# Patient Record
Sex: MALE | Race: WHITE | NOT HISPANIC OR LATINO | Employment: OTHER | URBAN - METROPOLITAN AREA
[De-identification: names, ages, dates, MRNs, and addresses within clinical notes are randomized per-mention and may not be internally consistent; named-entity substitution may affect disease eponyms.]

---

## 2017-11-29 ENCOUNTER — OFFICE VISIT (OUTPATIENT)
Dept: FAMILY MEDICINE CLINIC | Facility: CLINIC | Age: 72
End: 2017-11-29

## 2017-11-29 ENCOUNTER — HOSPITAL ENCOUNTER (OUTPATIENT)
Dept: GENERAL RADIOLOGY | Facility: HOSPITAL | Age: 72
Discharge: HOME | End: 2017-11-29
Attending: FAMILY MEDICINE | Admitting: FAMILY MEDICINE

## 2017-11-29 VITALS
OXYGEN SATURATION: 99 % | BODY MASS INDEX: 26.99 KG/M2 | WEIGHT: 178.1 LBS | SYSTOLIC BLOOD PRESSURE: 144 MMHG | DIASTOLIC BLOOD PRESSURE: 90 MMHG | HEIGHT: 68 IN | TEMPERATURE: 97.6 F | HEART RATE: 57 BPM

## 2017-11-29 DIAGNOSIS — M54.31 SCIATICA OF RIGHT SIDE: Primary | ICD-10-CM

## 2017-11-29 DIAGNOSIS — R09.89 PHLEGM IN THROAT: ICD-10-CM

## 2017-11-29 DIAGNOSIS — R12 HEARTBURN: ICD-10-CM

## 2017-11-29 DIAGNOSIS — M54.31 SCIATICA OF RIGHT SIDE: ICD-10-CM

## 2017-11-29 PROCEDURE — 99203 OFFICE O/P NEW LOW 30 MIN: CPT | Performed by: FAMILY MEDICINE

## 2017-11-29 PROCEDURE — 72110 X-RAY EXAM L-2 SPINE 4/>VWS: CPT

## 2017-11-29 RX ORDER — LANSOPRAZOLE 15 MG/1
15 CAPSULE, DELAYED RELEASE ORAL DAILY
COMMUNITY

## 2017-11-29 NOTE — PROGRESS NOTES
"  Subjective   Jake Kan is a 72 y.o. male who is here for   Chief Complaint   Patient presents with   • New Patient   • Leg Pain     Right leg \"burns\" x 4-5 months    .     History of Present Illness   New patient here.  Lives in [facility].  \" I don't trust those [place] doctors.\"  A   Has a new girlfriend Kary, who is an old patient of mine, although 3 yrs ago, (lost to follow up).    Right posterior leg pain off and on for months  No injury  No LBP  No hip pain  Does not sound like claudication.    Takes prn prevacid for GERD  Has a raspy voice  Suggested take prevacid every night for 2-3 months to see if he has GERD laryngitis  And add on nighty Claritin for phlegm in throat.        The following portions of the patient's history were reviewed and updated as appropriate: allergies, current medications, past family history, past medical history, past social history, past surgical history and problem list.    Review of Systems    Objective   Physical Exam   Constitutional: He appears well-developed and well-nourished. No distress.   HENT:   Mouth/Throat: Oropharynx is clear and moist.   Cardiovascular: Normal rate.    Pulmonary/Chest: Effort normal.   Musculoskeletal:        Right hip: He exhibits normal range of motion, normal strength, no tenderness, no bony tenderness and no swelling.        Right knee: Normal.        Thoracic back: Normal.        Lumbar back: He exhibits normal range of motion, no tenderness, no bony tenderness, no swelling, no edema, no spasm and normal pulse.        Legs:  Skin: He is not diaphoretic.   Nursing note and vitals reviewed.      Assessment/Plan   Jake was seen today for new patient and leg pain.    Diagnoses and all orders for this visit:    Sciatica of right side  -     XR Spine Lumbar 4+ View; Future    Phlegm in throat    Heartburn      There are no Patient Instructions on file for this visit.    There are no discontinued medications.     Return if " symptoms worsen or fail to improve.    Dr. Sree Thomas  [place], [place]

## 2019-08-07 ENCOUNTER — OFFICE VISIT (OUTPATIENT)
Dept: FAMILY MEDICINE CLINIC | Facility: CLINIC | Age: 74
End: 2019-08-07

## 2019-08-07 VITALS
OXYGEN SATURATION: 98 % | HEART RATE: 59 BPM | DIASTOLIC BLOOD PRESSURE: 88 MMHG | BODY MASS INDEX: 26.43 KG/M2 | WEIGHT: 174.4 LBS | HEIGHT: 68 IN | SYSTOLIC BLOOD PRESSURE: 156 MMHG

## 2019-08-07 DIAGNOSIS — Z12.5 ENCOUNTER FOR SCREENING FOR MALIGNANT NEOPLASM OF PROSTATE: ICD-10-CM

## 2019-08-07 DIAGNOSIS — K42.9 UMBILICAL HERNIA WITHOUT OBSTRUCTION AND WITHOUT GANGRENE: ICD-10-CM

## 2019-08-07 DIAGNOSIS — R53.82 CHRONIC FATIGUE: Primary | ICD-10-CM

## 2019-08-07 PROCEDURE — 99214 OFFICE O/P EST MOD 30 MIN: CPT | Performed by: FAMILY MEDICINE

## 2019-08-07 NOTE — PROGRESS NOTES
Subjective   Jake Kan is a 74 y.o. male who is here for   Chief Complaint   Patient presents with   • Fatigue     2 months    .     History of Present Illness   Fatigue: Patient complains of fatigue. Symptoms began several months ago. Sentinal symptom the patient feels fatigue began with: exercise intolerance. Symptoms of his fatigue have been fatigue with paradoxical insomnia, general malaise and poor athletic performance. Patient describes the following psychologic symptoms: none.  Patient denies fever, significant change in weight, symptoms of arthritis, unusual rashes, GI blood loss and witnessed or suspected sleep apnea. Symptoms have gradually worsened. Severity has been symptoms bothersome, but easily able to carry out all usual work/school/family activities. Previous visits for this problem: none.       The following portions of the patient's history were reviewed and updated as appropriate: allergies, current medications, past family history, past medical history, past social history, past surgical history and problem list.    Review of Systems   Constitutional: Positive for activity change and fatigue. Negative for appetite change, chills, diaphoresis, fever and unexpected weight change.   HENT: Negative for ear pain, facial swelling, nosebleeds, sinus pressure and tinnitus.    Eyes: Negative for visual disturbance.   Respiratory: Negative for cough, chest tightness, shortness of breath and wheezing.    Cardiovascular: Negative for chest pain, palpitations and leg swelling.   Gastrointestinal: Positive for abdominal pain. Negative for blood in stool, constipation, diarrhea, nausea, rectal pain and vomiting.   Endocrine: Negative for polydipsia, polyphagia and polyuria.   Genitourinary: Negative for dysuria, flank pain and hematuria.   Musculoskeletal: Negative for neck stiffness.   Skin: Negative for rash.   Neurological: Negative for dizziness, facial asymmetry, light-headedness and headaches.        Objective   Physical Exam   Constitutional: He appears well-developed and well-nourished. No distress.   HENT:   Mouth/Throat: Oropharynx is clear and moist.   Eyes: Conjunctivae are normal.   Neck: Neck supple.   Cardiovascular: Normal rate and regular rhythm.   Pulmonary/Chest: Effort normal and breath sounds normal.   Abdominal: Soft. Bowel sounds are normal.       Musculoskeletal: Normal range of motion.   Neurological: He is alert.   Skin: No rash noted.   Psychiatric: He has a normal mood and affect.   Nursing note and vitals reviewed.      Assessment/Plan   Jake was seen today for fatigue.    Diagnoses and all orders for this visit:    Chronic fatigue  -     CBC & Differential  -     Comprehensive Metabolic Panel  -     TSH  -     PSA Screen    Encounter for screening for malignant neoplasm of prostate   -     PSA Screen    Umbilical hernia without obstruction and without gangrene  -     CT Abdomen Pelvis With Contrast; Future      There are no Patient Instructions on file for this visit.    There are no discontinued medications.     Return if symptoms worsen or fail to improve.    Dr. Sree Thomas  [place], [place]

## 2019-08-08 LAB
ALBUMIN SERPL-MCNC: 4.1 G/DL (ref 3.5–5.2)
ALBUMIN/GLOB SERPL: 1.4 G/DL
ALP SERPL-CCNC: 76 U/L (ref 39–117)
ALT SERPL-CCNC: 16 U/L (ref 1–41)
AST SERPL-CCNC: 15 U/L (ref 1–40)
BASOPHILS # BLD AUTO: 0.02 10*3/MM3 (ref 0–0.2)
BASOPHILS NFR BLD AUTO: 0.4 % (ref 0–1.5)
BILIRUB SERPL-MCNC: 0.5 MG/DL (ref 0.2–1.2)
BUN SERPL-MCNC: 18 MG/DL (ref 8–23)
BUN/CREAT SERPL: 14.9 (ref 7–25)
CALCIUM SERPL-MCNC: 9.1 MG/DL (ref 8.6–10.5)
CHLORIDE SERPL-SCNC: 105 MMOL/L (ref 98–107)
CO2 SERPL-SCNC: 27.1 MMOL/L (ref 22–29)
CREAT SERPL-MCNC: 1.21 MG/DL (ref 0.76–1.27)
EOSINOPHIL # BLD AUTO: 0.16 10*3/MM3 (ref 0–0.4)
EOSINOPHIL NFR BLD AUTO: 3.1 % (ref 0.3–6.2)
ERYTHROCYTE [DISTWIDTH] IN BLOOD BY AUTOMATED COUNT: 12.6 % (ref 12.3–15.4)
GLOBULIN SER CALC-MCNC: 2.9 GM/DL
GLUCOSE SERPL-MCNC: 82 MG/DL (ref 65–99)
HCT VFR BLD AUTO: 42.6 % (ref 37.5–51)
HGB BLD-MCNC: 13.8 G/DL (ref 13–17.7)
IMM GRANULOCYTES # BLD AUTO: 0.01 10*3/MM3 (ref 0–0.05)
IMM GRANULOCYTES NFR BLD AUTO: 0.2 % (ref 0–0.5)
LYMPHOCYTES # BLD AUTO: 1.49 10*3/MM3 (ref 0.7–3.1)
LYMPHOCYTES NFR BLD AUTO: 28.6 % (ref 19.6–45.3)
MCH RBC QN AUTO: 31.5 PG (ref 26.6–33)
MCHC RBC AUTO-ENTMCNC: 32.4 G/DL (ref 31.5–35.7)
MCV RBC AUTO: 97.3 FL (ref 79–97)
MONOCYTES # BLD AUTO: 0.45 10*3/MM3 (ref 0.1–0.9)
MONOCYTES NFR BLD AUTO: 8.6 % (ref 5–12)
NEUTROPHILS # BLD AUTO: 3.08 10*3/MM3 (ref 1.7–7)
NEUTROPHILS NFR BLD AUTO: 59.1 % (ref 42.7–76)
NRBC BLD AUTO-RTO: 0 /100 WBC (ref 0–0.2)
PLATELET # BLD AUTO: 225 10*3/MM3 (ref 140–450)
POTASSIUM SERPL-SCNC: 3.9 MMOL/L (ref 3.5–5.2)
PROT SERPL-MCNC: 7 G/DL (ref 6–8.5)
PSA SERPL-MCNC: 1.02 NG/ML (ref 0–4)
RBC # BLD AUTO: 4.38 10*6/MM3 (ref 4.14–5.8)
SODIUM SERPL-SCNC: 144 MMOL/L (ref 136–145)
TSH SERPL DL<=0.005 MIU/L-ACNC: 2.07 MIU/ML (ref 0.27–4.2)
WBC # BLD AUTO: 5.21 10*3/MM3 (ref 3.4–10.8)

## 2019-09-03 ENCOUNTER — APPOINTMENT (OUTPATIENT)
Dept: CT IMAGING | Facility: HOSPITAL | Age: 74
End: 2019-09-03

## 2019-09-11 ENCOUNTER — HOSPITAL ENCOUNTER (OUTPATIENT)
Dept: CT IMAGING | Facility: HOSPITAL | Age: 74
Discharge: HOME | End: 2019-09-11
Admitting: FAMILY MEDICINE

## 2019-09-11 DIAGNOSIS — K42.9 UMBILICAL HERNIA WITHOUT OBSTRUCTION AND WITHOUT GANGRENE: ICD-10-CM

## 2019-09-11 PROCEDURE — 74177 CT ABD & PELVIS W/CONTRAST: CPT

## 2019-09-11 PROCEDURE — 0 IOPAMIDOL PER 1 ML: Performed by: FAMILY MEDICINE

## 2019-09-11 RX ADMIN — IOPAMIDOL 100 ML: 755 INJECTION, SOLUTION INTRAVENOUS at 11:37

## 2019-09-12 DIAGNOSIS — L02.216 ABSCESS, UMBILICAL: Primary | ICD-10-CM

## 2019-09-19 ENCOUNTER — APPOINTMENT (OUTPATIENT)
Dept: PREADMISSION TESTING | Facility: HOSPITAL | Age: 74
End: 2019-09-19

## 2019-09-19 ENCOUNTER — OFFICE VISIT (OUTPATIENT)
Dept: SURGERY | Facility: CLINIC | Age: 74
End: 2019-09-19

## 2019-09-19 VITALS
WEIGHT: 172.6 LBS | HEART RATE: 61 BPM | SYSTOLIC BLOOD PRESSURE: 132 MMHG | BODY MASS INDEX: 27.09 KG/M2 | OXYGEN SATURATION: 98 % | DIASTOLIC BLOOD PRESSURE: 75 MMHG | HEIGHT: 67 IN

## 2019-09-19 VITALS
BODY MASS INDEX: 27.06 KG/M2 | SYSTOLIC BLOOD PRESSURE: 129 MMHG | HEIGHT: 67 IN | WEIGHT: 172.4 LBS | TEMPERATURE: 97.2 F | OXYGEN SATURATION: 98 % | HEART RATE: 56 BPM | DIASTOLIC BLOOD PRESSURE: 73 MMHG

## 2019-09-19 DIAGNOSIS — L02.216 ABSCESS, UMBILICAL: Primary | ICD-10-CM

## 2019-09-19 LAB
ANION GAP SERPL CALCULATED.3IONS-SCNC: 11.2 MMOL/L (ref 5–15)
BUN BLD-MCNC: 17 MG/DL (ref 8–20)
BUN/CREAT SERPL: 14.2 (ref 6.2–20.3)
CALCIUM SPEC-SCNC: 8.5 MG/DL (ref 8.9–10.3)
CHLORIDE SERPL-SCNC: 104 MMOL/L (ref 101–111)
CO2 SERPL-SCNC: 27 MMOL/L (ref 22–32)
CREAT BLD-MCNC: 1.2 MG/DL (ref 0.7–1.2)
DEPRECATED RDW RBC AUTO: 43.3 FL (ref 37–54)
ERYTHROCYTE [DISTWIDTH] IN BLOOD BY AUTOMATED COUNT: 13.1 % (ref 12.3–15.4)
GFR SERPL CREATININE-BSD FRML MDRD: 59 ML/MIN/1.73
GLUCOSE BLD-MCNC: 84 MG/DL (ref 65–99)
HCT VFR BLD AUTO: 40.1 % (ref 37.5–51)
HGB BLD-MCNC: 13.7 G/DL (ref 13–17.7)
MCH RBC QN AUTO: 32.4 PG (ref 26.6–33)
MCHC RBC AUTO-ENTMCNC: 34.1 G/DL (ref 31.5–35.7)
MCV RBC AUTO: 95 FL (ref 79–97)
PLATELET # BLD AUTO: 242 10*3/MM3 (ref 140–450)
PMV BLD AUTO: 8.1 FL (ref 6–12)
POTASSIUM BLD-SCNC: 4.2 MMOL/L (ref 3.6–5.1)
RBC # BLD AUTO: 4.22 10*6/MM3 (ref 4.14–5.8)
SODIUM BLD-SCNC: 138 MMOL/L (ref 136–144)
WBC NRBC COR # BLD: 6 10*3/MM3 (ref 3.4–10.8)

## 2019-09-19 PROCEDURE — 80048 BASIC METABOLIC PNL TOTAL CA: CPT | Performed by: SURGERY

## 2019-09-19 PROCEDURE — 85027 COMPLETE CBC AUTOMATED: CPT | Performed by: SURGERY

## 2019-09-19 PROCEDURE — 99203 OFFICE O/P NEW LOW 30 MIN: CPT | Performed by: SURGERY

## 2019-09-19 PROCEDURE — 93005 ELECTROCARDIOGRAM TRACING: CPT

## 2019-09-19 PROCEDURE — 93010 ELECTROCARDIOGRAM REPORT: CPT | Performed by: INTERNAL MEDICINE

## 2019-09-19 PROCEDURE — 36415 COLL VENOUS BLD VENIPUNCTURE: CPT

## 2019-09-19 RX ORDER — SODIUM CHLORIDE 9 MG/ML
100 INJECTION, SOLUTION INTRAVENOUS CONTINUOUS
Status: CANCELLED | OUTPATIENT
Start: 2019-09-19

## 2019-09-19 NOTE — H&P (VIEW-ONLY)
"Subjective   Jake Kan is a 74 y.o. male.   Chief Complaint   Patient presents with   • Abscess     umbilical     /73   Pulse 56   Temp 97.2 °F (36.2 °C) (Oral)   Ht 170.2 cm (67\")   Wt 78.2 kg (172 lb 6.4 oz)   SpO2 98%   BMI 27.00 kg/m²     HISTORY OF PRESENT ILLNESS:  74-year-old man has been referred to me from evaluation of a chronic draining umbilicus.  He says that over the last 2 months he has had near daily bloody drainage from his bellybutton.  He denies any significant pain or swelling but he does say that there has been some surrounding skin sensitivity.  As part of his work-up he had a CT scan which showed a 2 x 1.5 x 1.5 cm extraperitoneal fluid collection deep to the umbilicus.  This was read as an umbilical abscess.  He denies any fevers or chills or ever having had surgery in this area before.      Outpatient Encounter Medications as of 9/19/2019   Medication Sig Dispense Refill   • lansoprazole (PREVACID) 15 MG capsule Take 15 mg by mouth Daily.       No facility-administered encounter medications on file as of 9/19/2019.          The following portions of the patient's history were reviewed and updated as appropriate: allergies, current medications, past family history, past medical history, past social history, past surgical history and problem list.    Review of Systems  Complete review of systems is been obtained is negative except as stated in HPI  Objective   Physical Exam   Constitutional: He appears well-developed and well-nourished.   HENT:   Head: Atraumatic.   Poor dentition   Eyes: Conjunctivae are normal. No scleral icterus.   Cardiovascular: Normal rate.   Pulmonary/Chest: Effort normal.   Abdominal: Soft. He exhibits no distension. There is no tenderness.   In the umbilicus there is clearly some foreign material could be hair and dead skin cells that despite aggressive pulling I was not able to free.  He is mildly tender in this location and there is some minor " bleeding during the process of trying to evacuate this foreign material   Musculoskeletal: He exhibits no edema or deformity.   Neurological: He is alert. No cranial nerve deficit.   Skin: Skin is warm and dry.   Psychiatric: He has a normal mood and affect. His behavior is normal.         Assessment/Plan   Jake was seen today for abscess.    Diagnoses and all orders for this visit:    Abscess, umbilical  -     Case Request; Standing  -     sodium chloride 0.9 % infusion  -     ceFAZolin (ANCEF) 2 g in sodium chloride 0.9 % 100 mL IVPB  -     Case Request    Other orders  -     Follow Anesthesia Guidelines / Standing Orders; Future  -     Provide NPO Instructions to Patient; Future  -     Follow Anesthesia Guidelines / Standing Orders; Standing  -     Verify NPO Status; Standing  -     Obtain Informed Consent; Standing  -     SCD (Sequential Compression Device) - Place on Patient in Pre-Op; Standing  -     Clip Operative Site; Standing  -     Have Patient Void Prior to Procedure; Standing  -     Verify/Perform Chlorhexidine Skin Prep; Standing  -     Instructions on Coughing, Deep Breathing & Incentive Spirometry; Standing  -     Oxygen Therapy- Nasal Cannula; Titrate for SPO2: 90% - 95%; Standing  -     Notify Physician - Standard; Standing    Since he does have some impacted foreign material in the base of his umbilicus with some minor bleeding associated with an approximately 2 cm fluid collection just deep to the umbilicus I have recommended umbilical exploration drainage of the abscess and removal of the foreign body.  I have counseled him about the risks of this operation including developing a hernia if I have to incised the fascia skin infection recurrence and most importantly that is part of this operation I may have to excise the umbilical stalk completely.  After this discussion he did not seem to be too concerned about that is reluctant to have surgery but understands that living with a bleeding  colton is quite a nuisance.  We will go ahead and schedule him for his excisional debridement and drainage of the underlying abscess if that is with this turns out to be.    Luan Dowell MD  9/19/2019  3:30 PM

## 2019-09-20 ENCOUNTER — ANESTHESIA EVENT (OUTPATIENT)
Dept: PERIOP | Facility: HOSPITAL | Age: 74
End: 2019-09-20

## 2019-09-20 ENCOUNTER — APPOINTMENT (OUTPATIENT)
Dept: PREADMISSION TESTING | Facility: HOSPITAL | Age: 74
End: 2019-09-20

## 2019-09-23 ENCOUNTER — ANESTHESIA (OUTPATIENT)
Dept: PERIOP | Facility: HOSPITAL | Age: 74
End: 2019-09-23

## 2019-09-23 ENCOUNTER — HOSPITAL ENCOUNTER (OUTPATIENT)
Facility: HOSPITAL | Age: 74
Setting detail: HOSPITAL OUTPATIENT SURGERY
Discharge: HOME | End: 2019-09-23
Attending: SURGERY | Admitting: SURGERY

## 2019-09-23 VITALS
HEART RATE: 55 BPM | RESPIRATION RATE: 14 BRPM | SYSTOLIC BLOOD PRESSURE: 122 MMHG | DIASTOLIC BLOOD PRESSURE: 51 MMHG | OXYGEN SATURATION: 97 % | TEMPERATURE: 97.1 F

## 2019-09-23 DIAGNOSIS — L02.216 ABSCESS, UMBILICAL: ICD-10-CM

## 2019-09-23 PROCEDURE — 49585 PR REPAIR UMBILICAL HERN,5+Y/O,REDUC: CPT | Performed by: NURSE PRACTITIONER

## 2019-09-23 PROCEDURE — 25010000002 HYDRALAZINE PER 20 MG: Performed by: ANESTHESIOLOGIST ASSISTANT

## 2019-09-23 PROCEDURE — 25010000002 HYDROMORPHONE PER 4 MG: Performed by: ANESTHESIOLOGIST ASSISTANT

## 2019-09-23 PROCEDURE — 25010000002 ONDANSETRON PER 1 MG: Performed by: ANESTHESIOLOGIST ASSISTANT

## 2019-09-23 PROCEDURE — 88304 TISSUE EXAM BY PATHOLOGIST: CPT | Performed by: SURGERY

## 2019-09-23 PROCEDURE — 25010000002 DEXAMETHASONE PER 1 MG: Performed by: ANESTHESIOLOGIST ASSISTANT

## 2019-09-23 PROCEDURE — 49585 PR REPAIR UMBILICAL HERN,5+Y/O,REDUC: CPT | Performed by: SURGERY

## 2019-09-23 PROCEDURE — 25010000002 FENTANYL CITRATE (PF) 100 MCG/2ML SOLUTION: Performed by: ANESTHESIOLOGIST ASSISTANT

## 2019-09-23 PROCEDURE — 25010000002 PROPOFOL 10 MG/ML EMULSION: Performed by: ANESTHESIOLOGIST ASSISTANT

## 2019-09-23 RX ORDER — SODIUM CHLORIDE 9 MG/ML
9 INJECTION, SOLUTION INTRAVENOUS CONTINUOUS PRN
Status: DISCONTINUED | OUTPATIENT
Start: 2019-09-23 | End: 2019-09-23 | Stop reason: HOSPADM

## 2019-09-23 RX ORDER — LIDOCAINE HYDROCHLORIDE 20 MG/ML
INJECTION, SOLUTION EPIDURAL; INFILTRATION; INTRACAUDAL; PERINEURAL AS NEEDED
Status: DISCONTINUED | OUTPATIENT
Start: 2019-09-23 | End: 2019-09-23 | Stop reason: SURG

## 2019-09-23 RX ORDER — MEPERIDINE HYDROCHLORIDE 25 MG/ML
12.5 INJECTION INTRAMUSCULAR; INTRAVENOUS; SUBCUTANEOUS
Status: DISCONTINUED | OUTPATIENT
Start: 2019-09-23 | End: 2019-09-23 | Stop reason: HOSPADM

## 2019-09-23 RX ORDER — SODIUM CHLORIDE 0.9 % (FLUSH) 0.9 %
3 SYRINGE (ML) INJECTION EVERY 12 HOURS SCHEDULED
Status: DISCONTINUED | OUTPATIENT
Start: 2019-09-23 | End: 2019-09-23 | Stop reason: HOSPADM

## 2019-09-23 RX ORDER — NEOSTIGMINE METHYLSULFATE 5 MG/5 ML
SYRINGE (ML) INTRAVENOUS AS NEEDED
Status: DISCONTINUED | OUTPATIENT
Start: 2019-09-23 | End: 2019-09-23 | Stop reason: SURG

## 2019-09-23 RX ORDER — SODIUM CHLORIDE 9 MG/ML
100 INJECTION, SOLUTION INTRAVENOUS CONTINUOUS
Status: DISCONTINUED | OUTPATIENT
Start: 2019-09-23 | End: 2019-09-23 | Stop reason: HOSPADM

## 2019-09-23 RX ORDER — IPRATROPIUM BROMIDE AND ALBUTEROL SULFATE 2.5; .5 MG/3ML; MG/3ML
3 SOLUTION RESPIRATORY (INHALATION) ONCE AS NEEDED
Status: DISCONTINUED | OUTPATIENT
Start: 2019-09-23 | End: 2019-09-23 | Stop reason: HOSPADM

## 2019-09-23 RX ORDER — GLYCOPYRROLATE 1 MG/5 ML
SYRINGE (ML) INTRAVENOUS AS NEEDED
Status: DISCONTINUED | OUTPATIENT
Start: 2019-09-23 | End: 2019-09-23 | Stop reason: SURG

## 2019-09-23 RX ORDER — DIPHENHYDRAMINE HYDROCHLORIDE 50 MG/ML
12.5 INJECTION INTRAMUSCULAR; INTRAVENOUS
Status: DISCONTINUED | OUTPATIENT
Start: 2019-09-23 | End: 2019-09-23 | Stop reason: HOSPADM

## 2019-09-23 RX ORDER — ROCURONIUM BROMIDE 10 MG/ML
INJECTION, SOLUTION INTRAVENOUS AS NEEDED
Status: DISCONTINUED | OUTPATIENT
Start: 2019-09-23 | End: 2019-09-23 | Stop reason: SURG

## 2019-09-23 RX ORDER — EPHEDRINE SULFATE 50 MG/ML
5 INJECTION, SOLUTION INTRAVENOUS ONCE AS NEEDED
Status: DISCONTINUED | OUTPATIENT
Start: 2019-09-23 | End: 2019-09-23 | Stop reason: HOSPADM

## 2019-09-23 RX ORDER — PROMETHAZINE HYDROCHLORIDE 25 MG/1
25 TABLET ORAL ONCE AS NEEDED
Status: DISCONTINUED | OUTPATIENT
Start: 2019-09-23 | End: 2019-09-23 | Stop reason: HOSPADM

## 2019-09-23 RX ORDER — PROMETHAZINE HYDROCHLORIDE 25 MG/ML
6.25 INJECTION, SOLUTION INTRAMUSCULAR; INTRAVENOUS ONCE AS NEEDED
Status: DISCONTINUED | OUTPATIENT
Start: 2019-09-23 | End: 2019-09-23 | Stop reason: HOSPADM

## 2019-09-23 RX ORDER — HYDROCODONE BITARTRATE AND ACETAMINOPHEN 5; 325 MG/1; MG/1
1 TABLET ORAL EVERY 4 HOURS PRN
Qty: 15 TABLET | Refills: 0 | Status: SHIPPED | OUTPATIENT
Start: 2019-09-23 | End: 2019-10-08

## 2019-09-23 RX ORDER — FENTANYL CITRATE 50 UG/ML
INJECTION, SOLUTION INTRAMUSCULAR; INTRAVENOUS AS NEEDED
Status: DISCONTINUED | OUTPATIENT
Start: 2019-09-23 | End: 2019-09-23 | Stop reason: SURG

## 2019-09-23 RX ORDER — LABETALOL HYDROCHLORIDE 5 MG/ML
5 INJECTION, SOLUTION INTRAVENOUS
Status: DISCONTINUED | OUTPATIENT
Start: 2019-09-23 | End: 2019-09-23 | Stop reason: HOSPADM

## 2019-09-23 RX ORDER — HYDRALAZINE HYDROCHLORIDE 20 MG/ML
5 INJECTION INTRAMUSCULAR; INTRAVENOUS
Status: DISCONTINUED | OUTPATIENT
Start: 2019-09-23 | End: 2019-09-23 | Stop reason: HOSPADM

## 2019-09-23 RX ORDER — SODIUM CHLORIDE 0.9 % (FLUSH) 0.9 %
3-10 SYRINGE (ML) INJECTION AS NEEDED
Status: DISCONTINUED | OUTPATIENT
Start: 2019-09-23 | End: 2019-09-23 | Stop reason: HOSPADM

## 2019-09-23 RX ORDER — DEXAMETHASONE SODIUM PHOSPHATE 4 MG/ML
INJECTION, SOLUTION INTRA-ARTICULAR; INTRALESIONAL; INTRAMUSCULAR; INTRAVENOUS; SOFT TISSUE AS NEEDED
Status: DISCONTINUED | OUTPATIENT
Start: 2019-09-23 | End: 2019-09-23 | Stop reason: SURG

## 2019-09-23 RX ORDER — HYDROMORPHONE HCL 110MG/55ML
0.5 PATIENT CONTROLLED ANALGESIA SYRINGE INTRAVENOUS
Status: DISCONTINUED | OUTPATIENT
Start: 2019-09-23 | End: 2019-09-23 | Stop reason: HOSPADM

## 2019-09-23 RX ORDER — PROMETHAZINE HYDROCHLORIDE 25 MG/1
25 SUPPOSITORY RECTAL ONCE AS NEEDED
Status: DISCONTINUED | OUTPATIENT
Start: 2019-09-23 | End: 2019-09-23 | Stop reason: HOSPADM

## 2019-09-23 RX ORDER — ONDANSETRON 2 MG/ML
INJECTION INTRAMUSCULAR; INTRAVENOUS AS NEEDED
Status: DISCONTINUED | OUTPATIENT
Start: 2019-09-23 | End: 2019-09-23 | Stop reason: SURG

## 2019-09-23 RX ORDER — SODIUM CHLORIDE, SODIUM LACTATE, POTASSIUM CHLORIDE, CALCIUM CHLORIDE 600; 310; 30; 20 MG/100ML; MG/100ML; MG/100ML; MG/100ML
9 INJECTION, SOLUTION INTRAVENOUS CONTINUOUS PRN
Status: DISCONTINUED | OUTPATIENT
Start: 2019-09-23 | End: 2019-09-23 | Stop reason: HOSPADM

## 2019-09-23 RX ORDER — ONDANSETRON 2 MG/ML
4 INJECTION INTRAMUSCULAR; INTRAVENOUS ONCE AS NEEDED
Status: DISCONTINUED | OUTPATIENT
Start: 2019-09-23 | End: 2019-09-23 | Stop reason: HOSPADM

## 2019-09-23 RX ORDER — PROPOFOL 10 MG/ML
VIAL (ML) INTRAVENOUS AS NEEDED
Status: DISCONTINUED | OUTPATIENT
Start: 2019-09-23 | End: 2019-09-23 | Stop reason: SURG

## 2019-09-23 RX ORDER — EPHEDRINE SULFATE/0.9% NACL/PF 25 MG/5 ML
SYRINGE (ML) INTRAVENOUS AS NEEDED
Status: DISCONTINUED | OUTPATIENT
Start: 2019-09-23 | End: 2019-09-23 | Stop reason: SURG

## 2019-09-23 RX ADMIN — SODIUM CHLORIDE, SODIUM LACTATE, POTASSIUM CHLORIDE, AND CALCIUM CHLORIDE 9 ML/HR: 600; 310; 30; 20 INJECTION, SOLUTION INTRAVENOUS at 11:38

## 2019-09-23 RX ADMIN — HYDROMORPHONE HYDROCHLORIDE 0.5 MG: 2 INJECTION, SOLUTION INTRAMUSCULAR; INTRAVENOUS; SUBCUTANEOUS at 13:02

## 2019-09-23 RX ADMIN — PROPOFOL 200 MG: 10 INJECTION, EMULSION INTRAVENOUS at 11:55

## 2019-09-23 RX ADMIN — ROCURONIUM BROMIDE 40 MG: 10 INJECTION, SOLUTION INTRAVENOUS at 11:57

## 2019-09-23 RX ADMIN — Medication 1 MG: at 12:33

## 2019-09-23 RX ADMIN — Medication 5 MG: at 12:33

## 2019-09-23 RX ADMIN — CEFAZOLIN SODIUM 2 G: 1 INJECTION, POWDER, FOR SOLUTION INTRAMUSCULAR; INTRAVENOUS at 12:03

## 2019-09-23 RX ADMIN — Medication 10 MG: at 12:05

## 2019-09-23 RX ADMIN — MEPERIDINE HYDROCHLORIDE 12.5 MG: 25 INJECTION INTRAMUSCULAR; INTRAVENOUS; SUBCUTANEOUS at 13:52

## 2019-09-23 RX ADMIN — ONDANSETRON 4 MG: 2 INJECTION INTRAMUSCULAR; INTRAVENOUS at 12:02

## 2019-09-23 RX ADMIN — FENTANYL CITRATE 50 MCG: 50 INJECTION, SOLUTION INTRAMUSCULAR; INTRAVENOUS at 11:55

## 2019-09-23 RX ADMIN — FENTANYL CITRATE 50 MCG: 50 INJECTION, SOLUTION INTRAMUSCULAR; INTRAVENOUS at 12:16

## 2019-09-23 RX ADMIN — HYDROMORPHONE HYDROCHLORIDE 0.5 MG: 2 INJECTION, SOLUTION INTRAMUSCULAR; INTRAVENOUS; SUBCUTANEOUS at 13:28

## 2019-09-23 RX ADMIN — DEXAMETHASONE SODIUM PHOSPHATE 4 MG: 4 INJECTION, SOLUTION INTRAMUSCULAR; INTRAVENOUS at 12:02

## 2019-09-23 RX ADMIN — HYDRALAZINE HYDROCHLORIDE 5 MG: 20 INJECTION INTRAMUSCULAR; INTRAVENOUS at 13:17

## 2019-09-23 RX ADMIN — LIDOCAINE HYDROCHLORIDE 50 MG: 20 INJECTION, SOLUTION EPIDURAL; INFILTRATION; INTRACAUDAL; PERINEURAL at 11:55

## 2019-09-23 NOTE — ANESTHESIA PROCEDURE NOTES
Airway  Urgency: elective    Date/Time: 9/23/2019 11:58 AM  Difficult airway    General Information and Staff    Patient location during procedure: OR    Indications and Patient Condition  Indications for airway management: airway protection    Preoxygenated: yes  MILS maintained throughout  Mask difficulty assessment: 1 - vent by mask    Final Airway Details  Final airway type: endotracheal airway      Successful airway: ETT  Cuffed: yes   Successful intubation technique: direct laryngoscopy  Facilitating devices/methods: Bougie  Endotracheal tube insertion site: oral  Blade: Sil  Blade size: 4  ETT size (mm): 7.5  Cormack-Lehane Classification: grade III - view of epiglottis only  Placement verified by: chest auscultation and capnometry   Cuff volume (mL): 8  Measured from: lips  ETT/EBT  to lips (cm): 20  Number of attempts at approach: 2  Assessment: lips, teeth, and gum same as pre-op and atraumatic intubation

## 2019-09-23 NOTE — ANESTHESIA PREPROCEDURE EVALUATION
Anesthesia Evaluation     Patient summary reviewed   NPO Solid Status: > 8 hours  NPO Liquid Status: > 8 hours           Airway   Mallampati: II  TM distance: >3 FB  Neck ROM: full  No difficulty expected  Dental - normal exam     Pulmonary - normal exam   Cardiovascular - normal exam  Exercise tolerance: good (4-7 METS)    ECG reviewed    (+) hyperlipidemia,       Neuro/Psych  GI/Hepatic/Renal/Endo      Musculoskeletal     Abdominal  - normal exam    Bowel sounds: normal.   Substance History      OB/GYN          Other                        Anesthesia Plan    ASA 2     general     intravenous induction   Anesthetic plan, all risks, benefits, and alternatives have been provided, discussed and informed consent has been obtained with: patient.

## 2019-09-23 NOTE — ANESTHESIA POSTPROCEDURE EVALUATION
Patient: Jake Kan    Procedure Summary     Date:  09/23/19 Room / Location:  [facility] OR  / [facility] MAIN OR    Anesthesia Start:  1150 Anesthesia Stop:  1253    Procedure:  excision of umbilical hernia mesh and umbilical hernia repair (N/A Abdomen) Diagnosis:       Abscess, umbilical      (Abscess, umbilical [L02.216])    Surgeon:  Luan Dowell MD Provider:  Anjel Tesfaye MD    Anesthesia Type:  general ASA Status:  2          Anesthesia Type: general  Last vitals  BP   162/83 (09/23/19 1410)   Temp   97.3 °F (36.3 °C) (09/23/19 1410)   Pulse   56 (09/23/19 1410)   Resp   15 (09/23/19 1410)     SpO2   96 % (09/23/19 1410)     Post Anesthesia Care and Evaluation    Patient location during evaluation: PACU  Patient participation: complete - patient participated  Level of consciousness: awake  Pain scale: See nurse's notes for pain score.  Pain management: adequate  Airway patency: patent  Anesthetic complications: No anesthetic complications  PONV Status: none  Cardiovascular status: acceptable  Respiratory status: acceptable  Hydration status: acceptable    Comments: Patient seen and examined postoperatively; vital signs stable; SpO2 greater than or equal to 90%; cardiopulmonary status stable; nausea/vomiting adequately controlled; pain adequately controlled; no apparent anesthesia complications; patient discharged from anesthesia care when discharge criteria were met

## 2019-09-23 NOTE — OP NOTE
Operative Report:    Patient Name:  Jake Kan  YOB: 1945    Date of Surgery:  9/23/2019     Indications: 74-year-old man with a history of a bleeding draining sinus tract in his umbilicus.  CT scan demonstrated an approximately 2 cm fluid collection deep to the umbilical stalk concerning for an umbilical abscess.  On my exam in the office he had bleeding from the base of the umbilical stalk and dense fibrous material suggesting previously implanted umbilical hernia mesh.  I counseled about the risks and benefits of surgical intervention, he understood and was willing to proceed.    Pre-op Diagnosis:   Abscess, umbilical [L02.216]       Post-Op Diagnosis Codes:  Infected hernia mesh    Procedure/CPT® Codes:      Procedure(s):  Excision of infected hernia mesh  Primary umbilical hernia repair    Staff:  Surgeon(s):  Luan Dowell MD    Assistant: Damaris Desir APRN    Anesthesia: General    Estimated Blood Loss: minimal    Implants:    Nothing was implanted during the procedure    Specimen:          Specimens     ID Source Type Tests Collected By Collected At Frozen?      A Umbilicus Tissue · TISSUE PATHOLOGY EXAM   Luan Dowell MD 9/23/19 1227 No     Description: excision of umbilical mass              Findings: Chronically infected hernia mesh with erosion into umbilical stalk causing bleeding    Complications: None    Description of Procedure: Patient was taken to the operating room placed on the operating table in the supine position under general anesthesia.  His abdomen was prepped and draped normal sterile fashion.  Timeout was performed identifying the correct patient procedure site of operation.    I began the operation by making an elliptical incision in the transverse orientation around the umbilical stalk where the chronic bleeding and infection was present.  Dissection through subtenons tissues was performed with the Bovie down to the fascia.  This was performed circumferentially  and the fascia was then incised.  A dense fibrous two centimeter wide of material was well encapsulated I removed.  This was sent to pathology for view.  I then inspected the underlying contents of the abdomen which were the omentum and a loop of small bowel which were not injured during the removal process.  The underlying abdominal wall fascia were palpated without any evidence of mass or retained material.  I then closed the hernia defect using interrupted 0 Ethibond sutures in a transverse orientation.  The wound was then thoroughly irrigated with saline.  The deep subtenons tissues were closed with 3-0 Vicryl suture the dermis with 3-0 Vicryl suture and the skin with 4-0 Vicryl suture.  Mastisol and Steri-Strips were placed on the wound.      Luan Dowell MD     Date: 9/23/2019  Time: 12:44 PM

## 2019-09-25 LAB
LAB AP CASE REPORT: NORMAL
PATH REPORT.FINAL DX SPEC: NORMAL
PATH REPORT.GROSS SPEC: NORMAL

## 2019-10-08 ENCOUNTER — OFFICE VISIT (OUTPATIENT)
Dept: SURGERY | Facility: CLINIC | Age: 74
End: 2019-10-08

## 2019-10-08 VITALS
HEART RATE: 60 BPM | SYSTOLIC BLOOD PRESSURE: 131 MMHG | OXYGEN SATURATION: 100 % | HEIGHT: 67 IN | TEMPERATURE: 96.8 F | WEIGHT: 169.2 LBS | BODY MASS INDEX: 26.56 KG/M2 | DIASTOLIC BLOOD PRESSURE: 75 MMHG

## 2019-10-08 DIAGNOSIS — L02.216 ABSCESS, UMBILICAL: Primary | ICD-10-CM

## 2019-10-08 PROCEDURE — 99024 POSTOP FOLLOW-UP VISIT: CPT | Performed by: SURGERY

## 2019-10-08 NOTE — PROGRESS NOTES
"Subjective   Jake Kan is a 74 y.o. male.   74-year-old man who is 2 weeks out from a excision of a infected hernia mesh at the umbilicus.  He says he has had minimal pain since his operation been tolerating a diet having bowel function and is getting back to his activities of daily living.    Objective   /75   Pulse 60   Temp 96.8 °F (36 °C) (Oral)   Ht 170.2 cm (67.01\")   Wt 76.7 kg (169 lb 3.2 oz)   SpO2 100%   BMI 26.49 kg/m²   Physical Exam   Constitutional: He appears well-developed and well-nourished.   HENT:   Head: Normocephalic and atraumatic.   Pulmonary/Chest: Effort normal. No respiratory distress.   Abdominal: Soft.   His umbilical incision has healed well with some induration but no evidence of drainage or infection.  There is no evidence of recurrent hernia.   Neurological: He is alert. No cranial nerve deficit.   Skin: Skin is warm and dry.   Psychiatric: He has a normal mood and affect. His behavior is normal.       Assessment/Plan   Jake was seen today for post-op follow-up.    Diagnoses and all orders for this visit:    Abscess, umbilical    2 weeks out from explant of infected umbilical hernia mesh with primary repair.  He has a lifting restriction of no more than 15 pounds for an additional 2 weeks.  At that time he can go back to work full duty.  In the meantime continue with light duty work.  Can follow-up as needed    Luan Dowell MD  10/8/2019  9:45 AM    "

## 2021-08-21 ENCOUNTER — APPOINTMENT (OUTPATIENT)
Dept: GENERAL RADIOLOGY | Facility: HOSPITAL | Age: 76
End: 2021-08-21

## 2021-08-21 PROCEDURE — 71046 X-RAY EXAM CHEST 2 VIEWS: CPT | Performed by: FAMILY MEDICINE

## 2021-09-02 ENCOUNTER — HOSPITAL ENCOUNTER (INPATIENT)
Facility: HOSPITAL | Age: 76
LOS: 13 days | Discharge: NURSING, REHAB OR LONG-TERM CARE | End: 2021-09-15
Attending: EMERGENCY MEDICINE | Admitting: INTERNAL MEDICINE

## 2021-09-02 ENCOUNTER — APPOINTMENT (OUTPATIENT)
Dept: GENERAL RADIOLOGY | Facility: HOSPITAL | Age: 76
End: 2021-09-02

## 2021-09-02 DIAGNOSIS — N39.0 ACUTE UTI: ICD-10-CM

## 2021-09-02 DIAGNOSIS — R41.82 ALTERED MENTAL STATUS, UNSPECIFIED ALTERED MENTAL STATUS TYPE: ICD-10-CM

## 2021-09-02 DIAGNOSIS — J12.82 PNEUMONIA DUE TO COVID-19 VIRUS: Primary | ICD-10-CM

## 2021-09-02 DIAGNOSIS — N17.0 ACUTE KIDNEY INJURY (AKI) WITH ACUTE TUBULAR NECROSIS (ATN) (HCC): ICD-10-CM

## 2021-09-02 DIAGNOSIS — U07.1 PNEUMONIA DUE TO COVID-19 VIRUS: Primary | ICD-10-CM

## 2021-09-02 LAB
ALBUMIN SERPL-MCNC: 3.8 G/DL (ref 3.5–5.2)
ALBUMIN/GLOB SERPL: 1 G/DL
ALP SERPL-CCNC: 69 U/L (ref 39–117)
ALT SERPL W P-5'-P-CCNC: 21 U/L (ref 1–41)
AMORPH URATE CRY URNS QL MICRO: ABNORMAL /HPF
ANION GAP SERPL CALCULATED.3IONS-SCNC: 12.8 MMOL/L (ref 5–15)
ANISOCYTOSIS BLD QL: ABNORMAL
AST SERPL-CCNC: 22 U/L (ref 1–40)
BACTERIA UR QL AUTO: ABNORMAL /HPF
BILIRUB SERPL-MCNC: 2.3 MG/DL (ref 0–1.2)
BILIRUB UR QL STRIP: ABNORMAL
BUN SERPL-MCNC: 62 MG/DL (ref 8–23)
BUN/CREAT SERPL: 35 (ref 7–25)
BURR CELLS BLD QL SMEAR: ABNORMAL
CALCIUM SPEC-SCNC: 9.7 MG/DL (ref 8.6–10.5)
CHLORIDE SERPL-SCNC: 108 MMOL/L (ref 98–107)
CLARITY UR: ABNORMAL
CO2 SERPL-SCNC: 26.2 MMOL/L (ref 22–29)
COLOR UR: ABNORMAL
CREAT SERPL-MCNC: 1.77 MG/DL (ref 0.76–1.27)
D-LACTATE SERPL-SCNC: 2.2 MMOL/L (ref 0.5–2)
DEPRECATED RDW RBC AUTO: 45.8 FL (ref 37–54)
ERYTHROCYTE [DISTWIDTH] IN BLOOD BY AUTOMATED COUNT: 13.4 % (ref 12.3–15.4)
GFR SERPL CREATININE-BSD FRML MDRD: 37 ML/MIN/1.73
GLOBULIN UR ELPH-MCNC: 4 GM/DL
GLUCOSE SERPL-MCNC: 116 MG/DL (ref 65–99)
GLUCOSE UR STRIP-MCNC: NEGATIVE MG/DL
GRAN CASTS URNS QL MICRO: ABNORMAL /LPF
HCT VFR BLD AUTO: 40.6 % (ref 37.5–51)
HGB BLD-MCNC: 14 G/DL (ref 13–17.7)
HGB UR QL STRIP.AUTO: ABNORMAL
HYALINE CASTS UR QL AUTO: ABNORMAL /LPF
INR PPP: 1.32 (ref 0.9–1.1)
KETONES UR QL STRIP: ABNORMAL
LEUKOCYTE ESTERASE UR QL STRIP.AUTO: ABNORMAL
LIPASE SERPL-CCNC: 21 U/L (ref 13–60)
LYMPHOCYTES # BLD MANUAL: 0.22 10*3/MM3 (ref 0.7–3.1)
LYMPHOCYTES NFR BLD MANUAL: 3 % (ref 19.6–45.3)
LYMPHOCYTES NFR BLD MANUAL: 5.1 % (ref 5–12)
MCH RBC QN AUTO: 31.9 PG (ref 26.6–33)
MCHC RBC AUTO-ENTMCNC: 34.5 G/DL (ref 31.5–35.7)
MCV RBC AUTO: 92.5 FL (ref 79–97)
MONOCYTES # BLD AUTO: 0.37 10*3/MM3 (ref 0.1–0.9)
NEUTROPHILS # BLD AUTO: 6.73 10*3/MM3 (ref 1.7–7)
NEUTROPHILS NFR BLD MANUAL: 91.9 % (ref 42.7–76)
NITRITE UR QL STRIP: POSITIVE
PH UR STRIP.AUTO: <=5 [PH] (ref 5–8)
PLAT MORPH BLD: NORMAL
PLATELET # BLD AUTO: 343 10*3/MM3 (ref 140–450)
PMV BLD AUTO: 10.4 FL (ref 6–12)
POIKILOCYTOSIS BLD QL SMEAR: ABNORMAL
POTASSIUM SERPL-SCNC: 3.8 MMOL/L (ref 3.5–5.2)
PROCALCITONIN SERPL-MCNC: 0.22 NG/ML (ref 0–0.25)
PROT SERPL-MCNC: 7.8 G/DL (ref 6–8.5)
PROT UR QL STRIP: ABNORMAL
PROTHROMBIN TIME: 16.2 SECONDS (ref 11.7–14.2)
RBC # BLD AUTO: 4.39 10*6/MM3 (ref 4.14–5.8)
RBC # UR: ABNORMAL /HPF
REF LAB TEST METHOD: ABNORMAL
SODIUM SERPL-SCNC: 147 MMOL/L (ref 136–145)
SP GR UR STRIP: 1.02 (ref 1–1.03)
SQUAMOUS #/AREA URNS HPF: ABNORMAL /HPF
TRANS CELLS #/AREA URNS HPF: ABNORMAL /HPF
UROBILINOGEN UR QL STRIP: ABNORMAL
WBC # BLD AUTO: 7.32 10*3/MM3 (ref 3.4–10.8)
WBC MORPH BLD: NORMAL
WBC UR QL AUTO: ABNORMAL /HPF

## 2021-09-02 PROCEDURE — 86140 C-REACTIVE PROTEIN: CPT | Performed by: INTERNAL MEDICINE

## 2021-09-02 PROCEDURE — 81001 URINALYSIS AUTO W/SCOPE: CPT | Performed by: NURSE PRACTITIONER

## 2021-09-02 PROCEDURE — 85007 BL SMEAR W/DIFF WBC COUNT: CPT | Performed by: NURSE PRACTITIONER

## 2021-09-02 PROCEDURE — P9612 CATHETERIZE FOR URINE SPEC: HCPCS

## 2021-09-02 PROCEDURE — 87040 BLOOD CULTURE FOR BACTERIA: CPT | Performed by: NURSE PRACTITIONER

## 2021-09-02 PROCEDURE — 25010000002 DEXAMETHASONE PER 1 MG: Performed by: NURSE PRACTITIONER

## 2021-09-02 PROCEDURE — 84145 PROCALCITONIN (PCT): CPT | Performed by: NURSE PRACTITIONER

## 2021-09-02 PROCEDURE — G0378 HOSPITAL OBSERVATION PER HR: HCPCS

## 2021-09-02 PROCEDURE — 3E0333Z INTRODUCTION OF ANTI-INFLAMMATORY INTO PERIPHERAL VEIN, PERCUTANEOUS APPROACH: ICD-10-PCS | Performed by: HOSPITALIST

## 2021-09-02 PROCEDURE — 80053 COMPREHEN METABOLIC PANEL: CPT | Performed by: NURSE PRACTITIONER

## 2021-09-02 PROCEDURE — 71045 X-RAY EXAM CHEST 1 VIEW: CPT

## 2021-09-02 PROCEDURE — 85610 PROTHROMBIN TIME: CPT | Performed by: NURSE PRACTITIONER

## 2021-09-02 PROCEDURE — 83690 ASSAY OF LIPASE: CPT | Performed by: NURSE PRACTITIONER

## 2021-09-02 PROCEDURE — 83605 ASSAY OF LACTIC ACID: CPT | Performed by: EMERGENCY MEDICINE

## 2021-09-02 PROCEDURE — 85025 COMPLETE CBC W/AUTO DIFF WBC: CPT | Performed by: NURSE PRACTITIONER

## 2021-09-02 PROCEDURE — 25010000002 CEFTRIAXONE PER 250 MG: Performed by: NURSE PRACTITIONER

## 2021-09-02 PROCEDURE — 99285 EMERGENCY DEPT VISIT HI MDM: CPT

## 2021-09-02 RX ORDER — DEXAMETHASONE SODIUM PHOSPHATE 10 MG/ML
6 INJECTION INTRAMUSCULAR; INTRAVENOUS ONCE
Status: COMPLETED | OUTPATIENT
Start: 2021-09-02 | End: 2021-09-02

## 2021-09-02 RX ORDER — SODIUM CHLORIDE 0.9 % (FLUSH) 0.9 %
10 SYRINGE (ML) INJECTION AS NEEDED
Status: DISCONTINUED | OUTPATIENT
Start: 2021-09-02 | End: 2021-09-15 | Stop reason: HOSPADM

## 2021-09-02 RX ADMIN — SODIUM CHLORIDE, POTASSIUM CHLORIDE, SODIUM LACTATE AND CALCIUM CHLORIDE 500 ML: 600; 310; 30; 20 INJECTION, SOLUTION INTRAVENOUS at 21:04

## 2021-09-02 RX ADMIN — CEFTRIAXONE 1 G: 1 INJECTION, POWDER, FOR SOLUTION INTRAMUSCULAR; INTRAVENOUS at 22:04

## 2021-09-02 RX ADMIN — DEXAMETHASONE SODIUM PHOSPHATE 6 MG: 10 INJECTION INTRAMUSCULAR; INTRAVENOUS at 22:36

## 2021-09-02 NOTE — ED TRIAGE NOTES
Pt was brought in by ems from home. Pt police was called to house by neighbors because dog was running around. Pt and spouse were found at home and were confused. Pt was laying naked in bed. Ems states that food was laying everywhere but was moldy and dry.    Pt wearing mask on arrival. Staff wearing mask and goggles at time of triage.

## 2021-09-03 ENCOUNTER — APPOINTMENT (OUTPATIENT)
Dept: CARDIOLOGY | Facility: HOSPITAL | Age: 76
End: 2021-09-03

## 2021-09-03 PROBLEM — N17.9 AKI (ACUTE KIDNEY INJURY) (HCC): Status: ACTIVE | Noted: 2021-09-03

## 2021-09-03 PROBLEM — N18.31 STAGE 3A CHRONIC KIDNEY DISEASE: Status: ACTIVE | Noted: 2021-09-03

## 2021-09-03 PROBLEM — E86.0 DEHYDRATION: Status: ACTIVE | Noted: 2021-09-03

## 2021-09-03 PROBLEM — N30.00 ACUTE CYSTITIS: Status: ACTIVE | Noted: 2021-09-03

## 2021-09-03 PROBLEM — R79.89 POSITIVE D-DIMER: Status: ACTIVE | Noted: 2021-09-03

## 2021-09-03 PROBLEM — R09.02 HYPOXIA: Status: ACTIVE | Noted: 2021-09-03

## 2021-09-03 PROBLEM — E87.0 HYPERNATREMIA: Status: ACTIVE | Noted: 2021-09-03

## 2021-09-03 PROBLEM — G93.41 METABOLIC ENCEPHALOPATHY: Status: ACTIVE | Noted: 2021-09-03

## 2021-09-03 LAB
ALBUMIN SERPL-MCNC: 3.4 G/DL (ref 3.5–5.2)
ALBUMIN/GLOB SERPL: 0.9 G/DL
ALP SERPL-CCNC: 63 U/L (ref 39–117)
ALT SERPL W P-5'-P-CCNC: 21 U/L (ref 1–41)
ANION GAP SERPL CALCULATED.3IONS-SCNC: 10.5 MMOL/L (ref 5–15)
AST SERPL-CCNC: 22 U/L (ref 1–40)
BH CV LOWER VASCULAR LEFT COMMON FEMORAL AUGMENT: NORMAL
BH CV LOWER VASCULAR LEFT COMMON FEMORAL COMPETENT: NORMAL
BH CV LOWER VASCULAR LEFT COMMON FEMORAL COMPRESS: NORMAL
BH CV LOWER VASCULAR LEFT COMMON FEMORAL PHASIC: NORMAL
BH CV LOWER VASCULAR LEFT COMMON FEMORAL SPONT: NORMAL
BH CV LOWER VASCULAR LEFT DISTAL FEMORAL COMPRESS: NORMAL
BH CV LOWER VASCULAR LEFT GASTRONEMIUS COMPRESS: NORMAL
BH CV LOWER VASCULAR LEFT GREATER SAPH AK COMPRESS: NORMAL
BH CV LOWER VASCULAR LEFT GREATER SAPH BK COMPRESS: NORMAL
BH CV LOWER VASCULAR LEFT LESSER SAPH COMPRESS: NORMAL
BH CV LOWER VASCULAR LEFT MID FEMORAL AUGMENT: NORMAL
BH CV LOWER VASCULAR LEFT MID FEMORAL COMPETENT: NORMAL
BH CV LOWER VASCULAR LEFT MID FEMORAL COMPRESS: NORMAL
BH CV LOWER VASCULAR LEFT MID FEMORAL PHASIC: NORMAL
BH CV LOWER VASCULAR LEFT MID FEMORAL SPONT: NORMAL
BH CV LOWER VASCULAR LEFT PERONEAL COMPRESS: NORMAL
BH CV LOWER VASCULAR LEFT POPLITEAL AUGMENT: NORMAL
BH CV LOWER VASCULAR LEFT POPLITEAL COMPETENT: NORMAL
BH CV LOWER VASCULAR LEFT POPLITEAL COMPRESS: NORMAL
BH CV LOWER VASCULAR LEFT POPLITEAL PHASIC: NORMAL
BH CV LOWER VASCULAR LEFT POPLITEAL SPONT: NORMAL
BH CV LOWER VASCULAR LEFT POSTERIOR TIBIAL COMPRESS: NORMAL
BH CV LOWER VASCULAR LEFT PROFUNDA FEMORAL COMPRESS: NORMAL
BH CV LOWER VASCULAR LEFT PROXIMAL FEMORAL COMPRESS: NORMAL
BH CV LOWER VASCULAR LEFT SAPHENOFEMORAL JUNCTION COMPRESS: NORMAL
BH CV LOWER VASCULAR RIGHT COMMON FEMORAL AUGMENT: NORMAL
BH CV LOWER VASCULAR RIGHT COMMON FEMORAL COMPETENT: NORMAL
BH CV LOWER VASCULAR RIGHT COMMON FEMORAL COMPRESS: NORMAL
BH CV LOWER VASCULAR RIGHT COMMON FEMORAL PHASIC: NORMAL
BH CV LOWER VASCULAR RIGHT COMMON FEMORAL SPONT: NORMAL
BH CV LOWER VASCULAR RIGHT DISTAL FEMORAL COMPRESS: NORMAL
BH CV LOWER VASCULAR RIGHT GASTRONEMIUS COMPRESS: NORMAL
BH CV LOWER VASCULAR RIGHT GREATER SAPH AK COMPRESS: NORMAL
BH CV LOWER VASCULAR RIGHT GREATER SAPH BK COMPRESS: NORMAL
BH CV LOWER VASCULAR RIGHT LESSER SAPH COMPRESS: NORMAL
BH CV LOWER VASCULAR RIGHT MID FEMORAL AUGMENT: NORMAL
BH CV LOWER VASCULAR RIGHT MID FEMORAL COMPETENT: NORMAL
BH CV LOWER VASCULAR RIGHT MID FEMORAL COMPRESS: NORMAL
BH CV LOWER VASCULAR RIGHT MID FEMORAL PHASIC: NORMAL
BH CV LOWER VASCULAR RIGHT MID FEMORAL SPONT: NORMAL
BH CV LOWER VASCULAR RIGHT PERONEAL COMPRESS: NORMAL
BH CV LOWER VASCULAR RIGHT POPLITEAL AUGMENT: NORMAL
BH CV LOWER VASCULAR RIGHT POPLITEAL COMPETENT: NORMAL
BH CV LOWER VASCULAR RIGHT POPLITEAL COMPRESS: NORMAL
BH CV LOWER VASCULAR RIGHT POPLITEAL PHASIC: NORMAL
BH CV LOWER VASCULAR RIGHT POPLITEAL SPONT: NORMAL
BH CV LOWER VASCULAR RIGHT POSTERIOR TIBIAL COMPRESS: NORMAL
BH CV LOWER VASCULAR RIGHT PROFUNDA FEMORAL COMPRESS: NORMAL
BH CV LOWER VASCULAR RIGHT PROXIMAL FEMORAL COMPRESS: NORMAL
BH CV LOWER VASCULAR RIGHT SAPHENOFEMORAL JUNCTION COMPRESS: NORMAL
BILIRUB CONJ SERPL-MCNC: 0.9 MG/DL (ref 0–0.3)
BILIRUB CONJ SERPL-MCNC: 1 MG/DL (ref 0–0.3)
BILIRUB SERPL-MCNC: 1.8 MG/DL (ref 0–1.2)
BUN SERPL-MCNC: 60 MG/DL (ref 8–23)
BUN/CREAT SERPL: 36.4 (ref 7–25)
CALCIUM SPEC-SCNC: 9.3 MG/DL (ref 8.6–10.5)
CHLORIDE SERPL-SCNC: 111 MMOL/L (ref 98–107)
CO2 SERPL-SCNC: 26.5 MMOL/L (ref 22–29)
CREAT SERPL-MCNC: 1.65 MG/DL (ref 0.76–1.27)
CRP SERPL-MCNC: 21.36 MG/DL (ref 0–0.5)
CRP SERPL-MCNC: 25.31 MG/DL (ref 0–0.5)
D DIMER PPP FEU-MCNC: 2.11 MCGFEU/ML (ref 0–0.49)
D-LACTATE SERPL-SCNC: 1.5 MMOL/L (ref 0.5–2)
DEPRECATED RDW RBC AUTO: 43.7 FL (ref 37–54)
ERYTHROCYTE [DISTWIDTH] IN BLOOD BY AUTOMATED COUNT: 12.9 % (ref 12.3–15.4)
FERRITIN SERPL-MCNC: 2712 NG/ML (ref 30–400)
GFR SERPL CREATININE-BSD FRML MDRD: 41 ML/MIN/1.73
GLOBULIN UR ELPH-MCNC: 3.7 GM/DL
GLUCOSE SERPL-MCNC: 119 MG/DL (ref 65–99)
HCT VFR BLD AUTO: 38.5 % (ref 37.5–51)
HGB BLD-MCNC: 12.9 G/DL (ref 13–17.7)
LYMPHOCYTES # BLD MANUAL: 0.05 10*3/MM3 (ref 0.7–3.1)
LYMPHOCYTES NFR BLD MANUAL: 1 % (ref 19.6–45.3)
LYMPHOCYTES NFR BLD MANUAL: 3 % (ref 5–12)
MAGNESIUM SERPL-MCNC: 3 MG/DL (ref 1.6–2.4)
MAXIMAL PREDICTED HEART RATE: 142 BPM
MCH RBC QN AUTO: 31.5 PG (ref 26.6–33)
MCHC RBC AUTO-ENTMCNC: 33.5 G/DL (ref 31.5–35.7)
MCV RBC AUTO: 94.1 FL (ref 79–97)
MONOCYTES # BLD AUTO: 0.16 10*3/MM3 (ref 0.1–0.9)
NEUTROPHILS # BLD AUTO: 4.96 10*3/MM3 (ref 1.7–7)
NEUTROPHILS NFR BLD MANUAL: 96 % (ref 42.7–76)
PLAT MORPH BLD: NORMAL
PLATELET # BLD AUTO: 310 10*3/MM3 (ref 140–450)
PMV BLD AUTO: 10.5 FL (ref 6–12)
POTASSIUM SERPL-SCNC: 4.2 MMOL/L (ref 3.5–5.2)
PROT SERPL-MCNC: 7.1 G/DL (ref 6–8.5)
RBC # BLD AUTO: 4.09 10*6/MM3 (ref 4.14–5.8)
RBC MORPH BLD: NORMAL
SODIUM SERPL-SCNC: 148 MMOL/L (ref 136–145)
STRESS TARGET HR: 121 BPM
WBC # BLD AUTO: 5.17 10*3/MM3 (ref 3.4–10.8)
WBC MORPH BLD: NORMAL

## 2021-09-03 PROCEDURE — 83735 ASSAY OF MAGNESIUM: CPT | Performed by: INTERNAL MEDICINE

## 2021-09-03 PROCEDURE — 85025 COMPLETE CBC W/AUTO DIFF WBC: CPT | Performed by: INTERNAL MEDICINE

## 2021-09-03 PROCEDURE — 97110 THERAPEUTIC EXERCISES: CPT

## 2021-09-03 PROCEDURE — 25010000002 AMPICILLIN PER 500 MG: Performed by: INTERNAL MEDICINE

## 2021-09-03 PROCEDURE — 63710000001 DEXAMETHASONE PER 0.25 MG: Performed by: INTERNAL MEDICINE

## 2021-09-03 PROCEDURE — 36415 COLL VENOUS BLD VENIPUNCTURE: CPT | Performed by: INTERNAL MEDICINE

## 2021-09-03 PROCEDURE — 82728 ASSAY OF FERRITIN: CPT | Performed by: INTERNAL MEDICINE

## 2021-09-03 PROCEDURE — 82248 BILIRUBIN DIRECT: CPT | Performed by: INTERNAL MEDICINE

## 2021-09-03 PROCEDURE — 83605 ASSAY OF LACTIC ACID: CPT | Performed by: EMERGENCY MEDICINE

## 2021-09-03 PROCEDURE — 25010000002 ENOXAPARIN PER 10 MG: Performed by: INTERNAL MEDICINE

## 2021-09-03 PROCEDURE — 85007 BL SMEAR W/DIFF WBC COUNT: CPT | Performed by: INTERNAL MEDICINE

## 2021-09-03 PROCEDURE — 97166 OT EVAL MOD COMPLEX 45 MIN: CPT

## 2021-09-03 PROCEDURE — 97162 PT EVAL MOD COMPLEX 30 MIN: CPT

## 2021-09-03 PROCEDURE — 85379 FIBRIN DEGRADATION QUANT: CPT | Performed by: INTERNAL MEDICINE

## 2021-09-03 PROCEDURE — 86140 C-REACTIVE PROTEIN: CPT | Performed by: INTERNAL MEDICINE

## 2021-09-03 PROCEDURE — 93970 EXTREMITY STUDY: CPT

## 2021-09-03 PROCEDURE — 80053 COMPREHEN METABOLIC PANEL: CPT | Performed by: INTERNAL MEDICINE

## 2021-09-03 PROCEDURE — 97535 SELF CARE MNGMENT TRAINING: CPT

## 2021-09-03 RX ORDER — SODIUM CHLORIDE 450 MG/100ML
100 INJECTION, SOLUTION INTRAVENOUS CONTINUOUS
Status: DISCONTINUED | OUTPATIENT
Start: 2021-09-03 | End: 2021-09-03

## 2021-09-03 RX ORDER — ACETAMINOPHEN 650 MG/1
650 SUPPOSITORY RECTAL EVERY 4 HOURS PRN
Status: DISCONTINUED | OUTPATIENT
Start: 2021-09-03 | End: 2021-09-15 | Stop reason: HOSPADM

## 2021-09-03 RX ORDER — FAMOTIDINE 20 MG/1
20 TABLET, FILM COATED ORAL 2 TIMES DAILY PRN
Status: DISCONTINUED | OUTPATIENT
Start: 2021-09-03 | End: 2021-09-15 | Stop reason: HOSPADM

## 2021-09-03 RX ORDER — CHOLECALCIFEROL (VITAMIN D3) 125 MCG
5 CAPSULE ORAL NIGHTLY PRN
Status: DISCONTINUED | OUTPATIENT
Start: 2021-09-03 | End: 2021-09-15 | Stop reason: HOSPADM

## 2021-09-03 RX ORDER — PANTOPRAZOLE SODIUM 40 MG/1
40 TABLET, DELAYED RELEASE ORAL EVERY MORNING
Status: DISCONTINUED | OUTPATIENT
Start: 2021-09-03 | End: 2021-09-15 | Stop reason: HOSPADM

## 2021-09-03 RX ORDER — ACETAMINOPHEN 325 MG/1
650 TABLET ORAL EVERY 4 HOURS PRN
Status: DISCONTINUED | OUTPATIENT
Start: 2021-09-03 | End: 2021-09-15 | Stop reason: HOSPADM

## 2021-09-03 RX ORDER — ACETAMINOPHEN 160 MG/5ML
650 SOLUTION ORAL EVERY 4 HOURS PRN
Status: DISCONTINUED | OUTPATIENT
Start: 2021-09-03 | End: 2021-09-15 | Stop reason: HOSPADM

## 2021-09-03 RX ORDER — ONDANSETRON 2 MG/ML
4 INJECTION INTRAMUSCULAR; INTRAVENOUS EVERY 6 HOURS PRN
Status: DISCONTINUED | OUTPATIENT
Start: 2021-09-03 | End: 2021-09-15 | Stop reason: HOSPADM

## 2021-09-03 RX ORDER — ONDANSETRON 4 MG/1
4 TABLET, FILM COATED ORAL EVERY 6 HOURS PRN
Status: DISCONTINUED | OUTPATIENT
Start: 2021-09-03 | End: 2021-09-15 | Stop reason: HOSPADM

## 2021-09-03 RX ORDER — SODIUM CHLORIDE 9 MG/ML
100 INJECTION, SOLUTION INTRAVENOUS CONTINUOUS
Status: DISCONTINUED | OUTPATIENT
Start: 2021-09-03 | End: 2021-09-03

## 2021-09-03 RX ORDER — SODIUM CHLORIDE 0.9 % (FLUSH) 0.9 %
3-10 SYRINGE (ML) INJECTION AS NEEDED
Status: DISCONTINUED | OUTPATIENT
Start: 2021-09-03 | End: 2021-09-15 | Stop reason: HOSPADM

## 2021-09-03 RX ORDER — NITROGLYCERIN 0.4 MG/1
0.4 TABLET SUBLINGUAL
Status: DISCONTINUED | OUTPATIENT
Start: 2021-09-03 | End: 2021-09-15 | Stop reason: HOSPADM

## 2021-09-03 RX ORDER — DOCUSATE SODIUM 100 MG/1
100 CAPSULE, LIQUID FILLED ORAL 2 TIMES DAILY PRN
Status: DISCONTINUED | OUTPATIENT
Start: 2021-09-03 | End: 2021-09-15 | Stop reason: HOSPADM

## 2021-09-03 RX ORDER — SODIUM CHLORIDE 0.9 % (FLUSH) 0.9 %
3 SYRINGE (ML) INJECTION EVERY 12 HOURS SCHEDULED
Status: DISCONTINUED | OUTPATIENT
Start: 2021-09-03 | End: 2021-09-15 | Stop reason: HOSPADM

## 2021-09-03 RX ORDER — SODIUM CHLORIDE 450 MG/100ML
125 INJECTION, SOLUTION INTRAVENOUS CONTINUOUS
Status: DISCONTINUED | OUTPATIENT
Start: 2021-09-03 | End: 2021-09-04

## 2021-09-03 RX ADMIN — PANTOPRAZOLE SODIUM 40 MG: 40 TABLET, DELAYED RELEASE ORAL at 09:37

## 2021-09-03 RX ADMIN — SODIUM CHLORIDE, PRESERVATIVE FREE 3 ML: 5 INJECTION INTRAVENOUS at 19:47

## 2021-09-03 RX ADMIN — SODIUM CHLORIDE, PRESERVATIVE FREE 3 ML: 5 INJECTION INTRAVENOUS at 03:50

## 2021-09-03 RX ADMIN — AMPICILLIN SODIUM 1 G: 1 INJECTION, POWDER, FOR SOLUTION INTRAMUSCULAR; INTRAVENOUS at 13:03

## 2021-09-03 RX ADMIN — SODIUM CHLORIDE 125 ML/HR: 4.5 INJECTION, SOLUTION INTRAVENOUS at 22:23

## 2021-09-03 RX ADMIN — AMPICILLIN SODIUM 1 G: 1 INJECTION, POWDER, FOR SOLUTION INTRAMUSCULAR; INTRAVENOUS at 06:44

## 2021-09-03 RX ADMIN — ENOXAPARIN SODIUM 40 MG: 40 INJECTION SUBCUTANEOUS at 09:36

## 2021-09-03 RX ADMIN — AMPICILLIN SODIUM 1 G: 1 INJECTION, POWDER, FOR SOLUTION INTRAMUSCULAR; INTRAVENOUS at 01:33

## 2021-09-03 RX ADMIN — AMPICILLIN SODIUM 1 G: 1 INJECTION, POWDER, FOR SOLUTION INTRAMUSCULAR; INTRAVENOUS at 18:30

## 2021-09-03 RX ADMIN — DEXAMETHASONE 6 MG: 4 TABLET ORAL at 09:36

## 2021-09-03 RX ADMIN — SODIUM CHLORIDE, PRESERVATIVE FREE 3 ML: 5 INJECTION INTRAVENOUS at 09:37

## 2021-09-03 RX ADMIN — SODIUM CHLORIDE 100 ML/HR: 4.5 INJECTION, SOLUTION INTRAVENOUS at 03:50

## 2021-09-03 NOTE — CASE MANAGEMENT/SOCIAL WORK
Continued Stay Note  [facility]     Patient Name: Jake Kan  MRN: [id number]  Today's Date: 9/3/2021    Admit Date: 9/2/2021    Discharge Plan     Row Name 09/03/21 1026       Plan    Plan Comments  Pt's only contact is his girlfriend Kary Charles who is also in hospital. Kary's daughter (Aliza Solo [phone]) is going to look at home for some contacts for pt. Will need to f/u with her for contacts        Discharge Codes    No documentation.             KEATON Al

## 2021-09-03 NOTE — ED NOTES
"Nursing report ED to floor  Jake Kan  76 y.o.  male    HPI (triage note):   Chief Complaint   Patient presents with   • Altered Mental Status       Admitting doctor:   Benjamin Loera MD    Admitting diagnosis:   The primary encounter diagnosis was Pneumonia due to COVID-19 virus. Diagnoses of Acute kidney injury (ANA) with acute tubular necrosis (ATN) (CMS/HCC), Acute UTI, and Altered mental status, unspecified altered mental status type were also pertinent to this visit.    Code status:   Current Code Status     Date Active Code Status Order ID Comments User Context       Not on file    Advance Care Planning Activity          Allergies:   Patient has no known allergies.    Weight:       09/02/21 2028   Weight: 74.8 kg (165 lb)       Most recent vitals:   Vitals:    09/02/21 2020 09/02/21 2028 09/02/21 2201 09/02/21 2231   BP:   117/57 104/50   Pulse:    65   Resp:       Temp: 99.6 °F (37.6 °C)      TempSrc: Oral      SpO2:    97%   Weight:  74.8 kg (165 lb)     Height:  180.3 cm (71\")         Active LDAs/IV Access:   Lines, Drains & Airways    Active LDAs     Name:   Placement date:   Placement time:   Site:   Days:    Peripheral IV 09/02/21 1954 Left Antecubital   09/02/21 1954    Antecubital   less than 1    Peripheral IV 09/02/21 2045 Right;Upper Arm   09/02/21 2045    Arm   less than 1                Labs (abnormal labs have a star):   Labs Reviewed   COMPREHENSIVE METABOLIC PANEL - Abnormal; Notable for the following components:       Result Value    Glucose 116 (*)     BUN 62 (*)     Creatinine 1.77 (*)     Sodium 147 (*)     Chloride 108 (*)     Total Bilirubin 2.3 (*)     eGFR Non African Amer 37 (*)     BUN/Creatinine Ratio 35.0 (*)     All other components within normal limits    Narrative:     GFR Normal >60  Chronic Kidney Disease <60  Kidney Failure <15     PROTIME-INR - Abnormal; Notable for the following components:    Protime 16.2 (*)     INR 1.32 (*)     All other components " "within normal limits   URINALYSIS W/ CULTURE IF INDICATED - Abnormal; Notable for the following components:    Color, UA Dark Yellow (*)     Appearance, UA Cloudy (*)     Ketones, UA Trace (*)     Bilirubin, UA Moderate (2+) (*)     Blood, UA Small (1+) (*)     Protein,  mg/dL (2+) (*)     Leuk Esterase, UA Trace (*)     Nitrite, UA Positive (*)     Urobilinogen, UA 2.0 E.U./dL (*)     All other components within normal limits   LACTIC ACID, PLASMA - Abnormal; Notable for the following components:    Lactate 2.2 (*)     All other components within normal limits   MANUAL DIFFERENTIAL - Abnormal; Notable for the following components:    Neutrophil % 91.9 (*)     Lymphocyte % 3.0 (*)     Lymphocytes Absolute 0.22 (*)     All other components within normal limits   URINALYSIS, MICROSCOPIC ONLY - Abnormal; Notable for the following components:    Bacteria, UA 2+ (*)     Transitional Epithelial Cells, UA 7-12 (*)     All other components within normal limits   LIPASE - Normal   PROCALCITONIN - Normal    Narrative:     As a Marker for Sepsis (Non-Neonates):     1. <0.5 ng/mL represents a low risk of severe sepsis and/or septic shock.  2. >2 ng/mL represents a high risk of severe sepsis and/or septic shock.    As a Marker for Lower Respiratory Tract Infections that require antibiotic therapy:  PCT on Admission     Antibiotic Therapy             6-12 Hrs later  >0.5                          Strongly Recommended            >0.25 - <0.5             Recommended  0.1 - 0.25                  Discouraged                       Remeasure/reassess PCT  <0.1                         Strongly Discouraged         Remeasure/reassess PCT      As 28 day mortality risk marker: \"Change in Procalcitonin Result\" (>80% or <=80%) if Day 0 (or Day 1) and Day 4 values are available. Refer to http://www.[employer]-pct-calculator.com/    Change in PCT <=80 %   A decrease of PCT levels below or equal to 80% defines a positive change in PCT test " result representing a higher risk for 28-day all-cause mortality of patients diagnosed with severe sepsis or septic shock.    Change in PCT >80 %   A decrease of PCT levels of more than 80% defines a negative change in PCT result representing a lower risk for 28-day all-cause mortality of patients diagnosed with severe sepsis or septic shock.              Results may be falsely decreased if patient taking Biotin.    CBC WITH AUTO DIFFERENTIAL - Normal   BLOOD CULTURE   BLOOD CULTURE   LACTIC ACID, REFLEX   CBC AND DIFFERENTIAL    Narrative:     The following orders were created for panel order CBC & Differential.  Procedure                               Abnormality         Status                     ---------                               -----------         ------                     CBC Auto Differential[[id number]]        Normal              Final result                 Please view results for these tests on the individual orders.       EKG:   No orders to display       Meds given in ED:   Medications   sodium chloride 0.9 % flush 10 mL (has no administration in time range)   lactated ringers bolus 500 mL (0 mL Intravenous Stopped 9/2/21 2155)   cefTRIAXone (ROCEPHIN) 1 g in sodium chloride 0.9 % 100 mL IVPB-VTB (0 g Intravenous Stopped 9/2/21 2300)   dexamethasone (DECADRON) injection 6 mg (6 mg Intravenous Given 9/2/21 2236)       Imaging results:  XR Chest 1 View    Result Date: 9/2/2021  Electronically signed by Jd Grande MD on 09-02-21 at 2156      Ambulatory status:   - up with assist only    Social issues:   Social History     Socioeconomic History   • Marital status: Single     Spouse name: Not on file   • Number of children: Not on file   • Years of education: Not on file   • Highest education level: Not on file   Tobacco Use   • Smoking status: Never Smoker   • Smokeless tobacco: Never Used   Substance and Sexual Activity   • Alcohol use: No   • Drug use: No   • Sexual activity: Defer    Nursing  report ED to [facility]     Marie Levi RN  09/02/21 2891

## 2021-09-03 NOTE — PROGRESS NOTES
"Pharmacy Consult - Ampicillin    Pt Name: Jake Kan   MRN: [id number]  : 1945  / Age: 76 y.o.  Weight: 74.8 kg (165 lb)  BMI: Body mass index is 23.01 kg/m².        Jake Kan has been consulted for Ampicillin dosing for UTI  Pharmacy dosing per Dr Loera's request.       Relevant clinical data and objective history reviewed:  76 y.o. male 180.3 cm (71\")   74.8 kg (165 lb)   Ideal body weight: 75.3 kg (166 lb 0.1 oz)    Active Ambulatory Problems     Diagnosis Date Noted   • Sciatica of right side 2017   • Phlegm in throat 2017   • Heartburn 2017   • Abnormal EKG 2012   • Fatigue 2012   • Hyperlipidemia 2011   • Other symptoms involving digestive system 2011   • Paresthesias 2012   • Pruritus 2012   • Rosacea 2012   • Seborrheic dermatitis 2012     Resolved Ambulatory Problems     Diagnosis Date Noted   • Abscess, umbilical 2019     No Additional Past Medical History     Allergies:   Allergies as of 2021   • (No Known Allergies)       Other Antibiotics/Anti-infectives on profile:        Lab Results   Component Value Date    GLUCOSE 116 (H) 2021    CALCIUM 9.7 2021     (H) 2021    K 3.8 2021    CO2 26.2 2021     (H) 2021    BUN 62 (H) 2021    CREATININE 1.77 (H) 2021    EGFRIFAFRI 71 2019    EGFRIFNONA 37 (L) 2021    BCR 35.0 (H) 2021    ANIONGAP 12.8 2021     Lab Results   Component Value Date    WBC 7.32 2021    HGB 14.0 2021    HCT 40.6 2021    MCV 92.5 2021     2021       Estimated Creatinine Clearance: 36.4 mL/min (A) (by C-G formula based on SCr of 1.77 mg/dL (H)).  No intake/output data recorded.  Temp Readings from Last 3 Encounters:   21 99.6 °F (37.6 °C) (Oral)   21 98.3 °F (36.8 °C) (Temporal)   21 97.8 °F (36.6 °C)         Assessment/Plan    Will start patient on Ampicillin 1 gm " iv q6h     Renal function will continue to be monitored and dosing adjustments will be made by pharmacy based on renal function if necessary      Brett Jaime RPH

## 2021-09-03 NOTE — THERAPY EVALUATION
Patient Name: Jake Kan  : 1945    MRN: [id number]                              Today's Date: 9/3/2021       Admit Date: 2021    Visit Dx:     ICD-10-CM ICD-9-CM   1. Pneumonia due to COVID-19 virus  U07.1 480.8    J12.82 079.89   2. Acute kidney injury (ANA) with acute tubular necrosis (ATN) (CMS/HCC)  N17.0 584.5   3. Acute UTI  N39.0 599.0   4. Altered mental status, unspecified altered mental status type  R41.82 780.97     Patient Active Problem List   Diagnosis   • Sciatica of right side   • Phlegm in throat   • Heartburn   • Abnormal EKG   • Debility   • Hyperlipidemia   • Other symptoms involving digestive system   • Paresthesias   • Pruritus   • Rosacea   • Seborrheic dermatitis   • Pneumonia due to COVID-19 virus   • Metabolic encephalopathy   • Dehydration   • Hypernatremia   • ANA (acute kidney injury) (CMS/HCC)   • Stage 3a chronic kidney disease (CMS/HCC)   • Positive D-dimer   • Acute cystitis   • Hypoxia     History reviewed. No pertinent past medical history.  Past Surgical History:   Procedure Laterality Date   • HERNIA REPAIR     • UMBILICAL HERNIA REPAIR N/A 2019    Procedure: excision of umbilical hernia mesh and umbilical hernia repair;  Surgeon: Luan Dowell MD;  Location: [facility] OR;  Service: General   • WRIST FRACTURE SURGERY Left      General Information     Row Name 21 1257          OT Time and Intention    Document Type  evaluation  -BL     Mode of Treatment  individual therapy;occupational therapy  -BL     Row Name 21 1257          General Information    Patient Profile Reviewed  yes  -BL     Prior Level of Function  independent:;ADL's;feeding;grooming;dressing;bathing  -BL     Existing Precautions/Restrictions  fall  -BL     Barriers to Rehab  medically complex;previous functional deficit;cognitive status  -BL     Row Name 21 1257          Living Environment    Lives With  significant other  -BL     Row Name 21 1257           Cognition    Orientation Status (Cognition)  oriented to;person;time  -     Row Name 09/03/21 1257          Safety Issues, Functional Mobility    Safety Issues Affecting Function (Mobility)  insight into deficits/self-awareness;awareness of need for assistance;judgment;problem-solving;safety precaution awareness;safety precautions follow-through/compliance  -     Impairments Affecting Function (Mobility)  balance;endurance/activity tolerance;strength;cognition  -     Cognitive Impairments, Mobility Safety/Performance  awareness, need for assistance;insight into deficits/self-awareness;judgment;problem-solving/reasoning;safety precaution awareness;safety precaution follow-through  -       User Key  (r) = Recorded By, (t) = Taken By, (c) = Cosigned By    Initials Name Provider Type     Cara Baker OT Occupational Therapist          Mobility/ADL's     Row Name 09/03/21 1258          Bed Mobility    Bed Mobility  rolling left;rolling right;supine-sit  -BL     Rolling Left Randall (Bed Mobility)  moderate assist (50% patient effort);2 person assist;verbal cues  -     Rolling Right [place] (Bed Mobility)  moderate assist (50% patient effort);2 person assist;verbal cues  -     Supine-Sit [place] (Bed Mobility)  moderate assist (50% patient effort);verbal cues;nonverbal cues (demo/gesture)  -     Assistive Device (Bed Mobility)  bed rails;head of bed elevated  -     Row Name 09/03/21 1258          Transfers    Transfers  sit-stand transfer;bed-chair transfer  -     Bed-Chair Randall (Transfers)  moderate assist (50% patient effort)  -     Sit-Stand Randall (Transfers)  moderate assist (50% patient effort)  -     Row Name 09/03/21 1258          Activities of Daily Living    BADL Assessment/Intervention  bathing;lower body dressing  -     Row Name 09/03/21 1258          Bathing Assessment/Intervention    [place] Level (Bathing)  maximum assist (25% patient  effort);upper extremities;upper body;lower body;chest/trunk;distal lower extremities/feet;proximal lower extremities;perineal area;bathing skills  -BL     Position (Bathing)  supine  -BL     Row Name 09/03/21 1258          Lower Body Dressing Assessment/Training    [place] Level (Lower Body Dressing)  doff;don;minimum assist (75% patient effort);verbal cues  -BL     Position (Lower Body Dressing)  edge of bed sitting  -BL       User Key  (r) = Recorded By, (t) = Taken By, (c) = Cosigned By    Initials Name Provider Type    BL Cara Baker OT Occupational Therapist        Obj/Interventions     Row Name 09/03/21 1259          Sensory Assessment (Somatosensory)    Sensory Assessment (Somatosensory)  unable/difficult to assess  -BL     Row Name 09/03/21 1259          Vision Assessment/Intervention    Visual Impairment/Limitations  WFL  -BL     Row Name 09/03/21 1259          Range of Motion Comprehensive    General Range of Motion  bilateral upper extremity ROM WFL  -     Row Name 09/03/21 1259          Strength Comprehensive (MMT)    Comment, General Manual Muscle Testing (MMT) Assessment  BUE 3+/5  -BL     Row Name 09/03/21 1259          Balance    Balance Assessment  sitting static balance;sitting dynamic balance;sit to stand dynamic balance;standing static balance;standing dynamic balance  -BL     Static Sitting Balance  WFL;unsupported;sitting, edge of bed  -BL     Dynamic Sitting Balance  WFL;supported;sitting in chair  -BL     Sit to Stand Dynamic Balance  mild impairment;supported;standing  -BL     Static Standing Balance  mild impairment;supported;standing  -BL     Dynamic Standing Balance  mild impairment;supported;standing  -BL     Balance Interventions  sitting;standing;sit to stand;supported;static;dynamic;occupation based/functional task  -BL       User Key  (r) = Recorded By, (t) = Taken By, (c) = Cosigned By    Initials Name Provider Type    BL Cara Baker OT Occupational Therapist         Goals/Plan     Row Name 09/03/21 1300          Bed Mobility Goal 1 (OT)    Activity/Assistive Device (Bed Mobility Goal 1, OT)  bed mobility activities, all  -BL     [place] Level/Cues Needed (Bed Mobility Goal 1, OT)  contact guard assist  -BL     Time Frame (Bed Mobility Goal 1, OT)  short term goal (STG);2 weeks  -BL     Progress/Outcomes (Bed Mobility Goal 1, OT)  continuing progress toward goal  -BL     Row Name 09/03/21 1300          Transfer Goal 1 (OT)    Activity/Assistive Device (Transfer Goal 1, OT)  sit-to-stand/stand-to-sit;bed-to-chair/chair-to-bed;commode, 3-in-1  -BL     [place] Level/Cues Needed (Transfer Goal 1, OT)  contact guard assist  -BL     Time Frame (Transfer Goal 1, OT)  short term goal (STG);2 weeks  -BL     Progress/Outcome (Transfer Goal 1, OT)  continuing progress toward goal  -BL     Row Name 09/03/21 1300          Bathing Goal 1 (OT)    Activity/Device (Bathing Goal 1, OT)  upper body bathing;lower body bathing  -BL     [place] Level/Cues Needed (Bathing Goal 1, OT)  minimum assist (75% or more patient effort)  -BL     Time Frame (Bathing Goal 1, OT)  short term goal (STG);2 weeks  -BL     Progress/Outcomes (Bathing Goal 1, OT)  continuing progress toward goal  -BL     Row Name 09/03/21 1300          Dressing Goal 1 (OT)    Activity/Device (Dressing Goal 1, OT)  upper body dressing;lower body dressing  -BL     [place]/Cues Needed (Dressing Goal 1, OT)  supervision required  -BL     Time Frame (Dressing Goal 1, OT)  short term goal (STG);2 weeks  -BL     Progress/Outcome (Dressing Goal 1, OT)  continuing progress toward goal  -BL     Row Name 09/03/21 1300          Strength Goal 1 (OT)    Strength Goal 1 (OT)  Pt will increase BUE strength to 4/5 prior to D/C.  -BL     Time Frame (Strength Goal 1, OT)  short term goal (STG);2 weeks  -BL     Progress/Outcome (Strength Goal 1, OT)  continuing progress toward goal  -BL     Row Name 09/03/21 1300          Therapy  Assessment/Plan (OT)    Planned Therapy Interventions (OT)  activity tolerance training;functional balance retraining;occupation/activity based interventions;IADL retraining;BADL retraining;patient/caregiver education/training;transfer/mobility retraining;strengthening exercise;ROM/therapeutic exercise  -       User Key  (r) = Recorded By, (t) = Taken By, (c) = Cosigned By    Initials Name Provider Type     Cara Baker OT Occupational Therapist        Clinical Impression     Row Name 09/03/21 1255          Pain Assessment    Additional Documentation  Pain Scale: Numbers Pre/Post-Treatment (Group)  -     Row Name 09/03/21 1254          Pain Scale: Numbers Pre/Post-Treatment    Pretreatment Pain Rating  0/10 - no pain  -BL     Posttreatment Pain Rating  0/10 - no pain  -BL     Row Name 09/03/21 125          Plan of Care Review    Plan of Care Reviewed With  patient  -     Progress  improving  -     Outcome Summary  Pt seen by OT this date for evaluation. Upon arrival pt lying supine in bed, no c/o pain, A&O x2 with verbal cues. OT assisted aide in bathing task as pt required Mod A x2 for rolling in bed and Max A for bathing task. Pt performed supine>sit transition with Mod A and verbal cues to sit EOB. Pt performed LB dressing task with Min A. Pt performed sit>stand transition with HHA x1 and Min-Mod A to transfer from bed>chair. Pt left sitting up in chair, needs in reach, alarm on. Pt to benefit from skilled OT services to address goals and deficits. OT rec SNF at D/C. OT wore mask, gloves, glasses, hand hygiene performed, appropriate ppe worn.  -     Row Name 09/03/21 1257          Therapy Assessment/Plan (OT)    Rehab Potential (OT)  good, to achieve stated therapy goals  -     Criteria for Skilled Therapeutic Interventions Met (OT)  yes;skilled treatment is necessary  -     Therapy Frequency (OT)  5 times/wk  -     Row Name 09/03/21 1257          Therapy Plan Review/Discharge Plan (OT)     Anticipated Discharge Disposition (OT)  [facility] nursing facility  -     Row Name 09/03/21 1254          Vital Signs    Pre Patient Position  Supine  -BL     Intra Patient Position  Standing  -BL     Post Patient Position  Sitting  -BL     Row Name 09/03/21 1254          Positioning and Restraints    Pre-Treatment Position  in bed  -BL     Post Treatment Position  chair  -BL     In Chair  notified nsg;reclined;call light within reach;encouraged to call for assist;exit alarm on  -BL       User Key  (r) = Recorded By, (t) = Taken By, (c) = Cosigned By    Initials Name Provider Type    Cara Quiroz, LETICIA Occupational Therapist        Outcome Measures     Row Name 09/03/21 1301          How much help from another is currently needed...    Putting on and taking off regular lower body clothing?  3  -BL     Bathing (including washing, rinsing, and drying)  2  -BL     Toileting (which includes using toilet bed pan or urinal)  2  -BL     Putting on and taking off regular upper body clothing  3  -BL     Taking care of personal grooming (such as brushing teeth)  3  -BL     Eating meals  3  -BL     AM-PAC 6 Clicks Score (OT)  16  -BL     Row Name 09/03/21 1241          How much help from another person do you currently need...    Turning from your back to your side while in flat bed without using bedrails?  3  -DJ     Moving from lying on back to sitting on the side of a flat bed without bedrails?  3  -DJ     Moving to and from a bed to a chair (including a wheelchair)?  3  -DJ     Standing up from a chair using your arms (e.g., wheelchair, bedside chair)?  3  -DJ     Climbing 3-5 steps with a railing?  2  -DJ     To walk in hospital room?  3  -DJ     AM-PAC 6 Clicks Score (PT)  17  -DJ     Row Name 09/03/21 1301          Modified [place] Scale    Modified [place] Scale  4 - Moderately severe disability.  Unable to walk without assistance, and unable to attend to own bodily needs without assistance.  -     Row Name  09/03/21 1301 09/03/21 1241       Functional Assessment    Outcome Measure Options  AM-PAC 6 Clicks Daily Activity (OT);Modified [place]  -  AM-PAC 6 Clicks Basic Mobility (PT)  -DJ      User Key  (r) = Recorded By, (t) = Taken By, (c) = Cosigned By    Initials Name Provider Type    DJ Dinora Torres, PT Physical Therapist    Cara Quiroz OT Occupational Therapist          Occupational Therapy Education                 Title: PT OT SLP Therapies (In Progress)     Topic: Occupational Therapy (In Progress)     Point: ADL training (Done)     Description:   Instruct learner(s) on proper safety adaptation and remediation techniques during self care or transfers.   Instruct in proper use of assistive devices.              Learning Progress Summary           Patient Acceptance, E, VU by  at 9/3/2021 1302    Comment: The role of OT                   Point: Home exercise program (Not Started)     Description:   Instruct learner(s) on appropriate technique for monitoring, assisting and/or progressing therapeutic exercises/activities.              Learner Progress:  Not documented in this visit.          Point: Precautions (Not Started)     Description:   Instruct learner(s) on prescribed precautions during self-care and functional transfers.              Learner Progress:  Not documented in this visit.          Point: Body mechanics (Not Started)     Description:   Instruct learner(s) on proper positioning and spine alignment during self-care, functional mobility activities and/or exercises.              Learner Progress:  Not documented in this visit.                      User Key     Initials Effective Dates Name Provider Type Discipline     01/05/21 -  Cara Baker OT Occupational Therapist OT              OT Recommendation and Plan  Planned Therapy Interventions (OT): activity tolerance training, functional balance retraining, occupation/activity based interventions, IADL retraining, BADL retraining,  patient/caregiver education/training, transfer/mobility retraining, strengthening exercise, ROM/therapeutic exercise  Therapy Frequency (OT): 5 times/wk  Plan of Care Review  Plan of Care Reviewed With: patient  Progress: improving  Outcome Summary: Pt seen by OT this date for evaluation. Upon arrival pt lying supine in bed, no c/o pain, A&O x2 with verbal cues. OT assisted aide in bathing task as pt required Mod A x2 for rolling in bed and Max A for bathing task. Pt performed supine>sit transition with Mod A and verbal cues to sit EOB. Pt performed LB dressing task with Min A. Pt performed sit>stand transition with HHA x1 and Min-Mod A to transfer from bed>chair. Pt left sitting up in chair, needs in reach, alarm on. Pt to benefit from skilled OT services to address goals and deficits. OT rec SNF at D/C. OT wore mask, gloves, glasses, hand hygiene performed, appropriate ppe worn.     Time Calculation:   Time Calculation- OT     Row Name 09/03/21 1302             Time Calculation- OT    OT Start Time  1011  -BL      OT Stop Time  1037  -BL      OT Time Calculation (min)  26 min  -BL      Total Timed Code Minutes- OT  21 minute(s)  -BL      OT Received On  09/03/21  -BL      OT - Next Appointment  09/06/21  -BL      OT Goal Re-Cert Due Date  09/17/21  -BL         Timed Charges    32558 - OT Self Care/Mgmt Minutes  21  -BL         Untimed Charges    OT Eval/Re-eval Minutes  5  -BL         Total Minutes    Timed Charges Total Minutes  21  -BL      Untimed Charges Total Minutes  5  -BL       Total Minutes  26  -BL        User Key  (r) = Recorded By, (t) = Taken By, (c) = Cosigned By    Initials Name Provider Type    BL Cara Baker OT Occupational Therapist        Therapy Charges for Today     Code Description Service Date Service Provider Modifiers Qty    80404024374 HC OT SELF CARE/MGMT/TRAIN EA 15 MIN 9/3/2021 Cara Baker OT GO 1    49201792787 HC OT EVAL MOD COMPLEXITY 2 9/3/2021 Cara Baker OT GO 1                Cara Baker, OT  9/3/2021

## 2021-09-03 NOTE — PLAN OF CARE
Goal Outcome Evaluation:  Plan of Care Reviewed With: patient           Outcome Summary: 75yo white male admitted 9/2/21 after well check from police found pt naked and surrounded by moldy/dry food. Pt found to have UTI and Covid. PMH includes metabolic brain disorder, ANA, CKD 3a, bladder inflammatio, heartburn, high cholesterol. PLOF: Pt lives at home with girlfriend, reports he has never used AD. He is primarily limited at this time by generalized weakness and decreased activity tolerance that limits his functional mobility. Today, he was UIC soaked in urine, agreeable to PT. He req min A for sit/stand from recliner and was able to amb 20' with r wx and min A and vc for posture. Pt req min A for sit/supine and to reposition self in bed. Gt is very slow and unsteady. O2 sates 93% after amb without O2 He would benefit from skilled PT for there x, gt/transfer training, bed mobility, balance, and endurance as tolerated.  Patient was intermittently wearing a face mask during this therapy encounter. Therapist used appropriate personal protective equipment including eye protection, mask, and gloves, and gown. Mask used was  CaPR . Appropriate PPE was worn during the entire therapy session. Hand hygiene was completed before and after therapy session. Patient is in enhanced droplet precautions.

## 2021-09-03 NOTE — ED PROVIDER NOTES
MD ATTESTATION NOTE    The EDVIN and I have discussed this patient's history, physical exam, and treatment plan.  I have reviewed the documentation and personally had a face to face interaction with the patient. I affirm the documentation and agree with the treatment and plan.  The attached note describes my personal findings.      Jake Kan is a 76 y.o. male who presents to the ED c/o being brought by EMS after police were called to the house and found him confused.  EMS reports the patient was found naked in the bed and food was laying everywhere but was moldy and dry.  The patient is alert and oriented but is ill-appearing.  He is not able to provide much history.  Her records show that he is Covid positive.  He denies complaints.      On exam:  GENERAL: Awake, alert, no acute distress, ill-appearing  SKIN: Warm, dry  HENT: Normocephalic, atraumatic  EYES: no scleral icterus  CV: regular rhythm, regular rate  RESPIRATORY: normal effort, lungs clear  ABDOMEN: soft, non-tender, non-distended  MUSCULOSKELETAL: no deformity  NEURO: alert, moves all extremities, follows commands    Labs  No results found for this or any previous visit (from the past 24 hour(s)).    Radiology  No Radiology Exams Resulted Within Past 24 Hours    Medical Decision Making:       Plan laboratory evaluation, chest x-ray, admission.    PPE: Both the patient and I wore a CA AR mask throughout the entire patient encounter. I wore protective goggles.  I wore down    The patient qualifies to receive the vaccine, but they have not yet received it.    Diagnosis  Final diagnoses:   None        Fernando Murphy MD  09/02/21 2034

## 2021-09-03 NOTE — PLAN OF CARE
Goal Outcome Evaluation:  Plan of Care Reviewed With: patient        Progress: no change  Outcome Summary: Pt admitted from ED, altered mental status- ability to answer questions varies, iv fluids and antibiotics as ordered, falls education given and alarms in use, reg diet, 4L NC, vitals as charted, compliant with care at this point

## 2021-09-03 NOTE — ED NOTES
Patient was placed in face mask in first look. Patient was wearing facemask when I entered the room and throughout our encounter. I wore full protective equipment throughout this patient encounter including a face mask, and gloves. Hand hygiene was performed before donning protective equipment and after removal when leaving the room.     Marie Levi RN  09/02/21 [id number]

## 2021-09-03 NOTE — H&P
[facility] Medicine Services  HISTORY AND PHYSICAL    Patient Name: Jake Kan  : 1945  MRN: [id number]  Primary Care Physician: Sree Thomas MD  Date of admission: 2021      Subjective   Subjective     Chief Complaint:  Confusion    HPI:  Jake Kan is a 76 y.o. male presents to the emergency department by EMS.  History was taken per emergency room physician as patient is a very poor historian and unable to provide much additional history.  Per ER reports patient's neighbor called the police and patient was found naked in bed appearing dehydrated.  Patient was positive for COVID-19 on .  Patient has productive cough and shortness of breath.  He was seen on 2 L nasal cannula.  He was given dexamethasone in the emergency room and is being admitted for COVID-19 pneumonia with hypoxia and other acute issues    Review of Systems   Constitutional: Positive for chills and fatigue.   HENT: Negative for sinus pressure and sinus pain.    Eyes: Negative.    Respiratory: Positive for cough and shortness of breath.    Cardiovascular: Negative for chest pain and palpitations.   Gastrointestinal: Negative for diarrhea and nausea.   Endocrine: Negative.    Genitourinary: Negative for dysuria and hematuria.   Musculoskeletal: Negative.    Skin: Negative for pallor and rash.   Allergic/Immunologic: Negative.    Neurological: Negative for light-headedness and headaches.   Hematological: Negative for adenopathy. Does not bruise/bleed easily.   Psychiatric/Behavioral: Negative for agitation and hallucinations.          Personal History     Past medical history:  Denies significant history other than hernia repair    Past Surgical History:   Procedure Laterality Date   • HERNIA REPAIR     • UMBILICAL HERNIA REPAIR N/A 2019    Procedure: excision of umbilical hernia mesh and umbilical hernia repair;  Surgeon: Luan Dowell MD;  Location: [facility] MAIN OR;  Service: General   • WRIST FRACTURE  SURGERY Left 1998       Family History: family history includes COPD in his sister.     Social History:  reports that he has never smoked. He has never used smokeless tobacco. He reports that he does not drink alcohol and does not use drugs.      Medications:  Available home medication information reviewed.      No Known Allergies    Objective   Objective     Vital Signs:   Temp:  [99.2 °F (37.3 °C)-99.6 °F (37.6 °C)] 99.6 °F (37.6 °C)  Heart Rate:  [53-81] 60  Resp:  [24] 24  BP: (104-146)/(50-75) 146/75        Physical Exam   Constitutional: Awake, alert, ill-appearing  Eyes: Pupils equal, sclerae anicteric, no conjunctival injection  HENT: NCAT, mucous membranes are dry  Neck: Supple, no thyromegaly, no lymphadenopathy, trachea midline  Respiratory: Coarse respiratory sounds, productive cough, tachypnea, currently on supplemental oxygen  Cardiovascular: RRR, palpable radial pulses bilaterally  Gastrointestinal: Positive bowel sounds, soft, nontender, nondistended  Musculoskeletal: Frail and chronically debilitated in appearance, no bilateral lower extremity edema  Psychiatric: Confused affect, cooperative  Neurologic: Oriented x 3 but confused to more complicated questions, can follow commands, very poor historian, strength symmetric in all extremities, Cranial Nerves grossly intact to confrontation, speech clear  Skin: No rashes or jaundice    Results from last 7 days   Lab Units 09/02/21 2050   WBC 10*3/mm3 7.32   HEMOGLOBIN g/dL 14.0   HEMATOCRIT % 40.6   PLATELETS 10*3/mm3 343   INR  1.32*     Results from last 7 days   Lab Units 09/03/21  0006 09/02/21 2050 09/02/21 2050   SODIUM mmol/L 148*   < > 147*   POTASSIUM mmol/L 4.2   < > 3.8   CHLORIDE mmol/L 111*   < > 108*   CO2 mmol/L 26.5   < > 26.2   BUN mg/dL 60*   < > 62*   CREATININE mg/dL 1.65*   < > 1.77*   GLUCOSE mg/dL 119*   < > 116*   CALCIUM mg/dL 9.3   < > 9.7   ALT (SGPT) U/L 21   < > 21   AST (SGOT) U/L 22   < > 22   LACTATE mmol/L 1.5   < >  2.2*   PROCALCITONIN ng/mL  --   --  0.22    < > = values in this interval not displayed.     Estimated Creatinine Clearance: 39 mL/min (A) (by C-G formula based on SCr of 1.65 mg/dL (H)).  Brief Urine Lab Results  (Last result in the past 365 days)      Color   Clarity   Blood   Leuk Est   Nitrite   Protein   CREAT   Urine HCG        09/02/21 2115 Dark Yellow Cloudy Small (1+) Trace Positive 100 mg/dL (2+)             Imaging Results (Last 24 Hours)     Procedure Component Value Units Date/Time    XR Chest 1 View [[id number]] Collected: 09/02/21 2156     Updated: 09/02/21 2156    Narrative:        Patient: YOUNG SNYDER  Time Out: 21:56  Exam(s): FILM CXR 1 VIEW     EXAM:    XR Chest, 1 View    CLINICAL HISTORY:     Reason for exam: AMS, known covid.    TECHNIQUE:    Frontal view of the chest.    COMPARISON:    8 21 2021.    FINDINGS:    Lungs:  Minimal areas of patchy airspace disease bilaterally per    Pleural space:  Unremarkable.  No pneumothorax.    Heart:  Cardiomediastinal silhouette unremarkable.    Mediastinum:  See above.    Bones joints:  Osteopenia.  Healed fracture of the mid left clavicle.    Other findings:  Mild hypoaeration.    IMPRESSION:       1.  Bilateral patchy airspace disease raising concern for possible early   Covid-19 pneumonia.  2.  Osteopenia.      Impression:          Electronically signed by Jd Grande MD on 09-02-21 at 2156            Assessment/Plan   Assessment & Plan     Active Hospital Problems    Diagnosis  POA   • **Pneumonia due to COVID-19 virus [U07.1, J12.82]  Yes   • Metabolic encephalopathy [G93.41]  Yes   • Dehydration [E86.0]  Yes   • Hypernatremia [E87.0]  Yes   • ANA (acute kidney injury) (CMS/HCC) [N17.9]  Yes   • Stage 3a chronic kidney disease (CMS/HCC) [N18.31]  Yes   • Positive D-dimer [R79.89]  Yes   • Acute cystitis [N30.00]  Yes   • Debility [R53.81]  Yes     76-year-old male presents by EMS found naked in bed at home with metabolic encephalopathy,  dehydration, hypernatremia, COVID-19 pneumonia with acute kidney injury and acute cystitis.    COVID-19 pneumonia:  Continue dexamethasone.  Consult placed for pharmacy for remdesivir however they report we are unable to give due to exclusion criteria of infection for greater than 10 days.  Supplemental oxygen as needed.  Supportive care and symptom treatment.  Inflammatory markers are significantly elevated.  PPI for GI prophylaxis.  Lovenox for DVT prophylaxis.    Dehydration/acute kidney injury on stage IIIa chronic kidney disease:  Plan for IV fluid.  Monitor oral intake and adjust as needed.  Trend renal function and electrolytes.    Metabolic encephalopathy:  Likely related to acute infection and renal dysfunction.  Supportive care and frequent reorientation.    Possible acute cystitis:  Urinalysis positive for nitrite and leukocyte Estrace.  Microscopic is pending.  Recent culture positive for Enterococcus susceptible to ampicillin.  Start ampicillin for now and check culture.    Hypernatremia:  Encourage oral intake.  Half-normal saline.    Positive D-dimer:  Likely elevated due to COVID-19.  Check lower extremity duplex.  Avoid CT angiogram for now due to renal dysfunction and trend.    Debility: PT OT.    DVT prophylaxis:   Lovenox      CODE STATUS:    Code Status and Medical Interventions:   Ordered at: 09/03/21 0045     Code Status:    CPR     Medical Interventions (Level of Support Prior to Arrest):    Full       Admission Status:  I believe this patient meets INPATIENT status due to COVID-19 pneumonia with hypoxia and metabolic encephalopathy.  I feel patient’s risk for adverse outcomes and need for care warrant INPATIENT evaluation and I predict the patient’s care encounter to likely last beyond 2 midnights.      Benjamin Loera MD  09/03/21

## 2021-09-03 NOTE — PLAN OF CARE
Goal Outcome Evaluation:  Plan of Care Reviewed With: patient        Progress: improving  Outcome Summary: Pt seen by OT this date for evaluation. Upon arrival pt lying supine in bed, no c/o pain, A&O x2 with verbal cues. OT assisted aide in bathing task as pt required Mod A x2 for rolling in bed and Max A for bathing task. Pt performed supine>sit transition with Mod A and verbal cues to sit EOB. Pt performed LB dressing task with Min A. Pt performed sit>stand transition with HHA x1 and Min-Mod A to transfer from bed>chair. Pt left sitting up in chair, needs in reach, alarm on. Pt to benefit from skilled OT services to address goals and deficits. OT rec SNF at D/C. OT wore mask, gloves, glasses, hand hygiene performed, appropriate ppe worn.

## 2021-09-03 NOTE — ED PROVIDER NOTES
EMERGENCY DEPARTMENT ENCOUNTER    Room Number:  08/08  Date seen:  9/2/2021  Time seen: 20:17 EDT  PCP: Sree Thomas MD  Historian: patient, EMS    HPI:  Chief complaint:AMS, covid 19  A complete HPI/ROS/PMH/PSH/SH/FH are unobtainable due to: AMS  Context:Jake Kan is a 76 y.o. male who presents to the ED with c/o AMS and severe weakness and lack of appetite due to recent diagnosis of covid 19.  He is oriented to person and only tells me he is weak.     Patient was placed in face mask in first look. Patient was wearing facemask when I entered the room and throughout our encounter. I wore full protective equipment throughout this patient encounter including a N 95 face mask, eye shield and gloves. Hand hygiene/washing of hands was performed before donning protective equipment and after removal when leaving the room.    MEDICAL RECORD REVIEW    ALLERGIES  Patient has no known allergies.    PAST MEDICAL HISTORY  Active Ambulatory Problems     Diagnosis Date Noted   • Sciatica of right side 11/29/2017   • Phlegm in throat 11/29/2017   • Heartburn 11/29/2017   • Abnormal EKG 08/29/2012   • Fatigue 08/29/2012   • Hyperlipidemia 03/16/2011   • Other symptoms involving digestive system 03/29/2011   • Paresthesias 03/19/2012   • Pruritus 01/04/2012   • Rosacea 07/11/2012   • Seborrheic dermatitis 03/19/2012     Resolved Ambulatory Problems     Diagnosis Date Noted   • Abscess, umbilical 08/07/2019     No Additional Past Medical History       PAST SURGICAL HISTORY  Past Surgical History:   Procedure Laterality Date   • HERNIA REPAIR  1988   • UMBILICAL HERNIA REPAIR N/A 9/23/2019    Procedure: excision of umbilical hernia mesh and umbilical hernia repair;  Surgeon: Luan Dowell MD;  Location: [facility] MAIN OR;  Service: General   • WRIST FRACTURE SURGERY Left 1998       FAMILY HISTORY  Family History   Problem Relation Age of Onset   • COPD Sister        SOCIAL HISTORY  Social History     Socioeconomic History   •  Marital status: Single     Spouse name: Not on file   • Number of children: Not on file   • Years of education: Not on file   • Highest education level: Not on file   Tobacco Use   • Smoking status: Never Smoker   • Smokeless tobacco: Never Used   Substance and Sexual Activity   • Alcohol use: No   • Drug use: No   • Sexual activity: Defer       REVIEW OF SYSTEMS  Review of Systems   Unable to perform ROS: Mental status change       PHYSICAL EXAM    ED Triage Vitals [09/02/21 1952]   Temp Heart Rate Resp BP SpO2   99.2 °F (37.3 °C) 81 24 118/74 95 %      Temp src Heart Rate Source Patient Position BP Location FiO2 (%)   Oral Monitor -- -- --     Physical Exam    I have reviewed the triage vital signs and nursing notes.      GENERAL: not distressed, appears ill and dishelved  HENT: nares patent, mm dry  EYES: no scleral icterus  NECK: no ROM limitations  CV: regular rhythm, regular rate, no murmur, no rubs, no gallups  RESPIRATORY: normal effort, decreased in bases.  Able to speak in full sentences  ABDOMEN: soft  : deferred  MUSCULOSKELETAL: no deformity  NEURO: alert, moves all extremities, follows commands  SKIN: warm, dry    LAB RESULTS  Recent Results (from the past 24 hour(s))   Comprehensive Metabolic Panel    Collection Time: 09/02/21  8:50 PM    Specimen: Blood   Result Value Ref Range    Glucose 116 (H) 65 - 99 mg/dL    BUN 62 (H) 8 - 23 mg/dL    Creatinine 1.77 (H) 0.76 - 1.27 mg/dL    Sodium 147 (H) 136 - 145 mmol/L    Potassium 3.8 3.5 - 5.2 mmol/L    Chloride 108 (H) 98 - 107 mmol/L    CO2 26.2 22.0 - 29.0 mmol/L    Calcium 9.7 8.6 - 10.5 mg/dL    Total Protein 7.8 6.0 - 8.5 g/dL    Albumin 3.80 3.50 - 5.20 g/dL    ALT (SGPT) 21 1 - 41 U/L    AST (SGOT) 22 1 - 40 U/L    Alkaline Phosphatase 69 39 - 117 U/L    Total Bilirubin 2.3 (H) 0.0 - 1.2 mg/dL    eGFR Non African Amer 37 (L) >60 mL/min/1.73    Globulin 4.0 gm/dL    A/G Ratio 1.0 g/dL    BUN/Creatinine Ratio 35.0 (H) 7.0 - 25.0    Anion Gap 12.8  5.0 - 15.0 mmol/L   Protime-INR    Collection Time: 09/02/21  8:50 PM    Specimen: Blood   Result Value Ref Range    Protime 16.2 (H) 11.7 - 14.2 Seconds    INR 1.32 (H) 0.90 - 1.10   Lipase    Collection Time: 09/02/21  8:50 PM    Specimen: Blood   Result Value Ref Range    Lipase 21 13 - 60 U/L   Procalcitonin    Collection Time: 09/02/21  8:50 PM    Specimen: Blood   Result Value Ref Range    Procalcitonin 0.22 0.00 - 0.25 ng/mL   CBC Auto Differential    Collection Time: 09/02/21  8:50 PM    Specimen: Blood   Result Value Ref Range    WBC 7.32 3.40 - 10.80 10*3/mm3    RBC 4.39 4.14 - 5.80 10*6/mm3    Hemoglobin 14.0 13.0 - 17.7 g/dL    Hematocrit 40.6 37.5 - 51.0 %    MCV 92.5 79.0 - 97.0 fL    MCH 31.9 26.6 - 33.0 pg    MCHC 34.5 31.5 - 35.7 g/dL    RDW 13.4 12.3 - 15.4 %    RDW-SD 45.8 37.0 - 54.0 fl    MPV 10.4 6.0 - 12.0 fL    Platelets 343 140 - 450 10*3/mm3   Lactic Acid, Plasma    Collection Time: 09/02/21  8:50 PM    Specimen: Blood   Result Value Ref Range    Lactate 2.2 (C) 0.5 - 2.0 mmol/L   Manual Differential    Collection Time: 09/02/21  8:50 PM    Specimen: Blood   Result Value Ref Range    Neutrophil % 91.9 (H) 42.7 - 76.0 %    Lymphocyte % 3.0 (L) 19.6 - 45.3 %    Monocyte % 5.1 5.0 - 12.0 %    Neutrophils Absolute 6.73 1.70 - 7.00 10*3/mm3    Lymphocytes Absolute 0.22 (L) 0.70 - 3.10 10*3/mm3    Monocytes Absolute 0.37 0.10 - 0.90 10*3/mm3    Anisocytosis Mod/2+ None Seen    [place] Cells Mod/2+ None Seen    Poikilocytes Large/3+ None Seen    WBC Morphology Normal Normal    Platelet Morphology Normal Normal   Urinalysis With Culture If Indicated - Urine, Catheter    Collection Time: 09/02/21  9:15 PM    Specimen: Urine, Catheter   Result Value Ref Range    Color, UA Dark Yellow (A) Yellow, Straw    Appearance, UA Cloudy (A) Clear    pH, UA <=5.0 5.0 - 8.0    Specific Gravity, UA 1.025 1.005 - 1.030    Glucose, UA Negative Negative    Ketones, UA Trace (A) Negative    Bilirubin, UA Moderate (2+)  (A) Negative    Blood, UA Small (1+) (A) Negative    Protein,  mg/dL (2+) (A) Negative    Leuk Esterase, UA Trace (A) Negative    Nitrite, UA Positive (A) Negative    Urobilinogen, UA 2.0 E.U./dL (A) 0.2 - 1.0 E.U./dL   Urinalysis, Microscopic Only - Urine, Catheter    Collection Time: 09/02/21  9:15 PM    Specimen: Urine, Catheter   Result Value Ref Range    RBC, UA 0-2 None Seen, 0-2 /HPF    WBC, UA 0-2 None Seen, 0-2 /HPF    Bacteria, UA 2+ (A) None Seen /HPF    Squamous Epithelial Cells, UA 0-2 None Seen, 0-2 /HPF    Transitional Epithelial Cells, UA 7-12 (A) 0 - 2 /HPF    Hyaline Casts, UA 7-12 None Seen /LPF    Granular Casts, UA 3-6 None Seen /LPF    Amorphous Crystals, UA Moderate/2+ None Seen /HPF    Methodology Manual Light Microscopy          RADIOLOGY RESULTS  XR Chest 1 View   Final Result         Electronically signed by Jd Grande MD on 09-02-21 at 2156            PROGRESS, DATA ANALYSIS, CONSULTS AND MEDICAL DECISION MAKING  All labs have been independently reviewed by me.  All radiology studies have been reviewed by me and discussed with radiologist dictating the report.  EKG's independently viewed and interpreted by me unless stated otherwise. Discussion below represents my analysis of pertinent findings related to patient's condition, differential diagnosis, treatment plan and final disposition.     ED Course as of Sep 02 2244   Thu Sep 02, 2021   2147 Nitrite, UA(!): Positive [EW]   2147 Rocephin ordered for UTI.    I viewed CXR on PACS.  My interpretation is bilateral pulmonary opacities consistent with covid pneumonia.     [EW]   2201 Creatinine(!): 1.77 [EW]   2201 Sodium(!): 147 [EW]   2228 Discussed admission with Dr. Loera.  Agrees to admit to [facility]    [EW]      ED Course User Index  [EW] Christina Paige, APRN     DDX: Differential diagnosis for altered mental status includes but is not limited to:  - vital sign abnormalities such as HTN encephalopathy, hypotension, hypoxemia,  "hypercarbia, heat stroke  - toxic/metabolic pathology such as hypoglycemia, DKA, hypo/hyper-natremia, thyroid storm, myxedema coma, medication side effect (either intentional or accidental)  - infectious etiology  - intracranial pathology such as stroke, seizure, intracranial mass, intracranial hemorrhage  - psychiatric pathology    MDM: This patient has a significant Covid pneumonia noted on imaging.  He also has an acute urinary tract infection with acute any injury.  I have given IV fluids as well as Rocephin for the UTI.  He will be admitted to the hospital.  He is not hypoxic on 2 L nasal cannula but I did give a dose of dexamethasone.     Reviewed pt's history and workup with Dr. Murphy.  After a bedside evaluation, Dr. Murphy agrees with the plan of care.    Based on the patient's lab findings and presenting symptoms, the doctor and I feel it is appropriate to admit the patient for further management, evaluation, and treatment.  I have discussed this with the admitting team.  I have also discussed this with the patient/family.  They are in agreement with admission.          Disposition vitals:  /74   Pulse 81   Temp 99.6 °F (37.6 °C) (Oral)   Resp 24   Ht 180.3 cm (71\")   Wt 74.8 kg (165 lb)   SpO2 95%   BMI 23.01 kg/m²       DIAGNOSIS  Final diagnoses:   Pneumonia due to COVID-19 virus   Acute kidney injury (ANA) with acute tubular necrosis (ATN) (CMS/[facility])   Acute UTI   Altered mental status, unspecified altered mental status type       Admission     Christina Paige, APRN  09/02/21 2245    "

## 2021-09-03 NOTE — THERAPY EVALUATION
Patient Name: Jake Kan  : 1945    MRN: [id number]                              Today's Date: 9/3/2021       Admit Date: 2021    Visit Dx:     ICD-10-CM ICD-9-CM   1. Pneumonia due to COVID-19 virus  U07.1 480.8    J12.82 079.89   2. Acute kidney injury (ANA) with acute tubular necrosis (ATN) (CMS/HCC)  N17.0 584.5   3. Acute UTI  N39.0 599.0   4. Altered mental status, unspecified altered mental status type  R41.82 780.97     Patient Active Problem List   Diagnosis   • Sciatica of right side   • Phlegm in throat   • Heartburn   • Abnormal EKG   • Debility   • Hyperlipidemia   • Other symptoms involving digestive system   • Paresthesias   • Pruritus   • Rosacea   • Seborrheic dermatitis   • Pneumonia due to COVID-19 virus   • Metabolic encephalopathy   • Dehydration   • Hypernatremia   • ANA (acute kidney injury) (CMS/HCC)   • Stage 3a chronic kidney disease (CMS/HCC)   • Positive D-dimer   • Acute cystitis   • Hypoxia     History reviewed. No pertinent past medical history.  Past Surgical History:   Procedure Laterality Date   • HERNIA REPAIR     • UMBILICAL HERNIA REPAIR N/A 2019    Procedure: excision of umbilical hernia mesh and umbilical hernia repair;  Surgeon: Luan Dowell MD;  Location: [facility] OR;  Service: General   • WRIST FRACTURE SURGERY Left      General Information     Row Name 21 1219          Physical Therapy Time and Intention    Document Type  evaluation  -DJ     Mode of Treatment  individual therapy;physical therapy  -DJ     Row Name 21 1219          General Information    Patient Profile Reviewed  yes  -DJ     Prior Level of Function  independent:;all household mobility;min assist:  -DJ     Existing Precautions/Restrictions  fall  -DJ     Barriers to Rehab  medically complex;previous functional deficit;cognitive status  -DJ     Row Name 21 1219          Living Environment    Lives With  significant other  -DJ     Row Name 21 1214           Home Main Entrance    Number of Stairs, Main Entrance  one  -DJ     Row Name 09/03/21 1219          Stairs Within Home, Primary    Number of Stairs, Within Home, Primary  none  -DJ     Row Name 09/03/21 1219          Cognition    Orientation Status (Cognition)  unable/difficult to assess confused at times, agreeable to PT  -DJ     Row Name 09/03/21 1219          Safety Issues, Functional Mobility    Safety Issues Affecting Function (Mobility)  ability to follow commands;awareness of need for assistance;judgment;safety precaution awareness  -DJ     Impairments Affecting Function (Mobility)  endurance/activity tolerance;strength  -DJ     Comment, Safety Issues/Impairments (Mobility)  gt belt, nonskid socks  -DJ       User Key  (r) = Recorded By, (t) = Taken By, (c) = Cosigned By    Initials Name Provider Type    Dinora Laura, PT Physical Therapist        Mobility     Row Name 09/03/21 1226          Bed Mobility    Bed Mobility  sit-supine  -DJ     Sit-Supine Pauline (Bed Mobility)  minimum assist (75% patient effort);verbal cues  -DJ     Assistive Device (Bed Mobility)  bed rails;head of bed elevated  -DJ     Comment (Bed Mobility)  Pt UIC, able to reposition self in bed with vc, moves very slowly  -DJ     Row Name 09/03/21 1226          Transfers    Comment (Transfers)  sit/stand from recliner  -DJ     Row Name 09/03/21 1226          Bed-Chair Transfer    Bed-Chair Pauline (Transfers)  not tested  -DJ     Row Name 09/03/21 1226          Sit-Stand Transfer    Sit-Stand Pauline (Transfers)  minimum assist (75% patient effort);verbal cues  -DJ     Assistive Device (Sit-Stand Transfers)  walker, front-wheeled  -DJ     Row Name 09/03/21 1226          Gait/Stairs (Locomotion)    Pauline Level (Gait)  contact guard;verbal cues  -DJ     Assistive Device (Gait)  walker, front-wheeled  -DJ     Distance in Feet (Gait)  20'  -DJ     Deviations/Abnormal Patterns (Gait)  base of support, narrow;elijah  decreased;festinating/shuffling;gait speed decreased;stride length decreased  -DJ     [place] Level (Stairs)  not tested  -DJ     Comment (Gait/Stairs)  Pt amb 20' in room with r wx and CGA very slowly and with shuffled steps. Balance is fair with r wx, posture is flexed, endurance is poor and limits further amb distance  -DJ       User Key  (r) = Recorded By, (t) = Taken By, (c) = Cosigned By    Initials Name Provider Type    Dinora Laura, SANDRA Physical Therapist        Obj/Interventions     Row Name 09/03/21 1229          Range of Motion Comprehensive    Comment, General Range of Motion  B sh 50% nl  -DJ     Row Name 09/03/21 1229          Strength Comprehensive (MMT)    Comment, General Manual Muscle Testing (MMT) Assessment  grossly 4-/5, generalized weakness  -DJ     Row Name 09/03/21 1229          Motor Skills    Motor Skills  functional endurance  -DJ     Functional Endurance  fatigues with mobility  -DJ     Row Name 09/03/21 1229          Balance    Balance Assessment  sitting static balance;standing static balance  -DJ     Static Sitting Balance  WFL;unsupported;sitting in chair  -DJ     Static Standing Balance  mild impairment;supported;standing  -DJ     Balance Interventions  sitting;standing;sit to stand;supported;weight shifting activity  -DJ     Row Name 09/03/21 1229          Sensory Assessment (Somatosensory)    Sensory Assessment (Somatosensory)  not tested  -DJ       User Key  (r) = Recorded By, (t) = Taken By, (c) = Cosigned By    Initials Name Provider Type    Dinora Laura, PT Physical Therapist        Goals/Plan     Row Name 09/03/21 1241          Bed Mobility Goal 1 (PT)    Activity/Assistive Device (Bed Mobility Goal 1, PT)  sit to supine;supine to sit  -DJ     [place] Level/Cues Needed (Bed Mobility Goal 1, PT)  standby assist  -DJ     Time Frame (Bed Mobility Goal 1, PT)  2 weeks  -DJ     Row Name 09/03/21 1241          Transfer Goal 1 (PT)    Activity/Assistive Device  (Transfer Goal 1, PT)  sit-to-stand/stand-to-sit  -DJ     [place] Level/Cues Needed (Transfer Goal 1, PT)  standby assist  -DJ     Time Frame (Transfer Goal 1, PT)  2 weeks  -DJ     Row Name 09/03/21 1241          Gait Training Goal 1 (PT)    Activity/Assistive Device (Gait Training Goal 1, PT)  gait (walking locomotion);assistive device use;improve balance and speed;increase endurance/gait distance;increase energy conservation;walker, rolling  -DJ     [place] Level (Gait Training Goal 1, PT)  standby assist;verbal cues required  -DJ     Distance (Gait Training Goal 1, PT)  150'  -DJ     Time Frame (Gait Training Goal 1, PT)  2 weeks  -DJ       User Key  (r) = Recorded By, (t) = Taken By, (c) = Cosigned By    Initials Name Provider Type    Dinora Laura, PT Physical Therapist        Clinical Impression     Row Name 09/03/21 1232          Pain    Additional Documentation  Pain Scale: Numbers Pre/Post-Treatment (Group)  -DJ     Row Name 09/03/21 1232          Pain Scale: Numbers Pre/Post-Treatment    Pretreatment Pain Rating  0/10 - no pain  -DJ     Row Name 09/03/21 1232          Plan of Care Review    Plan of Care Reviewed With  patient  -DJ     Outcome Summary  75yo white male admitted 9/2/21 after well check from police found pt naked and surrounded by moldy/dry food. Pt found to have UTI and Covid. PMH includes metabolic brain disorder, ANA, CKD 3a, bladder inflammatio, heartburn, high cholesterol. PLOF: Pt lives at home with girlfriend, reports he has never used AD. He is primarily limited at this time by generalized weakness and decreased activity tolerance that limits his functional mobility. Today, he was UIC soaked in urine, agreeable to PT. He req min A for sit/stand from recliner and was able to amb 20' with r wx and min A and vc for posture. Pt req min A for sit/supine and to reposition self in bed. Gt is very slow and unsteady. O2 sates 93% after amb without O2 He would benefit from skilled  PT for there x, gt/transfer training, bed mobility, balance, and endurance as tolerated.  -DJ     Row Name 09/03/21 1232          Therapy Assessment/Plan (PT)    Patient/Family Therapy Goals Statement (PT)  return home  -DJ     Rehab Potential (PT)  good, to achieve stated therapy goals  -DJ     Criteria for Skilled Interventions Met (PT)  yes;meets criteria;skilled treatment is necessary  -DJ     Row Name 09/03/21 1232          Vital Signs    O2 Delivery Pre Treatment  room air  -DJ     O2 Delivery Intra Treatment  room air  -DJ     Post SpO2 (%)  93  -DJ     O2 Delivery Post Treatment  room air  -DJ     Pre Patient Position  Sitting  -DJ     Intra Patient Position  Standing  -DJ     Post Patient Position  Supine  -DJ     Row Name 09/03/21 1232          Positioning and Restraints    Pre-Treatment Position  sitting in chair/recliner  -DJ     Post Treatment Position  bed  -DJ     In Bed  notified nsg;supine;call light within reach;encouraged to call for assist;exit alarm on  -DJ       User Key  (r) = Recorded By, (t) = Taken By, (c) = Cosigned By    Initials Name Provider Type    Dinora Laura, PT Physical Therapist        Outcome Measures     Row Name 09/03/21 1241          How much help from another person do you currently need...    Turning from your back to your side while in flat bed without using bedrails?  3  -DJ     Moving from lying on back to sitting on the side of a flat bed without bedrails?  3  -DJ     Moving to and from a bed to a chair (including a wheelchair)?  3  -DJ     Standing up from a chair using your arms (e.g., wheelchair, bedside chair)?  3  -DJ     Climbing 3-5 steps with a railing?  2  -DJ     To walk in hospital room?  3  -DJ     AM-PAC 6 Clicks Score (PT)  17  -DJ     Row Name 09/03/21 1241          Functional Assessment    Outcome Measure Options  AM-PAC 6 Clicks Basic Mobility (PT)  -DJ       User Key  (r) = Recorded By, (t) = Taken By, (c) = Cosigned By    Initials Name Provider Type     Dinora Laura, PT Physical Therapist                       Physical Therapy Education                 Title: PT OT SLP Therapies (In Progress)     Topic: Physical Therapy (In Progress)     Point: Mobility training (In Progress)     Learning Progress Summary           Patient Acceptance, E, NR by SOFI at 9/3/2021 1242                   Point: Home exercise program (Not Started)     Learner Progress:  Not documented in this visit.          Point: Body mechanics (In Progress)     Learning Progress Summary           Patient Acceptance, E, NR by SOFI at 9/3/2021 1242                   Point: Precautions (In Progress)     Learning Progress Summary           Patient Acceptance, E, NR by SOFI at 9/3/2021 1242                               User Key     Initials Effective Dates Name Provider Type Discipline    SOFI 10/25/19 -  Dinora Torres PT Physical Therapist PT              PT Recommendation and Plan  Planned Therapy Interventions (PT): balance training, bed mobility training, gait training, home exercise program, strengthening, postural re-education, patient/family education, transfer training  Plan of Care Reviewed With: patient  Outcome Summary: 77yo white male admitted 9/2/21 after well check from police found pt naked and surrounded by moldy/dry food. Pt found to have UTI and Covid. PMH includes metabolic brain disorder, ANA, CKD 3a, bladder inflammatio, heartburn, high cholesterol. PLOF: Pt lives at home with girlfriend, reports he has never used AD. He is primarily limited at this time by generalized weakness and decreased activity tolerance that limits his functional mobility. Today, he was UIC soaked in urine, agreeable to PT. He req min A for sit/stand from recliner and was able to amb 20' with r wx and min A and vc for posture. Pt req min A for sit/supine and to reposition self in bed. Gt is very slow and unsteady. O2 sates 93% after amb without O2 He would benefit from skilled PT for there x, gt/transfer  training, bed mobility, balance, and endurance as tolerated.     Time Calculation:   PT Charges     Row Name 09/03/21 1246             Time Calculation    Start Time  1129  -DJ      Stop Time  1159  -DJ      Time Calculation (min)  30 min  -DJ      PT Non-Billable Time (min)  15 min  -DJ      PT Received On  09/03/21  -DJ      PT - Next Appointment  09/06/21  -DJ      PT Goal Re-Cert Due Date  09/17/21  -DJ        User Key  (r) = Recorded By, (t) = Taken By, (c) = Cosigned By    Initials Name Provider Type    Dinora Laura, SANDRA Physical Therapist        Therapy Charges for Today     Code Description Service Date Service Provider Modifiers Qty    48164806605 HC PT EVAL MOD COMPLEXITY 2 9/3/2021 Dinora Torres, PT GP 1    06467097600 HC PT THER PROC EA 15 MIN 9/3/2021 Dinora Torres, PT GP 2          PT G-Codes  Outcome Measure Options: AM-PAC 6 Clicks Basic Mobility (PT)  AM-PAC 6 Clicks Score (PT): 17    Dinora Torres PT  9/3/2021

## 2021-09-04 ENCOUNTER — APPOINTMENT (OUTPATIENT)
Dept: NUCLEAR MEDICINE | Facility: HOSPITAL | Age: 76
End: 2021-09-04

## 2021-09-04 LAB
ALBUMIN SERPL-MCNC: 2.5 G/DL (ref 3.5–5.2)
ALBUMIN/GLOB SERPL: 0.8 G/DL
ALP SERPL-CCNC: 52 U/L (ref 39–117)
ALT SERPL W P-5'-P-CCNC: 18 U/L (ref 1–41)
ANION GAP SERPL CALCULATED.3IONS-SCNC: 9.7 MMOL/L (ref 5–15)
AST SERPL-CCNC: 17 U/L (ref 1–40)
BASOPHILS # BLD AUTO: 0.01 10*3/MM3 (ref 0–0.2)
BASOPHILS NFR BLD AUTO: 0.1 % (ref 0–1.5)
BILIRUB SERPL-MCNC: 1.1 MG/DL (ref 0–1.2)
BUN SERPL-MCNC: 42 MG/DL (ref 8–23)
BUN/CREAT SERPL: 40.4 (ref 7–25)
CALCIUM SPEC-SCNC: 8.3 MG/DL (ref 8.6–10.5)
CHLORIDE SERPL-SCNC: 109 MMOL/L (ref 98–107)
CO2 SERPL-SCNC: 20.3 MMOL/L (ref 22–29)
CREAT SERPL-MCNC: 1.04 MG/DL (ref 0.76–1.27)
CRP SERPL-MCNC: 8.7 MG/DL (ref 0–0.5)
DEPRECATED RDW RBC AUTO: 41.3 FL (ref 37–54)
EOSINOPHIL # BLD AUTO: 0.01 10*3/MM3 (ref 0–0.4)
EOSINOPHIL NFR BLD AUTO: 0.1 % (ref 0.3–6.2)
ERYTHROCYTE [DISTWIDTH] IN BLOOD BY AUTOMATED COUNT: 12.7 % (ref 12.3–15.4)
FERRITIN SERPL-MCNC: 1928 NG/ML (ref 30–400)
GFR SERPL CREATININE-BSD FRML MDRD: 69 ML/MIN/1.73
GLOBULIN UR ELPH-MCNC: 3.2 GM/DL
GLUCOSE SERPL-MCNC: 115 MG/DL (ref 65–99)
HCT VFR BLD AUTO: 32.5 % (ref 37.5–51)
HGB BLD-MCNC: 11.5 G/DL (ref 13–17.7)
LYMPHOCYTES # BLD AUTO: 0.65 10*3/MM3 (ref 0.7–3.1)
LYMPHOCYTES NFR BLD AUTO: 9.5 % (ref 19.6–45.3)
MAGNESIUM SERPL-MCNC: 2.8 MG/DL (ref 1.6–2.4)
MCH RBC QN AUTO: 31.9 PG (ref 26.6–33)
MCHC RBC AUTO-ENTMCNC: 35.4 G/DL (ref 31.5–35.7)
MCV RBC AUTO: 90 FL (ref 79–97)
MONOCYTES # BLD AUTO: 0.39 10*3/MM3 (ref 0.1–0.9)
MONOCYTES NFR BLD AUTO: 5.7 % (ref 5–12)
NEUTROPHILS NFR BLD AUTO: 5.71 10*3/MM3 (ref 1.7–7)
NEUTROPHILS NFR BLD AUTO: 84 % (ref 42.7–76)
PLATELET # BLD AUTO: 297 10*3/MM3 (ref 140–450)
PMV BLD AUTO: 10.8 FL (ref 6–12)
POTASSIUM SERPL-SCNC: 3.8 MMOL/L (ref 3.5–5.2)
PROT SERPL-MCNC: 5.7 G/DL (ref 6–8.5)
RBC # BLD AUTO: 3.61 10*6/MM3 (ref 4.14–5.8)
SODIUM SERPL-SCNC: 139 MMOL/L (ref 136–145)
WBC # BLD AUTO: 6.81 10*3/MM3 (ref 3.4–10.8)

## 2021-09-04 PROCEDURE — 36415 COLL VENOUS BLD VENIPUNCTURE: CPT | Performed by: INTERNAL MEDICINE

## 2021-09-04 PROCEDURE — 82728 ASSAY OF FERRITIN: CPT | Performed by: INTERNAL MEDICINE

## 2021-09-04 PROCEDURE — 25010000002 AMPICILLIN PER 500 MG: Performed by: INTERNAL MEDICINE

## 2021-09-04 PROCEDURE — 83735 ASSAY OF MAGNESIUM: CPT | Performed by: INTERNAL MEDICINE

## 2021-09-04 PROCEDURE — 63710000001 DEXAMETHASONE PER 0.25 MG: Performed by: INTERNAL MEDICINE

## 2021-09-04 PROCEDURE — A9540 TC99M MAA: HCPCS | Performed by: INTERNAL MEDICINE

## 2021-09-04 PROCEDURE — 25010000002 ENOXAPARIN PER 10 MG: Performed by: INTERNAL MEDICINE

## 2021-09-04 PROCEDURE — 0 TECHNETIUM ALBUMIN AGGREGATED: Performed by: INTERNAL MEDICINE

## 2021-09-04 PROCEDURE — 85025 COMPLETE CBC W/AUTO DIFF WBC: CPT | Performed by: INTERNAL MEDICINE

## 2021-09-04 PROCEDURE — 78580 LUNG PERFUSION IMAGING: CPT

## 2021-09-04 PROCEDURE — 86140 C-REACTIVE PROTEIN: CPT | Performed by: INTERNAL MEDICINE

## 2021-09-04 PROCEDURE — 80053 COMPREHEN METABOLIC PANEL: CPT | Performed by: INTERNAL MEDICINE

## 2021-09-04 RX ORDER — SODIUM CHLORIDE, SODIUM LACTATE, POTASSIUM CHLORIDE, CALCIUM CHLORIDE 600; 310; 30; 20 MG/100ML; MG/100ML; MG/100ML; MG/100ML
75 INJECTION, SOLUTION INTRAVENOUS CONTINUOUS
Status: DISCONTINUED | OUTPATIENT
Start: 2021-09-04 | End: 2021-09-06

## 2021-09-04 RX ADMIN — PANTOPRAZOLE SODIUM 40 MG: 40 TABLET, DELAYED RELEASE ORAL at 06:21

## 2021-09-04 RX ADMIN — AMPICILLIN SODIUM 1 G: 1 INJECTION, POWDER, FOR SOLUTION INTRAMUSCULAR; INTRAVENOUS at 12:18

## 2021-09-04 RX ADMIN — SODIUM CHLORIDE 125 ML/HR: 4.5 INJECTION, SOLUTION INTRAVENOUS at 06:20

## 2021-09-04 RX ADMIN — SODIUM CHLORIDE, PRESERVATIVE FREE 3 ML: 5 INJECTION INTRAVENOUS at 19:54

## 2021-09-04 RX ADMIN — SODIUM CHLORIDE, POTASSIUM CHLORIDE, SODIUM LACTATE AND CALCIUM CHLORIDE 75 ML/HR: 600; 310; 30; 20 INJECTION, SOLUTION INTRAVENOUS at 18:21

## 2021-09-04 RX ADMIN — KIT FOR THE PREPARATION OF TECHNETIUM TC 99M ALBUMIN AGGREGATED 1 DOSE: 2.5 INJECTION, POWDER, FOR SOLUTION INTRAVENOUS at 08:50

## 2021-09-04 RX ADMIN — AMPICILLIN SODIUM 1 G: 1 INJECTION, POWDER, FOR SOLUTION INTRAMUSCULAR; INTRAVENOUS at 06:21

## 2021-09-04 RX ADMIN — DEXAMETHASONE 6 MG: 4 TABLET ORAL at 10:02

## 2021-09-04 RX ADMIN — ENOXAPARIN SODIUM 40 MG: 40 INJECTION SUBCUTANEOUS at 10:02

## 2021-09-04 RX ADMIN — AMPICILLIN SODIUM 1 G: 1 INJECTION, POWDER, FOR SOLUTION INTRAMUSCULAR; INTRAVENOUS at 02:16

## 2021-09-04 RX ADMIN — AMPICILLIN SODIUM 1 G: 1 INJECTION, POWDER, FOR SOLUTION INTRAMUSCULAR; INTRAVENOUS at 18:21

## 2021-09-04 NOTE — PLAN OF CARE
Goal Outcome Evaluation:  Plan of Care Reviewed With: patient        Progress: no change  Outcome Summary: Slept fine, jay as low as 38 but not sustaining when asleep, alert but pleasantly confused, IVF and IV antibx, frequently trying to get out of bed to pee, reminded always to stay in bed and call so he wont fall, post void bladder scan done, on RA, VSS, will monitor closely, falls precaution enforced

## 2021-09-04 NOTE — PROGRESS NOTES
Name: Jake Kan ADMIT: 2021   : 1945  PCP: Sree Thomas MD    MRN: [id number] LOS: 1 days   AGE/SEX: 76 y.o. male  ROOM: [facility]     Subjective   Subjective   CC: confusion  No acute events. Patient has no new complaints. Dyspnea is about the same. Cough is non-productive. Taking PO. No CP/f/c/n/v/d.    Objective   Objective   Vital Signs  Temp:  [97.3 °F (36.3 °C)-97.7 °F (36.5 °C)] 97.3 °F (36.3 °C)  Heart Rate:  [49-70] 60  Resp:  [16] 16  BP: (104-149)/(50-77) 137/70  SpO2:  [91 %-97 %] 91 %  on  Flow (L/min):  [4] 4;   Device (Oxygen Therapy): room air  Body mass index is 20.24 kg/m².  Physical Exam  Vitals and nursing note reviewed.   Constitutional:       General: He is not in acute distress.     Appearance: He is ill-appearing. He is not toxic-appearing or diaphoretic.   HENT:      Head: Normocephalic and atraumatic.      Nose: Nose normal.      Mouth/Throat:      Mouth: Mucous membranes are moist.      Pharynx: Oropharynx is clear.   Eyes:      Extraocular Movements: Extraocular movements intact.      Conjunctiva/sclera: Conjunctivae normal.      Pupils: Pupils are equal, round, and reactive to light.   Cardiovascular:      Rate and Rhythm: Normal rate and regular rhythm.      Pulses: Normal pulses.   Pulmonary:      Effort: Pulmonary effort is normal.      Breath sounds: Examination of the right-lower field reveals decreased breath sounds. Examination of the left-lower field reveals decreased breath sounds. Decreased breath sounds present.   Abdominal:      General: Bowel sounds are normal.      Palpations: Abdomen is soft.   Musculoskeletal:         General: No swelling or tenderness.      Cervical back: Normal range of motion and neck supple.   Skin:     General: Skin is warm and dry.      Capillary Refill: Capillary refill takes less than 2 seconds.   Neurological:      General: No focal deficit present.      Mental Status: He is alert and oriented to person, place, and time.       Comments: Somewhat confused to situation   Psychiatric:         Mood and Affect: Mood normal.         Behavior: Behavior normal.         Cognition and Memory: Cognition is impaired.         Results Review     I reviewed the patient's new clinical results.  I reviewed the patient's telemetry  I reviewed the patient's chest xray  Results from last 7 days   Lab Units 09/03/21 0458 09/02/21 2050   WBC 10*3/mm3 5.17 7.32   HEMOGLOBIN g/dL 12.9* 14.0   PLATELETS 10*3/mm3 310 343     Results from last 7 days   Lab Units 09/03/21  0006 09/02/21 2050   SODIUM mmol/L 148* 147*   POTASSIUM mmol/L 4.2 3.8   CHLORIDE mmol/L 111* 108*   CO2 mmol/L 26.5 26.2   BUN mg/dL 60* 62*   CREATININE mg/dL 1.65* 1.77*   GLUCOSE mg/dL 119* 116*   Estimated Creatinine Clearance: 34.3 mL/min (A) (by C-G formula based on SCr of 1.65 mg/dL (H)).  Results from last 7 days   Lab Units 09/03/21  0006 09/02/21 2050   ALBUMIN g/dL 3.40* 3.80   BILIRUBIN mg/dL 1.8* 2.3*   ALK PHOS U/L 63 69   AST (SGOT) U/L 22 22   ALT (SGPT) U/L 21 21     Results from last 7 days   Lab Units 09/03/21  0006 09/02/21 2050   CALCIUM mg/dL 9.3 9.7   ALBUMIN g/dL 3.40* 3.80   MAGNESIUM mg/dL 3.0*  --      Results from last 7 days   Lab Units 09/03/21  0006 09/02/21 2050   PROCALCITONIN ng/mL  --  0.22   LACTATE mmol/L 1.5 2.2*     SARS-CoV-2, SURAJ   Date Value Ref Range Status   08/21/2021 Detected (A) Not Detected Final     Comment:     Patients who have a positive COVID-19 test result may now have  treatment options. Treatment options are available for patients  with mild to moderate symptoms and for hospitalized patients.  Visit our website at [email or web address] for  resources and information.  This nucleic acid amplification test was developed and its performance  characteristics determined by [employer]. Nucleic acid  amplification tests include RT-PCR and TMA. This test has not been  FDA cleared or approved. This test has been  authorized by FDA under  an Emergency Use Authorization (EUA). This test is only authorized  for the duration of time the declaration that circumstances exist  justifying the authorization of the emergency use of in vitro  diagnostic tests for detection of SARS-CoV-2 virus and/or diagnosis  of COVID-19 infection under section 564(b)(1) of the Act, 21 U.S.C.  360bbb-3(b) (1), unless the authorization is terminated or revoked  sooner.  When diagnostic testing is negative, the possibility of a false  negative result should be considered in the context of a patient's  recent exposures and the presence of clinical signs and symptoms  consistent with COVID-19. An individual without symptoms of COVID-19  and who is not shedding SARS-CoV-2 virus would expect to have a  negative (not detected) result in this assay.     No results found for: HGBA1C, POCGLU    Duplex Venous Lower Extremity - Bilateral CAR  · Normal bilateral lower extremity venous duplex scan.       Scheduled Medications  ampicillin, 1 g, Intravenous, Q6H  dexamethasone, 6 mg, Oral, Daily With Breakfast  enoxaparin, 40 mg, Subcutaneous, Q24H  pantoprazole, 40 mg, Oral, QAM  sodium chloride, 3 mL, Intravenous, Q12H    Infusions  Pharmacy Consult - Pharmacy to dose,   sodium chloride, 100 mL/hr, Last Rate: 100 mL/hr (09/03/21 0350)    Diet  Diet Regular       Assessment/Plan     Active Hospital Problems    Diagnosis  POA   • **Pneumonia due to COVID-19 virus [U07.1, J12.82]  Yes   • Metabolic encephalopathy [G93.41]  Yes   • Dehydration [E86.0]  Yes   • Hypernatremia [E87.0]  Yes   • ANA (acute kidney injury) (CMS/HCC) [N17.9]  Yes   • Stage 3a chronic kidney disease (CMS/HCC) [N18.31]  Yes   • Positive D-dimer [R79.89]  Yes   • Acute cystitis [N30.00]  Yes   • Hypoxia [R09.02]  Yes   • Debility [R53.81]  Yes      Resolved Hospital Problems   No resolved problems to display.   COVID-19 Pneumonia with Hypoxia  - diagnosed 8/21/21, patient is unvaccinated  -  hypoxic on admission-on decadron but unlikely to benefit from remdesivir given the timing of his diagnosis  - monitor inflammatory markers, LFTs, and renal function  - has elevated d-dimer, will follow-BLE venous dopplers are negative-cannot have CTA chest due to renal function will try to get a perfusion scan  - encourage pulmonary toilet and wean oxygen as tolerated    ANA on Stage 3a CKD  - likely pre-renal due to dehydration, also has hypernatremia  - Cr a little better today, sodium is not improved  - increase 1/2 NS to 125cc/hr  - check PVR    UTI  - outpatient urine culture grew enterococcus, PCN sensitive  - continue ampicillin    Metabolic Encephalopathy  - probably due to above issues, no focal deficits  - monitor with above treatment and redirect as needed      Lovenox 40 mg SC daily for DVT prophylaxis.  Full code.  Discussed with patient, nursing staff and care team on multidisciplinary rounds.  Anticipate discharge home in 1-2 days.      Frandy Linn MD  [facility]  09/03/21  20:22 EDT

## 2021-09-04 NOTE — CASE MANAGEMENT/SOCIAL WORK
Continued Stay Note  [facility]     Patient Name: Jake Kan  MRN: [id number]  Today's Date: 9/4/2021    Admit Date: 9/2/2021    Discharge Plan     Row Name 09/04/21 2355       Plan    Plan  Discharge home vs snf?    Provided Post Acute Provider List?  N/A    Provided Post Acute Provider Quality & Resource List?  N/A    Patient/Family in Agreement with Plan  unable to assess    Plan Comments  CCP spoke with Aliza Solo [phone] to obtain patient contact, gave only living blood relative of Rayray Kan (brother) [phone]. CCP spoke to Rayray Kan explained IMM wants letter emailed to [email or web address]. CCP emailed IMM. CCP to follow. Amparo Bautista RN.        Discharge Codes    No documentation.       Expected Discharge Date and Time     Expected Discharge Date Expected Discharge Time    Sep 7, 2021             Julee Bautista RN

## 2021-09-04 NOTE — NURSING NOTE
Pt. Bed alarm was going off, staff responded. Upon opeing the door, pt. was attempting to get out of bed. verbal redirection given as staff put on PPE. Pt. Ignored verbal commands to wait and sit back in bed, and fell. No injuries noted and no c/o pain. Provider call placed. Attempted to contact brother to notify of patient fall from bed. No answer from contact.

## 2021-09-04 NOTE — PLAN OF CARE
Problem: Adult Inpatient Plan of Care  Goal: Plan of Care Review  Outcome: Ongoing, Progressing  Flowsheets (Taken 9/4/2021 1619)  Progress: improving  Plan of Care Reviewed With: patient  Outcome Summary: pt. remains Alert and oriented, but still has moments of confusion. PT. IVF and abx continued as perscribed. pt. has urinary frequency which cause the pt. to attempt to get out of bed quickly. bed alarm set. pt. turns self in bed. pt. SB, as low as 34 this shift. provider notified. Will continue to monitor.  Goal: Patient-Specific Goal (Individualized)  Outcome: Ongoing, Progressing  Goal: Absence of Hospital-Acquired Illness or Injury  Outcome: Ongoing, Progressing  Intervention: Identify and Manage Fall Risk  Recent Flowsheet Documentation  Taken 9/4/2021 1600 by Curits Ramos, RN  Safety Promotion/Fall Prevention:   assistive device/personal items within reach   clutter free environment maintained   fall prevention program maintained   lighting adjusted   mobility aid in reach   nonskid shoes/slippers when out of bed   room organization consistent   safety round/check completed  Taken 9/4/2021 1400 by Curtis Ramos, RN  Safety Promotion/Fall Prevention:   assistive device/personal items within reach   clutter free environment maintained   fall prevention program maintained   lighting adjusted   mobility aid in reach   nonskid shoes/slippers when out of bed   room organization consistent   safety round/check completed  Taken 9/4/2021 1200 by Curtis Ramos, RN  Safety Promotion/Fall Prevention:   assistive device/personal items within reach   clutter free environment maintained   fall prevention program maintained   lighting adjusted   mobility aid in reach   nonskid shoes/slippers when out of bed   room organization consistent   safety round/check completed  Taken 9/4/2021 1000 by Curtis Ramos, RN  Safety Promotion/Fall Prevention:   assistive device/personal items within reach   clutter free environment  maintained   fall prevention program maintained   lighting adjusted   mobility aid in reach   nonskid shoes/slippers when out of bed   room organization consistent   safety round/check completed  Taken 9/4/2021 0800 by Curtis Ramos RN  Safety Promotion/Fall Prevention:   assistive device/personal items within reach   clutter free environment maintained   fall prevention program maintained   lighting adjusted   mobility aid in reach   nonskid shoes/slippers when out of bed   room organization consistent   safety round/check completed  Intervention: Prevent Infection  Recent Flowsheet Documentation  Taken 9/4/2021 1600 by Curtis Ramos RN  Infection Prevention:   cohorting utilized   environmental surveillance performed   hand hygiene promoted   personal protective equipment utilized   rest/sleep promoted   single patient room provided  Taken 9/4/2021 1400 by Curtis Ramos RN  Infection Prevention:   cohorting utilized   environmental surveillance performed   hand hygiene promoted   personal protective equipment utilized   rest/sleep promoted   single patient room provided  Taken 9/4/2021 1200 by Curtis Ramos RN  Infection Prevention:   cohorting utilized   environmental surveillance performed   hand hygiene promoted   personal protective equipment utilized   rest/sleep promoted   single patient room provided  Taken 9/4/2021 1000 by Curtis Ramos RN  Infection Prevention:   cohorting utilized   environmental surveillance performed   hand hygiene promoted   personal protective equipment utilized   rest/sleep promoted   single patient room provided  Taken 9/4/2021 0800 by Curtis Ramos RN  Infection Prevention:   cohorting utilized   environmental surveillance performed   hand hygiene promoted   personal protective equipment utilized   rest/sleep promoted   single patient room provided  Goal: Optimal Comfort and Wellbeing  Outcome: Ongoing, Progressing  Goal: Readiness for Transition of Care  Outcome: Ongoing,  Progressing     Problem: Fall Injury Risk  Goal: Absence of Fall and Fall-Related Injury  Outcome: Ongoing, Progressing  Intervention: Identify and Manage Contributors to Fall Injury Risk  Recent Flowsheet Documentation  Taken 9/4/2021 1600 by Curtis Ramos RN  Medication Review/Management: medications reviewed  Taken 9/4/2021 1400 by Curtis Ramos RN  Medication Review/Management: medications reviewed  Taken 9/4/2021 1200 by Curtis Ramos RN  Medication Review/Management: medications reviewed  Taken 9/4/2021 1000 by Curtis Ramos RN  Medication Review/Management: medications reviewed  Taken 9/4/2021 0800 by Curtis Ramos RN  Medication Review/Management: medications reviewed  Intervention: Promote Injury-Free Environment  Recent Flowsheet Documentation  Taken 9/4/2021 1600 by Curtis Ramos RN  Safety Promotion/Fall Prevention:   assistive device/personal items within reach   clutter free environment maintained   fall prevention program maintained   lighting adjusted   mobility aid in reach   nonskid shoes/slippers when out of bed   room organization consistent   safety round/check completed  Taken 9/4/2021 1400 by Curtis Ramos RN  Safety Promotion/Fall Prevention:   assistive device/personal items within reach   clutter free environment maintained   fall prevention program maintained   lighting adjusted   mobility aid in reach   nonskid shoes/slippers when out of bed   room organization consistent   safety round/check completed  Taken 9/4/2021 1200 by Curtis Ramos RN  Safety Promotion/Fall Prevention:   assistive device/personal items within reach   clutter free environment maintained   fall prevention program maintained   lighting adjusted   mobility aid in reach   nonskid shoes/slippers when out of bed   room organization consistent   safety round/check completed  Taken 9/4/2021 1000 by Curtis Ramos RN  Safety Promotion/Fall Prevention:   assistive device/personal items within reach   clutter free  environment maintained   fall prevention program maintained   lighting adjusted   mobility aid in reach   nonskid shoes/slippers when out of bed   room organization consistent   safety round/check completed  Taken 9/4/2021 0800 by Curtis Ramos RN  Safety Promotion/Fall Prevention:   assistive device/personal items within reach   clutter free environment maintained   fall prevention program maintained   lighting adjusted   mobility aid in reach   nonskid shoes/slippers when out of bed   room organization consistent   safety round/check completed     Problem: Skin Injury Risk Increased  Goal: Skin Health and Integrity  Outcome: Ongoing, Progressing   Goal Outcome Evaluation:  Plan of Care Reviewed With: patient        Progress: improving  Outcome Summary: pt. remains Alert and oriented, but still has moments of confusion. PT. IVF and abx continued as perscribed. pt. has urinary frequency which cause the pt. to attempt to get out of bed quickly. bed alarm set. pt. turns self in bed. pt. SB, as low as 34 this shift. provider notified. Will continue to monitor.

## 2021-09-04 NOTE — PROGRESS NOTES
Name: Jake Kan ADMIT: 2021   : 1945  PCP: Sree Thomas MD    MRN: [id number] LOS: 2 days   AGE/SEX: 76 y.o. male  ROOM: [facility]     Subjective   Subjective   CC: confusion  No acute events. Patient has no new complaints. Dyspnea is unchanged. Cough is non-productive. Taking PO. No CP/f/c/n/v/d.    Objective   Objective   Vital Signs  Temp:  [96.4 °F (35.8 °C)-97.9 °F (36.6 °C)] 97.9 °F (36.6 °C)  Heart Rate:  [55-66] 55  Resp:  [16-17] 17  BP: (131-151)/(70-87) 151/87  SpO2:  [91 %-98 %] 98 %  on  Flow (L/min):  [2] 2;   Device (Oxygen Therapy): room air  Body mass index is 20.24 kg/m².  Physical Exam  Vitals and nursing note reviewed.   Constitutional:       General: He is not in acute distress.     Appearance: He is ill-appearing. He is not toxic-appearing or diaphoretic.   HENT:      Head: Normocephalic and atraumatic.      Nose: Nose normal.      Mouth/Throat:      Mouth: Mucous membranes are moist.      Pharynx: Oropharynx is clear.   Eyes:      Extraocular Movements: Extraocular movements intact.      Conjunctiva/sclera: Conjunctivae normal.      Pupils: Pupils are equal, round, and reactive to light.   Cardiovascular:      Rate and Rhythm: Normal rate and regular rhythm.      Pulses: Normal pulses.   Pulmonary:      Effort: Pulmonary effort is normal.      Breath sounds: Examination of the right-lower field reveals decreased breath sounds. Examination of the left-lower field reveals decreased breath sounds. Decreased breath sounds present.   Abdominal:      General: Bowel sounds are normal.      Palpations: Abdomen is soft.   Musculoskeletal:         General: No swelling or tenderness.      Cervical back: Normal range of motion and neck supple.   Skin:     General: Skin is warm and dry.      Capillary Refill: Capillary refill takes less than 2 seconds.   Neurological:      General: No focal deficit present.      Mental Status: He is alert and oriented to person, place, and time.       Comments: Somewhat confused to situation   Psychiatric:         Mood and Affect: Mood normal.         Behavior: Behavior normal.         Cognition and Memory: Cognition is impaired.         Results Review     I reviewed the patient's new clinical results.  I reviewed the patient's telemetry  Results from last 7 days   Lab Units 09/04/21 0613 09/03/21 0458 09/02/21 2050   WBC 10*3/mm3 6.81 5.17 7.32   HEMOGLOBIN g/dL 11.5* 12.9* 14.0   PLATELETS 10*3/mm3 297 310 343     Results from last 7 days   Lab Units 09/04/21  0613 09/03/21  0006 09/02/21 2050   SODIUM mmol/L 139 148* 147*   POTASSIUM mmol/L 3.8 4.2 3.8   CHLORIDE mmol/L 109* 111* 108*   CO2 mmol/L 20.3* 26.5 26.2   BUN mg/dL 42* 60* 62*   CREATININE mg/dL 1.04 1.65* 1.77*   GLUCOSE mg/dL 115* 119* 116*   Estimated Creatinine Clearance: 54.5 mL/min (by C-G formula based on SCr of 1.04 mg/dL).  Results from last 7 days   Lab Units 09/04/21  0613 09/03/21 0006 09/02/21 2050   ALBUMIN g/dL 2.50* 3.40* 3.80   BILIRUBIN mg/dL 1.1 1.8* 2.3*   ALK PHOS U/L 52 63 69   AST (SGOT) U/L 17 22 22   ALT (SGPT) U/L 18 21 21     Results from last 7 days   Lab Units 09/04/21 0613 09/03/21  0006 09/02/21 2050   CALCIUM mg/dL 8.3* 9.3 9.7   ALBUMIN g/dL 2.50* 3.40* 3.80   MAGNESIUM mg/dL 2.8* 3.0*  --      Results from last 7 days   Lab Units 09/03/21  0006 09/02/21 2050   PROCALCITONIN ng/mL  --  0.22   LACTATE mmol/L 1.5 2.2*     SARS-CoV-2, SURAJ   Date Value Ref Range Status   08/21/2021 Detected (A) Not Detected Final     Comment:     Patients who have a positive COVID-19 test result may now have  treatment options. Treatment options are available for patients  with mild to moderate symptoms and for hospitalized patients.  Visit our website at [email or web address] for  resources and information.  This nucleic acid amplification test was developed and its performance  characteristics determined by [employer]. Nucleic acid  amplification tests  include RT-PCR and TMA. This test has not been  FDA cleared or approved. This test has been authorized by FDA under  an Emergency Use Authorization (EUA). This test is only authorized  for the duration of time the declaration that circumstances exist  justifying the authorization of the emergency use of in vitro  diagnostic tests for detection of SARS-CoV-2 virus and/or diagnosis  of COVID-19 infection under section 564(b)(1) of the Act, 21 U.S.C.  360bbb-3(b) (1), unless the authorization is terminated or revoked  sooner.  When diagnostic testing is negative, the possibility of a false  negative result should be considered in the context of a patient's  recent exposures and the presence of clinical signs and symptoms  consistent with COVID-19. An individual without symptoms of COVID-19  and who is not shedding SARS-CoV-2 virus would expect to have a  negative (not detected) result in this assay.     No results found for: HGBA1C, POCGLU    NM Lung Scan Perfusion Particulate  Narrative: NM LUNG SCAN PERFUSION PARTICULATE-     CLINICAL HISTORY: Acute onset chest pain. Positive d-dimer.     TECHNIQUE: The patient was injected with 5.5 mCi of technetium MAA.  Anterior, posterior, lateral and oblique views of the lungs were  acquired.      COMPARISON: None     FINDINGS: There is homogeneous distribution of the radiopharmaceutical  within both lungs. No defects suspicious for pulmonary embolism are  identified.     Impression: Normal perfusion lung scan.     This report was finalized on 9/4/2021 10:08 AM by Dr. Primitivo Viera M.D.       Scheduled Medications  ampicillin, 1 g, Intravenous, Q6H  dexamethasone, 6 mg, Oral, Daily With Breakfast  enoxaparin, 40 mg, Subcutaneous, Q24H  pantoprazole, 40 mg, Oral, QAM  sodium chloride, 3 mL, Intravenous, Q12H    Infusions  Pharmacy Consult - Pharmacy to dose,   sodium chloride, 125 mL/hr, Last Rate: 125 mL/hr (09/04/21 0620)    Diet  Diet Regular       Assessment/Plan     Active  Hospital Problems    Diagnosis  POA   • **Pneumonia due to COVID-19 virus [U07.1, J12.82]  Yes   • Metabolic encephalopathy [G93.41]  Yes   • Dehydration [E86.0]  Yes   • Hypernatremia [E87.0]  Yes   • ANA (acute kidney injury) (CMS/HCC) [N17.9]  Yes   • Stage 3a chronic kidney disease (CMS/HCC) [N18.31]  Yes   • Positive D-dimer [R79.89]  Yes   • Acute cystitis [N30.00]  Yes   • Hypoxia [R09.02]  Yes   • Debility [R53.81]  Yes      Resolved Hospital Problems   No resolved problems to display.   COVID-19 Pneumonia with Hypoxia  - diagnosed 8/21/21, patient is unvaccinated  - hypoxic on admission-on decadron but unlikely to benefit from remdesivir given the timing of his diagnosis  - monitor inflammatory markers, LFTs, and renal function  - has elevated d-dimer, will follow-BLE venous dopplers are negative-cannot have CTA chest due to renal function, perfusion scan is normal  - encourage pulmonary toilet and wean oxygen as tolerated    ANA on Stage 3a CKD  - likely pre-renal due to dehydration, also has hypernatremia  - Cr and sodium have improved, no e/o urine retention  - switch 1/2NS to LR    UTI  - outpatient urine culture grew enterococcus, PCN sensitive  - continue ampicillin    Metabolic Encephalopathy  - probably due to above issues, no focal deficits  - monitor with above treatment and redirect as needed      Lovenox 40 mg SC daily for DVT prophylaxis.  Full code.  Discussed with patient and nursing staff.  Anticipate discharge home in 1-2 days.      Frandy Linn MD  [facility]  09/04/21  17:56 EDT

## 2021-09-05 LAB
ALBUMIN SERPL-MCNC: 3 G/DL (ref 3.5–5.2)
ALBUMIN/GLOB SERPL: 1 G/DL
ALP SERPL-CCNC: 52 U/L (ref 39–117)
ALT SERPL W P-5'-P-CCNC: 19 U/L (ref 1–41)
ANION GAP SERPL CALCULATED.3IONS-SCNC: 7.7 MMOL/L (ref 5–15)
AST SERPL-CCNC: 21 U/L (ref 1–40)
BASOPHILS # BLD AUTO: 0.01 10*3/MM3 (ref 0–0.2)
BASOPHILS NFR BLD AUTO: 0.1 % (ref 0–1.5)
BILIRUB SERPL-MCNC: 1 MG/DL (ref 0–1.2)
BUN SERPL-MCNC: 29 MG/DL (ref 8–23)
BUN/CREAT SERPL: 28.7 (ref 7–25)
CALCIUM SPEC-SCNC: 8.6 MG/DL (ref 8.6–10.5)
CHLORIDE SERPL-SCNC: 110 MMOL/L (ref 98–107)
CO2 SERPL-SCNC: 22.3 MMOL/L (ref 22–29)
CREAT SERPL-MCNC: 1.01 MG/DL (ref 0.76–1.27)
CRP SERPL-MCNC: 3.89 MG/DL (ref 0–0.5)
D DIMER PPP FEU-MCNC: 1.91 MCGFEU/ML (ref 0–0.49)
DEPRECATED RDW RBC AUTO: 43.1 FL (ref 37–54)
EOSINOPHIL # BLD AUTO: 0.02 10*3/MM3 (ref 0–0.4)
EOSINOPHIL NFR BLD AUTO: 0.3 % (ref 0.3–6.2)
ERYTHROCYTE [DISTWIDTH] IN BLOOD BY AUTOMATED COUNT: 13 % (ref 12.3–15.4)
FERRITIN SERPL-MCNC: 2489 NG/ML (ref 30–400)
GFR SERPL CREATININE-BSD FRML MDRD: 71 ML/MIN/1.73
GLOBULIN UR ELPH-MCNC: 2.9 GM/DL
GLUCOSE SERPL-MCNC: 92 MG/DL (ref 65–99)
HCT VFR BLD AUTO: 35.9 % (ref 37.5–51)
HGB BLD-MCNC: 12.3 G/DL (ref 13–17.7)
IMM GRANULOCYTES # BLD AUTO: 0.06 10*3/MM3 (ref 0–0.05)
IMM GRANULOCYTES NFR BLD AUTO: 0.9 % (ref 0–0.5)
LYMPHOCYTES # BLD AUTO: 1 10*3/MM3 (ref 0.7–3.1)
LYMPHOCYTES NFR BLD AUTO: 14.5 % (ref 19.6–45.3)
MAGNESIUM SERPL-MCNC: 2.6 MG/DL (ref 1.6–2.4)
MCH RBC QN AUTO: 31.8 PG (ref 26.6–33)
MCHC RBC AUTO-ENTMCNC: 34.3 G/DL (ref 31.5–35.7)
MCV RBC AUTO: 92.8 FL (ref 79–97)
MONOCYTES # BLD AUTO: 0.46 10*3/MM3 (ref 0.1–0.9)
MONOCYTES NFR BLD AUTO: 6.6 % (ref 5–12)
NEUTROPHILS NFR BLD AUTO: 5.37 10*3/MM3 (ref 1.7–7)
NEUTROPHILS NFR BLD AUTO: 77.6 % (ref 42.7–76)
NRBC BLD AUTO-RTO: 0 /100 WBC (ref 0–0.2)
PLATELET # BLD AUTO: 327 10*3/MM3 (ref 140–450)
PMV BLD AUTO: 10.6 FL (ref 6–12)
POTASSIUM SERPL-SCNC: 3.8 MMOL/L (ref 3.5–5.2)
PROT SERPL-MCNC: 5.9 G/DL (ref 6–8.5)
RBC # BLD AUTO: 3.87 10*6/MM3 (ref 4.14–5.8)
SODIUM SERPL-SCNC: 140 MMOL/L (ref 136–145)
WBC # BLD AUTO: 6.92 10*3/MM3 (ref 3.4–10.8)

## 2021-09-05 PROCEDURE — 63710000001 DEXAMETHASONE PER 0.25 MG: Performed by: INTERNAL MEDICINE

## 2021-09-05 PROCEDURE — 83735 ASSAY OF MAGNESIUM: CPT | Performed by: INTERNAL MEDICINE

## 2021-09-05 PROCEDURE — 25010000002 AMPICILLIN PER 500 MG: Performed by: INTERNAL MEDICINE

## 2021-09-05 PROCEDURE — 86140 C-REACTIVE PROTEIN: CPT | Performed by: INTERNAL MEDICINE

## 2021-09-05 PROCEDURE — 82728 ASSAY OF FERRITIN: CPT | Performed by: INTERNAL MEDICINE

## 2021-09-05 PROCEDURE — 80053 COMPREHEN METABOLIC PANEL: CPT | Performed by: INTERNAL MEDICINE

## 2021-09-05 PROCEDURE — 85379 FIBRIN DEGRADATION QUANT: CPT | Performed by: INTERNAL MEDICINE

## 2021-09-05 PROCEDURE — 25010000002 ENOXAPARIN PER 10 MG: Performed by: INTERNAL MEDICINE

## 2021-09-05 PROCEDURE — 85025 COMPLETE CBC W/AUTO DIFF WBC: CPT | Performed by: INTERNAL MEDICINE

## 2021-09-05 PROCEDURE — 36415 COLL VENOUS BLD VENIPUNCTURE: CPT | Performed by: INTERNAL MEDICINE

## 2021-09-05 RX ADMIN — AMPICILLIN SODIUM 1 G: 1 INJECTION, POWDER, FOR SOLUTION INTRAMUSCULAR; INTRAVENOUS at 23:35

## 2021-09-05 RX ADMIN — AMPICILLIN SODIUM 1 G: 1 INJECTION, POWDER, FOR SOLUTION INTRAMUSCULAR; INTRAVENOUS at 01:08

## 2021-09-05 RX ADMIN — SODIUM CHLORIDE, PRESERVATIVE FREE 3 ML: 5 INJECTION INTRAVENOUS at 20:54

## 2021-09-05 RX ADMIN — ENOXAPARIN SODIUM 40 MG: 40 INJECTION SUBCUTANEOUS at 10:16

## 2021-09-05 RX ADMIN — DEXAMETHASONE 6 MG: 4 TABLET ORAL at 10:16

## 2021-09-05 RX ADMIN — AMPICILLIN SODIUM 1 G: 1 INJECTION, POWDER, FOR SOLUTION INTRAMUSCULAR; INTRAVENOUS at 13:29

## 2021-09-05 RX ADMIN — AMPICILLIN SODIUM 1 G: 1 INJECTION, POWDER, FOR SOLUTION INTRAMUSCULAR; INTRAVENOUS at 18:31

## 2021-09-05 RX ADMIN — AMPICILLIN SODIUM 1 G: 1 INJECTION, POWDER, FOR SOLUTION INTRAMUSCULAR; INTRAVENOUS at 05:48

## 2021-09-05 NOTE — PLAN OF CARE
Problem: Adult Inpatient Plan of Care  Goal: Plan of Care Review  Flowsheets (Taken 9/5/2021 1658)  Plan of Care Reviewed With: patient  Outcome Summary: Patient remains confused, disoriented to place, time, and situation. NSR to sinus jay on monitor. Room air. Despite many verbal redirections and education regarding fall risk, patient continued to attempt to get out of bed. Physician notified, bilateral wrist restraints ordered for patient. Patient seems to be tolerating well. Will continue to monitor.   Goal Outcome Evaluation:  Plan of Care Reviewed With: patient           Outcome Summary: Patient remains confused, disoriented to place, time, and situation. NSR to sinus jay on monitor. Room air. Despite many verbal redirections and education regarding fall risk, patient continued to attempt to get out of bed. Physician notified, bilateral wrist restraints ordered for patient. Patient seems to be tolerating well. Will continue to monitor.

## 2021-09-05 NOTE — PLAN OF CARE
"  Problem: Adult Inpatient Plan of Care  Goal: Plan of Care Review  Outcome: Ongoing, Not Progressing  Flowsheets (Taken 9/5/2021 1544)  Progress: no change  Plan of Care Reviewed With: patient  Outcome Summary: pt. remains disoriented to place, time, and situation. pt. remains in bilateral NV resteraints d/t non-compliance and for the safety of the patient due to recent fall. pt. continues to have frequent inconinent episodes inbetween which he becomes restless in bed and \"needs to pee\", which typically has 100mL out with each episode. pt. is tolerating restraints well with no signs of injury or distress. pt. is confused but calm at this time. Will continue to monitor.  Goal: Patient-Specific Goal (Individualized)  Outcome: Ongoing, Not Progressing  Goal: Absence of Hospital-Acquired Illness or Injury  Outcome: Ongoing, Not Progressing  Intervention: Identify and Manage Fall Risk  Recent Flowsheet Documentation  Taken 9/5/2021 1400 by Curtis Ramos, RN  Safety Promotion/Fall Prevention:   assistive device/personal items within reach   clutter free environment maintained   fall prevention program maintained   lighting adjusted   mobility aid in reach   nonskid shoes/slippers when out of bed   room organization consistent   safety round/check completed  Taken 9/5/2021 1200 by Curtis Ramos, RN  Safety Promotion/Fall Prevention:   assistive device/personal items within reach   clutter free environment maintained   fall prevention program maintained   lighting adjusted   mobility aid in reach   nonskid shoes/slippers when out of bed   room organization consistent   safety round/check completed  Taken 9/5/2021 1000 by Curtis Ramos, RN  Safety Promotion/Fall Prevention:   assistive device/personal items within reach   clutter free environment maintained   fall prevention program maintained   mobility aid in reach   lighting adjusted   nonskid shoes/slippers when out of bed   room organization consistent   safety " round/check completed  Taken 9/5/2021 0800 by Curtis Ramos, ROD  Safety Promotion/Fall Prevention:   assistive device/personal items within reach   clutter free environment maintained   fall prevention program maintained   lighting adjusted   mobility aid in reach   nonskid shoes/slippers when out of bed   room organization consistent   safety round/check completed  Intervention: Prevent Infection  Recent Flowsheet Documentation  Taken 9/5/2021 1400 by Curtis Ramos, RN  Infection Prevention:   cohorting utilized   environmental surveillance performed   hand hygiene promoted   personal protective equipment utilized   rest/sleep promoted   single patient room provided  Taken 9/5/2021 1200 by Curtis Ramos RN  Infection Prevention:   cohorting utilized   environmental surveillance performed   hand hygiene promoted   rest/sleep promoted   personal protective equipment utilized   single patient room provided  Taken 9/5/2021 1000 by Curtis Ramos RN  Infection Prevention:   cohorting utilized   environmental surveillance performed   hand hygiene promoted   personal protective equipment utilized   rest/sleep promoted   single patient room provided  Taken 9/5/2021 0800 by Curtis Ramos RN  Infection Prevention:   cohorting utilized   environmental surveillance performed   hand hygiene promoted   personal protective equipment utilized   rest/sleep promoted   single patient room provided  Goal: Optimal Comfort and Wellbeing  Outcome: Ongoing, Not Progressing  Goal: Readiness for Transition of Care  Outcome: Ongoing, Not Progressing     Problem: Fall Injury Risk  Goal: Absence of Fall and Fall-Related Injury  Outcome: Ongoing, Not Progressing  Intervention: Identify and Manage Contributors to Fall Injury Risk  Recent Flowsheet Documentation  Taken 9/5/2021 1400 by Curtis Ramos, RN  Medication Review/Management: medications reviewed  Taken 9/5/2021 1200 by Curtis Ramos, RN  Medication Review/Management: medications  reviewed  Taken 9/5/2021 1000 by Curtis Ramos, RN  Medication Review/Management: medications reviewed  Taken 9/5/2021 0800 by Curtis Ramos, ROD  Medication Review/Management: medications reviewed  Intervention: Promote Injury-Free Environment  Recent Flowsheet Documentation  Taken 9/5/2021 1400 by Curtis Ramos, ROD  Safety Promotion/Fall Prevention:   assistive device/personal items within reach   clutter free environment maintained   fall prevention program maintained   lighting adjusted   mobility aid in reach   nonskid shoes/slippers when out of bed   room organization consistent   safety round/check completed  Taken 9/5/2021 1200 by Curtis Ramos, ROD  Safety Promotion/Fall Prevention:   assistive device/personal items within reach   clutter free environment maintained   fall prevention program maintained   lighting adjusted   mobility aid in reach   nonskid shoes/slippers when out of bed   room organization consistent   safety round/check completed  Taken 9/5/2021 1000 by Curtis Ramos RN  Safety Promotion/Fall Prevention:   assistive device/personal items within reach   clutter free environment maintained   fall prevention program maintained   mobility aid in reach   lighting adjusted   nonskid shoes/slippers when out of bed   room organization consistent   safety round/check completed  Taken 9/5/2021 0800 by Curtis Ramos, ROD  Safety Promotion/Fall Prevention:   assistive device/personal items within reach   clutter free environment maintained   fall prevention program maintained   lighting adjusted   mobility aid in reach   nonskid shoes/slippers when out of bed   room organization consistent   safety round/check completed     Problem: Skin Injury Risk Increased  Goal: Skin Health and Integrity  Outcome: Ongoing, Not Progressing     Problem: Restraint, Nonbehavioral (Nonviolent)  Goal: Discontinuation Criteria Achieved  Outcome: Ongoing, Not Progressing  Goal: Personal Dignity and Safety  "Maintained  Outcome: Ongoing, Not Progressing   Goal Outcome Evaluation:  Plan of Care Reviewed With: patient        Progress: no change  Outcome Summary: pt. remains disoriented to place, time, and situation. pt. remains in bilateral NV resteraints d/t non-compliance and for the safety of the patient due to recent fall. pt. continues to have frequent inconinent episodes inbetween which he becomes restless in bed and \"needs to pee\", which typically has 100mL out with each episode. pt. is tolerating restraints well with no signs of injury or distress. pt. is confused but calm at this time. Will continue to monitor.  "

## 2021-09-05 NOTE — NURSING NOTE
Patient found by Gonzalo MEEKS out the bed already, bed alarm on but did not go off.  Pt did not fell but fell earlier at morning shift at 1830.    Patient have been educated and reminded how many times of not trying to get out of bed.  Even put pt on purewick so he would not try to get up anymore to pee.  Frequently rounded on also.     Pt will be on soft wrist restraints.

## 2021-09-05 NOTE — PROGRESS NOTES
Name: Jake Kan ADMIT: 2021   : 1945  PCP: Sree Thomas MD    MRN: [id number] LOS: 3 days   AGE/SEX: 76 y.o. male  ROOM: [facility]     Subjective   Subjective   CC: confusion  No acute events. Patient has no new complaints. Dyspnea is unchanged. Cough is non-productive. Taking PO. No CP/f/c/n/v/d.    Objective   Objective   Vital Signs  Temp:  [96.8 °F (36 °C)-98.1 °F (36.7 °C)] 97.3 °F (36.3 °C)  Heart Rate:  [41-67] 67  Resp:  [16-18] 18  BP: (140-161)/(75-90) 150/75  SpO2:  [92 %-99 %] 96 %  on   ;   Device (Oxygen Therapy): room air  Body mass index is 20.24 kg/m².  Physical Exam  Vitals and nursing note reviewed.   Constitutional:       General: He is not in acute distress.     Appearance: He is ill-appearing. He is not toxic-appearing or diaphoretic.   HENT:      Head: Normocephalic and atraumatic.      Nose: Nose normal.      Mouth/Throat:      Mouth: Mucous membranes are moist.      Pharynx: Oropharynx is clear.   Eyes:      Extraocular Movements: Extraocular movements intact.      Conjunctiva/sclera: Conjunctivae normal.      Pupils: Pupils are equal, round, and reactive to light.   Cardiovascular:      Rate and Rhythm: Normal rate and regular rhythm.      Pulses: Normal pulses.   Pulmonary:      Effort: Pulmonary effort is normal.      Breath sounds: Examination of the right-lower field reveals decreased breath sounds. Examination of the left-lower field reveals decreased breath sounds. Decreased breath sounds present.   Abdominal:      General: Bowel sounds are normal.      Palpations: Abdomen is soft.   Musculoskeletal:         General: No swelling or tenderness.      Cervical back: Normal range of motion and neck supple.   Skin:     General: Skin is warm and dry.      Capillary Refill: Capillary refill takes less than 2 seconds.   Neurological:      General: No focal deficit present.      Mental Status: He is alert.   Psychiatric:         Mood and Affect: Mood normal.          Behavior: Behavior normal.         Cognition and Memory: Cognition is impaired.       Results Review     I reviewed the patient's new clinical results.  I reviewed the patient's telemetry  Results from last 7 days   Lab Units 09/05/21  0712 09/04/21 0613 09/03/21 0458 09/02/21 2050   WBC 10*3/mm3 6.92 6.81 5.17 7.32   HEMOGLOBIN g/dL 12.3* 11.5* 12.9* 14.0   PLATELETS 10*3/mm3 327 297 310 343     Results from last 7 days   Lab Units 09/05/21  0712 09/04/21 0613 09/03/21 0006 09/02/21 2050   SODIUM mmol/L 140 139 148* 147*   POTASSIUM mmol/L 3.8 3.8 4.2 3.8   CHLORIDE mmol/L 110* 109* 111* 108*   CO2 mmol/L 22.3 20.3* 26.5 26.2   BUN mg/dL 29* 42* 60* 62*   CREATININE mg/dL 1.01 1.04 1.65* 1.77*   GLUCOSE mg/dL 92 115* 119* 116*   Estimated Creatinine Clearance: 56.1 mL/min (by C-G formula based on SCr of 1.01 mg/dL).  Results from last 7 days   Lab Units 09/05/21 0712 09/04/21 0613 09/03/21 0006 09/02/21 2050   ALBUMIN g/dL 3.00* 2.50* 3.40* 3.80   BILIRUBIN mg/dL 1.0 1.1 1.8* 2.3*   ALK PHOS U/L 52 52 63 69   AST (SGOT) U/L 21 17 22 22   ALT (SGPT) U/L 19 18 21 21     Results from last 7 days   Lab Units 09/05/21 0712 09/04/21 0613 09/03/21 0006 09/02/21 2050   CALCIUM mg/dL 8.6 8.3* 9.3 9.7   ALBUMIN g/dL 3.00* 2.50* 3.40* 3.80   MAGNESIUM mg/dL 2.6* 2.8* 3.0*  --      Results from last 7 days   Lab Units 09/03/21 0006 09/02/21 2050   PROCALCITONIN ng/mL  --  0.22   LACTATE mmol/L 1.5 2.2*     SARS-CoV-2, SURAJ   Date Value Ref Range Status   08/21/2021 Detected (A) Not Detected Final     Comment:     Patients who have a positive COVID-19 test result may now have  treatment options. Treatment options are available for patients  with mild to moderate symptoms and for hospitalized patients.  Visit our website at [email or web address] for  resources and information.  This nucleic acid amplification test was developed and its performance  characteristics determined by [employer].  Nucleic acid  amplification tests include RT-PCR and TMA. This test has not been  FDA cleared or approved. This test has been authorized by FDA under  an Emergency Use Authorization (EUA). This test is only authorized  for the duration of time the declaration that circumstances exist  justifying the authorization of the emergency use of in vitro  diagnostic tests for detection of SARS-CoV-2 virus and/or diagnosis  of COVID-19 infection under section 564(b)(1) of the Act, 21 U.S.C.  360bbb-3(b) (1), unless the authorization is terminated or revoked  sooner.  When diagnostic testing is negative, the possibility of a false  negative result should be considered in the context of a patient's  recent exposures and the presence of clinical signs and symptoms  consistent with COVID-19. An individual without symptoms of COVID-19  and who is not shedding SARS-CoV-2 virus would expect to have a  negative (not detected) result in this assay.     No results found for: HGBA1C, POCGLU    NM Lung Scan Perfusion Particulate  Narrative: NM LUNG SCAN PERFUSION PARTICULATE-     CLINICAL HISTORY: Acute onset chest pain. Positive d-dimer.     TECHNIQUE: The patient was injected with 5.5 mCi of technetium MAA.  Anterior, posterior, lateral and oblique views of the lungs were  acquired.      COMPARISON: None     FINDINGS: There is homogeneous distribution of the radiopharmaceutical  within both lungs. No defects suspicious for pulmonary embolism are  identified.     Impression: Normal perfusion lung scan.     This report was finalized on 9/4/2021 10:08 AM by Dr. Primitivo Viera M.D.       Scheduled Medications  ampicillin, 1 g, Intravenous, Q6H  dexamethasone, 6 mg, Oral, Daily With Breakfast  enoxaparin, 40 mg, Subcutaneous, Q24H  pantoprazole, 40 mg, Oral, QAM  sodium chloride, 3 mL, Intravenous, Q12H    Infusions  lactated ringers, 75 mL/hr, Last Rate: 75 mL/hr (09/04/21 1821)  Pharmacy Consult - Pharmacy to dose,     Diet  Diet  Regular       Assessment/Plan     Active Hospital Problems    Diagnosis  POA   • **Pneumonia due to COVID-19 virus [U07.1, J12.82]  Yes   • Metabolic encephalopathy [G93.41]  Yes   • Dehydration [E86.0]  Yes   • Hypernatremia [E87.0]  Yes   • ANA (acute kidney injury) (CMS/HCC) [N17.9]  Yes   • Stage 3a chronic kidney disease (CMS/HCC) [N18.31]  Yes   • Positive D-dimer [R79.89]  Yes   • Acute cystitis [N30.00]  Yes   • Hypoxia [R09.02]  Yes   • Debility [R53.81]  Yes      Resolved Hospital Problems   No resolved problems to display.   COVID-19 Pneumonia with Hypoxia  - diagnosed 8/21/21, patient is unvaccinated  - hypoxic on admission-on decadron but unlikely to benefit from remdesivir given the timing of his diagnosis  - monitor inflammatory markers, LFTs, and renal function  - has elevated d-dimer, will follow-BLE venous dopplers are negative-cannot have CTA chest due to renal function, perfusion scan is normal  - encourage pulmonary toilet and wean oxygen as tolerated    ANA on Stage 3a CKD  - likely pre-renal due to dehydration, also has hypernatremia  - Cr and sodium have improved, no e/o urine retention  - continue LR    UTI  - outpatient urine culture grew enterococcus, PCN sensitive  - continue ampicillin    Metabolic Encephalopathy  - probably due to above issues, no focal deficits  - monitor with above treatment and redirect as needed      Lovenox 40 mg SC daily for DVT prophylaxis.  Full code.  Discussed with patient and nursing staff.  Anticipate discharge home in 1-2 days.      Frandy Linn MD  [facility]  09/05/21  17:43 EDT

## 2021-09-05 NOTE — NURSING NOTE
Patient was constantly getting up despite staff's verbal redirection and education regarding falls risk. Physician on call contacted, Dr. Looney ordered bilateral wrist restraints. Vitals taken, wrist restraints placed. House supervisor notified. Patient's brother notified. Will continue to monitor patient.

## 2021-09-06 LAB
ALBUMIN SERPL-MCNC: 2.8 G/DL (ref 3.5–5.2)
ALBUMIN/GLOB SERPL: 0.9 G/DL
ALP SERPL-CCNC: 50 U/L (ref 39–117)
ALT SERPL W P-5'-P-CCNC: 27 U/L (ref 1–41)
ANION GAP SERPL CALCULATED.3IONS-SCNC: 9.4 MMOL/L (ref 5–15)
AST SERPL-CCNC: 29 U/L (ref 1–40)
BASOPHILS # BLD AUTO: 0.01 10*3/MM3 (ref 0–0.2)
BASOPHILS NFR BLD AUTO: 0.2 % (ref 0–1.5)
BILIRUB SERPL-MCNC: 0.9 MG/DL (ref 0–1.2)
BUN SERPL-MCNC: 23 MG/DL (ref 8–23)
BUN/CREAT SERPL: 23.7 (ref 7–25)
CALCIUM SPEC-SCNC: 8.4 MG/DL (ref 8.6–10.5)
CHLORIDE SERPL-SCNC: 111 MMOL/L (ref 98–107)
CO2 SERPL-SCNC: 21.6 MMOL/L (ref 22–29)
CREAT SERPL-MCNC: 0.97 MG/DL (ref 0.76–1.27)
CRP SERPL-MCNC: 2.86 MG/DL (ref 0–0.5)
DEPRECATED RDW RBC AUTO: 42.6 FL (ref 37–54)
EOSINOPHIL # BLD AUTO: 0.04 10*3/MM3 (ref 0–0.4)
EOSINOPHIL NFR BLD AUTO: 0.7 % (ref 0.3–6.2)
ERYTHROCYTE [DISTWIDTH] IN BLOOD BY AUTOMATED COUNT: 12.8 % (ref 12.3–15.4)
FERRITIN SERPL-MCNC: 1673 NG/ML (ref 30–400)
GFR SERPL CREATININE-BSD FRML MDRD: 75 ML/MIN/1.73
GLOBULIN UR ELPH-MCNC: 3.1 GM/DL
GLUCOSE SERPL-MCNC: 92 MG/DL (ref 65–99)
HCT VFR BLD AUTO: 33.7 % (ref 37.5–51)
HGB BLD-MCNC: 11.8 G/DL (ref 13–17.7)
IMM GRANULOCYTES # BLD AUTO: 0.05 10*3/MM3 (ref 0–0.05)
IMM GRANULOCYTES NFR BLD AUTO: 0.8 % (ref 0–0.5)
LYMPHOCYTES # BLD AUTO: 0.97 10*3/MM3 (ref 0.7–3.1)
LYMPHOCYTES NFR BLD AUTO: 16.4 % (ref 19.6–45.3)
MAGNESIUM SERPL-MCNC: 2.5 MG/DL (ref 1.6–2.4)
MCH RBC QN AUTO: 32.2 PG (ref 26.6–33)
MCHC RBC AUTO-ENTMCNC: 35 G/DL (ref 31.5–35.7)
MCV RBC AUTO: 92.1 FL (ref 79–97)
MONOCYTES # BLD AUTO: 0.45 10*3/MM3 (ref 0.1–0.9)
MONOCYTES NFR BLD AUTO: 7.6 % (ref 5–12)
NEUTROPHILS NFR BLD AUTO: 4.39 10*3/MM3 (ref 1.7–7)
NEUTROPHILS NFR BLD AUTO: 74.3 % (ref 42.7–76)
NRBC BLD AUTO-RTO: 0 /100 WBC (ref 0–0.2)
PLATELET # BLD AUTO: 324 10*3/MM3 (ref 140–450)
PMV BLD AUTO: 10.5 FL (ref 6–12)
POTASSIUM SERPL-SCNC: 3.7 MMOL/L (ref 3.5–5.2)
PROT SERPL-MCNC: 5.9 G/DL (ref 6–8.5)
RBC # BLD AUTO: 3.66 10*6/MM3 (ref 4.14–5.8)
SODIUM SERPL-SCNC: 142 MMOL/L (ref 136–145)
WBC # BLD AUTO: 5.91 10*3/MM3 (ref 3.4–10.8)

## 2021-09-06 PROCEDURE — 97530 THERAPEUTIC ACTIVITIES: CPT | Performed by: PHYSICAL THERAPIST

## 2021-09-06 PROCEDURE — 85025 COMPLETE CBC W/AUTO DIFF WBC: CPT | Performed by: INTERNAL MEDICINE

## 2021-09-06 PROCEDURE — 80053 COMPREHEN METABOLIC PANEL: CPT | Performed by: INTERNAL MEDICINE

## 2021-09-06 PROCEDURE — 86140 C-REACTIVE PROTEIN: CPT | Performed by: INTERNAL MEDICINE

## 2021-09-06 PROCEDURE — 25010000002 ENOXAPARIN PER 10 MG: Performed by: INTERNAL MEDICINE

## 2021-09-06 PROCEDURE — 25010000002 AMPICILLIN PER 500 MG: Performed by: INTERNAL MEDICINE

## 2021-09-06 PROCEDURE — 83735 ASSAY OF MAGNESIUM: CPT | Performed by: INTERNAL MEDICINE

## 2021-09-06 PROCEDURE — 63710000001 DEXAMETHASONE PER 0.25 MG: Performed by: INTERNAL MEDICINE

## 2021-09-06 PROCEDURE — 92610 EVALUATE SWALLOWING FUNCTION: CPT | Performed by: SPEECH-LANGUAGE PATHOLOGIST

## 2021-09-06 PROCEDURE — 82728 ASSAY OF FERRITIN: CPT | Performed by: INTERNAL MEDICINE

## 2021-09-06 RX ORDER — OLANZAPINE 5 MG/1
5 TABLET ORAL NIGHTLY
Status: DISCONTINUED | OUTPATIENT
Start: 2021-09-06 | End: 2021-09-07

## 2021-09-06 RX ADMIN — DEXAMETHASONE 6 MG: 4 TABLET ORAL at 10:18

## 2021-09-06 RX ADMIN — SODIUM CHLORIDE, POTASSIUM CHLORIDE, SODIUM LACTATE AND CALCIUM CHLORIDE 75 ML/HR: 600; 310; 30; 20 INJECTION, SOLUTION INTRAVENOUS at 04:20

## 2021-09-06 RX ADMIN — SODIUM CHLORIDE, PRESERVATIVE FREE 3 ML: 5 INJECTION INTRAVENOUS at 20:34

## 2021-09-06 RX ADMIN — ENOXAPARIN SODIUM 40 MG: 40 INJECTION SUBCUTANEOUS at 10:18

## 2021-09-06 RX ADMIN — SODIUM CHLORIDE, PRESERVATIVE FREE 3 ML: 5 INJECTION INTRAVENOUS at 10:18

## 2021-09-06 RX ADMIN — AMPICILLIN SODIUM 1 G: 1 INJECTION, POWDER, FOR SOLUTION INTRAMUSCULAR; INTRAVENOUS at 06:08

## 2021-09-06 RX ADMIN — AMPICILLIN 1 G: 2 INJECTION, POWDER, FOR SOLUTION INTRAMUSCULAR; INTRAVENOUS at 12:40

## 2021-09-06 RX ADMIN — OLANZAPINE 5 MG: 5 TABLET ORAL at 20:34

## 2021-09-06 RX ADMIN — AMPICILLIN 1 G: 2 INJECTION, POWDER, FOR SOLUTION INTRAMUSCULAR; INTRAVENOUS at 18:02

## 2021-09-06 RX ADMIN — Medication 5 MG: at 01:17

## 2021-09-06 NOTE — PROGRESS NOTES
Name: Jake Kan ADMIT: 2021   : 1945  PCP: Sree Thomas MD    MRN: [id number] LOS: 4 days   AGE/SEX: 76 y.o. male  ROOM: [facility]     Subjective   Subjective   CC: confusion  No acute events. Patient has no new complaints. Dyspnea is unchanged. Cough is non-productive. Taking PO. No CP/f/c/n/v/d. He has been confused and very impulsive, requiring restraints.    Objective   Objective   Vital Signs  Temp:  [96.1 °F (35.6 °C)-97.7 °F (36.5 °C)] 96.8 °F (36 °C)  Heart Rate:  [55-70] 62  Resp:  [17-18] 18  BP: (153-161)/(56-95) 153/95  SpO2:  [93 %-95 %] 95 %  on   ;   Device (Oxygen Therapy): room air  Body mass index is 20.24 kg/m².  Physical Exam  Vitals and nursing note reviewed.   Constitutional:       General: He is not in acute distress.     Appearance: He is ill-appearing. He is not toxic-appearing or diaphoretic.   HENT:      Head: Normocephalic and atraumatic.      Nose: Nose normal.      Mouth/Throat:      Mouth: Mucous membranes are moist.      Pharynx: Oropharynx is clear.   Eyes:      Extraocular Movements: Extraocular movements intact.      Conjunctiva/sclera: Conjunctivae normal.      Pupils: Pupils are equal, round, and reactive to light.   Cardiovascular:      Rate and Rhythm: Normal rate and regular rhythm.      Pulses: Normal pulses.   Pulmonary:      Effort: Pulmonary effort is normal.      Breath sounds: Examination of the right-lower field reveals decreased breath sounds. Examination of the left-lower field reveals decreased breath sounds. Decreased breath sounds present.   Abdominal:      General: Bowel sounds are normal.      Palpations: Abdomen is soft.   Musculoskeletal:         General: No swelling or tenderness.      Cervical back: Normal range of motion and neck supple.   Skin:     General: Skin is warm and dry.      Capillary Refill: Capillary refill takes less than 2 seconds.   Neurological:      General: No focal deficit present.      Mental Status: He is alert.    Psychiatric:         Mood and Affect: Mood normal.         Behavior: Behavior normal.         Cognition and Memory: Cognition is impaired.       Results Review     I reviewed the patient's new clinical results.  I reviewed the patient's telemetry  Results from last 7 days   Lab Units 09/06/21  0707 09/05/21  0712 09/04/21 0613 09/03/21  0458   WBC 10*3/mm3 5.91 6.92 6.81 5.17   HEMOGLOBIN g/dL 11.8* 12.3* 11.5* 12.9*   PLATELETS 10*3/mm3 324 327 297 310     Results from last 7 days   Lab Units 09/06/21  0707 09/05/21  0712 09/04/21 0613 09/03/21  0006   SODIUM mmol/L 142 140 139 148*   POTASSIUM mmol/L 3.7 3.8 3.8 4.2   CHLORIDE mmol/L 111* 110* 109* 111*   CO2 mmol/L 21.6* 22.3 20.3* 26.5   BUN mg/dL 23 29* 42* 60*   CREATININE mg/dL 0.97 1.01 1.04 1.65*   GLUCOSE mg/dL 92 92 115* 119*   Estimated Creatinine Clearance: 58.4 mL/min (by C-G formula based on SCr of 0.97 mg/dL).  Results from last 7 days   Lab Units 09/06/21  0707 09/05/21  0712 09/04/21 0613 09/03/21  0006   ALBUMIN g/dL 2.80* 3.00* 2.50* 3.40*   BILIRUBIN mg/dL 0.9 1.0 1.1 1.8*   ALK PHOS U/L 50 52 52 63   AST (SGOT) U/L 29 21 17 22   ALT (SGPT) U/L 27 19 18 21     Results from last 7 days   Lab Units 09/06/21  0707 09/05/21  0712 09/04/21 0613 09/03/21  0006   CALCIUM mg/dL 8.4* 8.6 8.3* 9.3   ALBUMIN g/dL 2.80* 3.00* 2.50* 3.40*   MAGNESIUM mg/dL 2.5* 2.6* 2.8* 3.0*     Results from last 7 days   Lab Units 09/03/21  0006 09/02/21  2050   PROCALCITONIN ng/mL  --  0.22   LACTATE mmol/L 1.5 2.2*     SARS-CoV-2, SURAJ   Date Value Ref Range Status   08/21/2021 Detected (A) Not Detected Final     Comment:     Patients who have a positive COVID-19 test result may now have  treatment options. Treatment options are available for patients  with mild to moderate symptoms and for hospitalized patients.  Visit our website at [email or web address] for  resources and information.  This nucleic acid amplification test was developed and its  performance  characteristics determined by [employer]. Nucleic acid  amplification tests include RT-PCR and TMA. This test has not been  FDA cleared or approved. This test has been authorized by FDA under  an Emergency Use Authorization (EUA). This test is only authorized  for the duration of time the declaration that circumstances exist  justifying the authorization of the emergency use of in vitro  diagnostic tests for detection of SARS-CoV-2 virus and/or diagnosis  of COVID-19 infection under section 564(b)(1) of the Act, 21 U.S.C.  360bbb-3(b) (1), unless the authorization is terminated or revoked  sooner.  When diagnostic testing is negative, the possibility of a false  negative result should be considered in the context of a patient's  recent exposures and the presence of clinical signs and symptoms  consistent with COVID-19. An individual without symptoms of COVID-19  and who is not shedding SARS-CoV-2 virus would expect to have a  negative (not detected) result in this assay.     No results found for: HGBA1C, POCGLU    NM Lung Scan Perfusion Particulate  Narrative: NM LUNG SCAN PERFUSION PARTICULATE-     CLINICAL HISTORY: Acute onset chest pain. Positive d-dimer.     TECHNIQUE: The patient was injected with 5.5 mCi of technetium MAA.  Anterior, posterior, lateral and oblique views of the lungs were  acquired.      COMPARISON: None     FINDINGS: There is homogeneous distribution of the radiopharmaceutical  within both lungs. No defects suspicious for pulmonary embolism are  identified.     Impression: Normal perfusion lung scan.     This report was finalized on 9/4/2021 10:08 AM by Dr. Primitivo Viera M.D.       Scheduled Medications  ampicillin 1 g IVPB in 100 mL NS, 1 g, Intravenous, Q6H  dexamethasone, 6 mg, Oral, Daily With Breakfast  enoxaparin, 40 mg, Subcutaneous, Q24H  pantoprazole, 40 mg, Oral, QAM  sodium chloride, 3 mL, Intravenous, Q12H    Infusions  lactated ringers, 75 mL/hr, Last  Rate: 75 mL/hr (09/06/21 0420)  Pharmacy Consult - Pharmacy to dose,     Diet  Diet Regular       Assessment/Plan     Active Hospital Problems    Diagnosis  POA   • **Pneumonia due to COVID-19 virus [U07.1, J12.82]  Yes   • Metabolic encephalopathy [G93.41]  Yes   • Dehydration [E86.0]  Yes   • Hypernatremia [E87.0]  Yes   • ANA (acute kidney injury) (CMS/HCC) [N17.9]  Yes   • Stage 3a chronic kidney disease (CMS/HCC) [N18.31]  Yes   • Positive D-dimer [R79.89]  Yes   • Acute cystitis [N30.00]  Yes   • Hypoxia [R09.02]  Yes   • Debility [R53.81]  Yes      Resolved Hospital Problems   No resolved problems to display.   COVID-19 Pneumonia with Hypoxia  - diagnosed 8/21/21, patient is unvaccinated  - hypoxic on admission-on decadron but unlikely to benefit from remdesivir given the timing of his diagnosis  - monitor inflammatory markers, LFTs, and renal function  - has elevated d-dimer, will follow-BLE venous dopplers are negative-cannot have CTA chest due to renal function, perfusion scan is normal  - encourage pulmonary toilet and wean oxygen as tolerated    ANA on Stage 3a CKD  - likely pre-renal due to dehydration, also has hypernatremia  - Cr and sodium have improved, no e/o urine retention  - SLIV    UTI  - outpatient urine culture grew enterococcus, PCN sensitive  - finish course of ampicillin    Metabolic Encephalopathy  - probably due to above issues, no focal deficits  - monitor with above treatment and redirect as needed  - has been quite restless which is likely causing some delirium on top of this-will add nightly zyprexa      Lovenox 40 mg SC daily for DVT prophylaxis.  Full code.  Discussed with patient and nursing staff.  Anticipate discharge TBD in 1-2 days.      Frandy Linn MD  [facility]  09/06/21  16:28 EDT

## 2021-09-06 NOTE — PLAN OF CARE
Goal Outcome Evaluation:  Plan of Care Reviewed With: patient        Progress: no change  Outcome Summary: Patient remains on restraints-- additional posey vest ordered, restraints evaluated as needed, pt still trying to get out of bed, pt looks really tired and trying to fight sleep also, though pt is confused-- he is never combative, IVF, snacks offered and assisted, incontinent brief chnaged and wrap, NSR to SB, on RA, will monitor closely

## 2021-09-06 NOTE — THERAPY EVALUATION
Acute Care - Speech Language Pathology   Swallow Initial Evaluation [facility]     Patient Name: Jake Kan  : 1945  MRN: [id number]  Today's Date: 2021               Admit Date: 2021    Visit Dx:     ICD-10-CM ICD-9-CM   1. Pneumonia due to COVID-19 virus  U07.1 480.8    J12.82 079.89   2. Acute kidney injury (AAN) with acute tubular necrosis (ATN) (CMS/HCC)  N17.0 584.5   3. Acute UTI  N39.0 599.0   4. Altered mental status, unspecified altered mental status type  R41.82 780.97     Patient Active Problem List   Diagnosis   • Sciatica of right side   • Phlegm in throat   • Heartburn   • Abnormal EKG   • Debility   • Hyperlipidemia   • Other symptoms involving digestive system   • Paresthesias   • Pruritus   • Rosacea   • Seborrheic dermatitis   • Pneumonia due to COVID-19 virus   • Metabolic encephalopathy   • Dehydration   • Hypernatremia   • ANA (acute kidney injury) (CMS/HCC)   • Stage 3a chronic kidney disease (CMS/HCC)   • Positive D-dimer   • Acute cystitis   • Hypoxia     History reviewed. No pertinent past medical history.  Past Surgical History:   Procedure Laterality Date   • HERNIA REPAIR     • UMBILICAL HERNIA REPAIR N/A 2019    Procedure: excision of umbilical hernia mesh and umbilical hernia repair;  Surgeon: Luan Dowell MD;  Location: [facility] MAIN OR;  Service: General   • WRIST FRACTURE SURGERY Left    Patient was not wearing a face mask during this therapy encounter. Therapist used appropriate personal protective equipment including mask, eye protection and gloves.  Mask used was CAPR. Appropriate PPE was worn during the entire therapy session. Hand hygiene was completed before and after therapy session. Patient is in enhanced droplet precautions.               SLP Recommendation and Plan  SLP Swallowing Diagnosis: suspected pharyngeal dysphagia  SLP Diet Recommendation: soft textures, ground, nectar thick liquids, water between meals after oral care, with  supervision, ice chips between meals after oral care, with supervision  Recommended Precautions and Strategies: upright posture during/after eating, small bites of food and sips of liquid  SLP Rec. for Method of Medication Administration: meds whole, with pudding or applesauce, as tolerated     Monitor for Signs of Aspiration: yes, notify SLP if any concerns     Swallow Criteria for Skilled Therapeutic Interventions Met: demonstrates skilled criteria  Anticipated Discharge Disposition (SLP): unknown  Rehab Potential/Prognosis, Swallowing: adequate, monitor progress closely  Therapy Frequency (Swallow): PRN  Predicted Duration Therapy Intervention (Days): until discharge                         Plan of Care Reviewed With: patient  Outcome Summary: Clinical swallow evaluation completed recommend mechanical soft ground with NTL, meds whole with puree and small bites and sips upright 90 degrees with PO.         SWALLOW EVALUATION (last 72 hours)      SLP Adult Swallow Evaluation     Row Name 09/06/21 1500                   Rehab Evaluation    Document Type  evaluation  -KA        Subjective Information  no complaints  -KA        Patient Observations  alert;cooperative  -KA        Care Plan Review  evaluation/treatment results reviewed  -KA        Patient Effort  good  -KA        Symptoms Noted During/After Treatment  none  -KA           General Information    Patient Profile Reviewed  yes  -KA        Pertinent History Of Current Problem  COVID 19 PNA with hypoxia, RN reports patient coughs with thin liquids.  -KA        Current Method of Nutrition  regular textures;thin liquids  -KA        Precautions/Limitations, Vision  WFL;for purposes of eval  -KA        Precautions/Limitations, Hearing  hearing impairment, bilaterally  -KA        Prior Level of Function-Communication  unknown  -KA        Prior Level of Function-Swallowing  unknown  -KA        Plans/Goals Discussed with  patient  -KA        Barriers to Rehab   cognitive status  -KA        Patient's Goals for Discharge  no concerns voiced  -KA           Pain Scale: Numbers Pre/Post-Treatment    Pretreatment Pain Rating  0/10 - no pain  -KA        Posttreatment Pain Rating  0/10 - no pain  -KA           Oral Motor Structure and Function    Dentition Assessment  natural, present and adequate;missing teeth  -KA        Secretion Management  WNL/WFL  -KA        Mucosal Quality  moist, healthy  -KA           Oral Musculature and Cranial Nerve Assessment    Oral Motor General Assessment  generalized oral motor weakness  -           General Eating/Swallowing Observations    Consistencies Trialed  soft textures;chopped;mixed consistency;pureed;thin liquids;nectar/syrup-thick liquids  -        Post SpO2 (%)  93  -KA           Clinical Swallow Eval    Clinical Swallow Evaluation Summary  With thin liquid trials patient demonstrated coughing and no overt s/s of pen/asp with NTL. With puree and mechanical soft trials patient demonstrated no overt s/s of pen/asp and inconsistent oral holding up to 20 seconds and other times pt demonstrated timely mastication and swallow initiation, feel cognition a factor.   -KA           Clinical Impression    SLP Swallowing Diagnosis  suspected pharyngeal dysphagia  -KA        Functional Impact  risk of aspiration/pneumonia  -KA        Rehab Potential/Prognosis, Swallowing  adequate, monitor progress closely  -        Swallow Criteria for Skilled Therapeutic Interventions Met  demonstrates skilled criteria  -           Recommendations    Therapy Frequency (Swallow)  PRN  -KA        Predicted Duration Therapy Intervention (Days)  until discharge  -        SLP Diet Recommendation  soft textures;ground;nectar thick liquids;water between meals after oral care, with supervision;ice chips between meals after oral care, with supervision  -        Recommended Precautions and Strategies  upright posture during/after eating;small bites of food and  sips of liquid  -KA        Oral Care Recommendations  Oral Care BID/PRN  -KA        SLP Rec. for Method of Medication Administration  meds whole;with pudding or applesauce;as tolerated  -        Monitor for Signs of Aspiration  yes;notify SLP if any concerns  -KA        Anticipated Discharge Disposition (SLP)  unknown  -KA           Swallow Goals (SLP)    Oral Nutrition/Hydration Goal Selection (SLP)  oral nutrition/hydration, SLP goal 1  -KA           Oral Nutrition/Hydration Goal 1 (SLP)    Oral Nutrition/Hydration Goal 1, SLP  Patient will tolerate least restrictive diet  -KA        Time Frame (Oral Nutrition/Hydration Goal 1, SLP)  short term goal (STG);by discharge  -          User Key  (r) = Recorded By, (t) = Taken By, (c) = Cosigned By    Initials Name Effective Dates    Kel Terrazas MA,[facility]SLP 06/16/21 -           EDUCATION  The patient has been educated in the following areas:   Dysphagia (Swallowing Impairment).       SLP GOALS     Row Name 09/06/21 1500             Oral Nutrition/Hydration Goal 1 (SLP)    Oral Nutrition/Hydration Goal 1, SLP  Patient will tolerate least restrictive diet  -KA      Time Frame (Oral Nutrition/Hydration Goal 1, SLP)  short term goal (STG);by discharge  -        User Key  (r) = Recorded By, (t) = Taken By, (c) = Cosigned By    Initials Name Provider Type    Kel Terrazas MA,[facility]SLP Speech and Language Pathologist           SLP Outcome Measures (last 72 hours)      SLP Outcome Measures     Row Name 09/06/21 1500             SLP Outcome Measures    Outcome Measure Used?  Adult NOMS  -KA         Adult FCM Scores    FCM Chosen  Swallowing  -      Swallowing FCM Score  4  -        User Key  (r) = Recorded By, (t) = Taken By, (c) = Cosigned By    Initials Name Effective Dates    Kel Terrazas MA,[facility]-SLP 06/16/21 -            Time Calculation:   Time Calculation- SLP     Row Name 09/06/21 1548             Time Calculation- SLP    SLP Start Time  1300  -KA       SLP Received On  09/06/21  -KA         Untimed Charges    SLP Eval/Re-eval   ST Eval Oral Pharyng Swallow - 90010  -KA      [id number] Eval Oral Pharyng Swallow Minutes  60  -KA         Total Minutes    Untimed Charges Total Minutes  60  -KA       Total Minutes  60  -KA        User Key  (r) = Recorded By, (t) = Taken By, (c) = Cosigned By    Initials Name Provider Type     Kel Jay MA,CCC-SLP Speech and Language Pathologist          Therapy Charges for Today     Code Description Service Date Service Provider Modifiers Qty    [id number]  ST EVAL ORAL PHARYNG SWALLOW 4 9/6/2021 Kel Jay MA,CCC-SLP GN 1               Kel Jay MA,LIZ-SLP  9/6/2021

## 2021-09-06 NOTE — PLAN OF CARE
Goal Outcome Evaluation:  Plan of Care Reviewed With: patient           Outcome Summary: Clinical swallow evaluation completed recommend mechanical soft ground with NTL, meds whole with puree and small bites and sips upright 90 degrees with PO.

## 2021-09-06 NOTE — THERAPY TREATMENT NOTE
Patient Name: Jake Kan  : 1945    MRN: [id number]                              Today's Date: 2021       Admit Date: 2021    Visit Dx:     ICD-10-CM ICD-9-CM   1. Pneumonia due to COVID-19 virus  U07.1 480.8    J12.82 079.89   2. Acute kidney injury (ANA) with acute tubular necrosis (ATN) (CMS/HCC)  N17.0 584.5   3. Acute UTI  N39.0 599.0   4. Altered mental status, unspecified altered mental status type  R41.82 780.97     Patient Active Problem List   Diagnosis   • Sciatica of right side   • Phlegm in throat   • Heartburn   • Abnormal EKG   • Debility   • Hyperlipidemia   • Other symptoms involving digestive system   • Paresthesias   • Pruritus   • Rosacea   • Seborrheic dermatitis   • Pneumonia due to COVID-19 virus   • Metabolic encephalopathy   • Dehydration   • Hypernatremia   • ANA (acute kidney injury) (CMS/HCC)   • Stage 3a chronic kidney disease (CMS/HCC)   • Positive D-dimer   • Acute cystitis   • Hypoxia     History reviewed. No pertinent past medical history.  Past Surgical History:   Procedure Laterality Date   • HERNIA REPAIR     • UMBILICAL HERNIA REPAIR N/A 2019    Procedure: excision of umbilical hernia mesh and umbilical hernia repair;  Surgeon: Luan Dowell MD;  Location: [facility] OR;  Service: General   • WRIST FRACTURE SURGERY Left      General Information     Row Name 21 1001          Physical Therapy Time and Intention    Document Type  therapy note (daily note)  -     Mode of Treatment  individual therapy;physical therapy  -     Row Name 21 1001          General Information    Patient Profile Reviewed  yes  -     Existing Precautions/Restrictions  fall  -LC     Row Name 21 1001          Cognition    Orientation Status (Cognition)  oriented to;person;time  -     Row Name 21 1001          Safety Issues, Functional Mobility    Impairments Affecting Function (Mobility)  balance;endurance/activity tolerance;strength;cognition   -       User Key  (r) = Recorded By, (t) = Taken By, (c) = Cosigned By    Initials Name Provider Type    Hadley Serrato, PT DPT Physical Therapist        Mobility     Row Name 09/06/21 1003          Bed Mobility    Bed Mobility  bed mobility (all) activities  -     All Activities, [place] (Bed Mobility)  contact guard assist  -     Assistive Device (Bed Mobility)  bed rails  -     Row Name 09/06/21 1003          Transfers    Comment (Transfers)  Pt remained standing for 7 minutes during brief change, pericare, and linens change with no fatigue and O2 sat 90% throughout.  -     Row Name 09/06/21 1003          Sit-Stand Transfer    Sit-Stand [place] (Transfers)  contact guard  -     Assistive Device (Sit-Stand Transfers)  walker, front-wheeled  -     Row Name 09/06/21 1003          Gait/Stairs (Locomotion)    [place] Level (Gait)  minimum assist (75% patient effort)  -     Assistive Device (Gait)  walker, front-wheeled  -     Distance in Feet (Gait)  10 ft side stepping by bed  -     Deviations/Abnormal Patterns (Gait)  base of support, narrow;elijah decreased;festinating/shuffling;gait speed decreased;stride length decreased  -       User Key  (r) = Recorded By, (t) = Taken By, (c) = Cosigned By    Initials Name Provider Type    Hadley Serrato, PT DPT Physical Therapist        Obj/Interventions    No documentation.       Goals/Plan    No documentation.       Clinical Impression     Row Name 09/06/21 1009          Pain Scale: Numbers Pre/Post-Treatment    Pretreatment Pain Rating  0/10 - no pain  -     Posttreatment Pain Rating  0/10 - no pain  -     Pain Intervention(s)  Medication (See MAR);Repositioned  -     Row Name 09/06/21 1009          Plan of Care Review    Plan of Care Reviewed With  patient  -LC     Progress  improving  -     Outcome Summary  Pt AO to person and situation. pt in posey vest and soft wrist restraints. Pt very pleasant and agreeable to PT  today. He transferred supine to sit with CGA x 1. Pt transferred sit to stand with CGA x 1 and RW. Pt remained standing for 7 minutes during brief change, pericare, and linens change with no fatigue and O2 sat 90% throughout. Pt ambulated 10 ft side stepping at bedside with min x 1. Pt shows significant improvement and was very communicative with PT today.  -     Row Name 09/06/21 1009          Vital Signs    O2 Delivery Pre Treatment  room air  -     O2 Delivery Intra Treatment  room air  -     O2 Delivery Post Treatment  room air  -     Row Name 09/06/21 1009          Positioning and Restraints    Pre-Treatment Position  in bed  -LC     Post Treatment Position  chair  -LC     In Bed  notified nsg;call light within reach;encouraged to call for assist;exit alarm on;supine;with other staff  -     Restraints  reapplied:;vest;soft limb  -       User Key  (r) = Recorded By, (t) = Taken By, (c) = Cosigned By    Initials Name Provider Type    Hadley Serrato, PT DPT Physical Therapist        Outcome Measures     Row Name 09/06/21 1022          How much help from another person do you currently need...    Turning from your back to your side while in flat bed without using bedrails?  3  -LC     Moving from lying on back to sitting on the side of a flat bed without bedrails?  3  -LC     Moving to and from a bed to a chair (including a wheelchair)?  3  -LC     Standing up from a chair using your arms (e.g., wheelchair, bedside chair)?  3  -LC     Climbing 3-5 steps with a railing?  2  -LC     To walk in hospital room?  3  -LC     AM-PAC 6 Clicks Score (PT)  17  -     Row Name 09/06/21 1022          Functional Assessment    Outcome Measure Options  AM-PAC 6 Clicks Basic Mobility (PT)  -       User Key  (r) = Recorded By, (t) = Taken By, (c) = Cosigned By    Initials Name Provider Type    Hadley Serrato PT DPT Physical Therapist                       Physical Therapy Education                 Title: PT  OT SLP Therapies (In Progress)     Topic: Physical Therapy (In Progress)     Point: Mobility training (In Progress)     Learning Progress Summary           Patient Acceptance, E,D, NR by  at 9/6/2021 1022    Acceptance, E, NR by  at 9/3/2021 1242                   Point: Home exercise program (In Progress)     Learning Progress Summary           Patient Acceptance, E,D, NR by  at 9/6/2021 1022                   Point: Body mechanics (In Progress)     Learning Progress Summary           Patient Acceptance, E,D, NR by  at 9/6/2021 1022    Acceptance, E, NR by DJ at 9/3/2021 1242                   Point: Precautions (In Progress)     Learning Progress Summary           Patient Acceptance, E,D, NR by  at 9/6/2021 1022    Acceptance, E, NR by SOFI at 9/3/2021 1242                               User Key     Initials Effective Dates Name Provider Type Discipline     07/02/20 -  Hadley Coe, PT DPT Physical Therapist PT    SOFI 10/25/19 -  Dinora Torres, PT Physical Therapist PT              PT Recommendation and Plan     Plan of Care Reviewed With: patient  Progress: improving  Outcome Summary: Pt AO to person and situation. pt in posey vest and soft wrist restraints. Pt very pleasant and agreeable to PT today. He transferred supine to sit with CGA x 1. Pt transferred sit to stand with CGA x 1 and RW. Pt remained standing for 7 minutes during brief change, pericare, and linens change with no fatigue and O2 sat 90% throughout. Pt ambulated 10 ft side stepping at bedside with min x 1. Pt shows significant improvement and was very communicative with PT today.     Time Calculation:   PT Charges     Row Name 09/06/21 1022             Time Calculation    Start Time  0935  -      Stop Time  1007  -      Time Calculation (min)  32 min  -      PT Received On  09/06/21  -      PT - Next Appointment  09/07/21  -         Time Calculation- PT    Total Timed Code Minutes- PT  32 minute(s)  -         Timed Charges     18503 - PT Therapeutic Activity Minutes  32  -LC         Total Minutes    Timed Charges Total Minutes  32  -LC       Total Minutes  32  -LC        User Key  (r) = Recorded By, (t) = Taken By, (c) = Cosigned By    Initials Name Provider Type    Hadley Serrato, PT DPT Physical Therapist        Therapy Charges for Today     Code Description Service Date Service Provider Modifiers Qty    85578532011 HC PT THERAPEUTIC ACT EA 15 MIN 9/6/2021 Hadley Coe, PT DPT GP 2          PT G-Codes  Outcome Measure Options: AM-PAC 6 Clicks Basic Mobility (PT)  AM-PAC 6 Clicks Score (PT): 17  AM-PAC 6 Clicks Score (OT): 16  Modified [place] Scale: 4 - Moderately severe disability.  Unable to walk without assistance, and unable to attend to own bodily needs without assistance.    Hadley Coe PT DPT  9/6/2021

## 2021-09-06 NOTE — PROGRESS NOTES
"Adult Nutrition  Assessment/PES    Patient Name:  Jake Kan  YOB: 1945  MRN: [id number]  Admit Date:  9/2/2021    Assessment Date:  9/6/2021    Comments:  MST 2 - unsure weight status    Reviewed weight hx, down ~25 lb from last weight on file from 2019 (169 lb). Admitted with covid-19 pneumonia (unvaccinated). Has been in posey & wrist restraints due to confusion/TME, fall risk. Staff offering snacks, encouraging PO intake. Intake )-25%. Will order boost supplements TID with meals. Continue to monitor.     Reason for Assessment     Row Name 09/06/21 1453          Reason for Assessment    Reason For Assessment  identified at risk by screening criteria     Diagnosis  infection/sepsis;pulmonary disease;fluid status     Identified At Risk by Screening Criteria  MST SCORE 2+         Nutrition/Diet History     Row Name 09/06/21 1454          Nutrition/Diet History    Typical Food/Fluid Intake  PO <25% thus far. He's been confused and in restraints per nursing notes. They are trying to feed him, offer snacks.     Factors Affecting Nutritional Intake  impaired cognitive status/motor control;compromised airway         Anthropometrics     Row Name 09/06/21 1455          Anthropometrics    Height  180.3 cm (71\")        Admit Weight    Admit Weight  65.8 kg (145 lb)        Ideal Body Weight (IBW)    Ideal Body Weight (IBW) (kg)  79.27     % of Ideal Body Weight Assessment  other (see comments) 94%        Usual Body Weight (UBW)    Usual Body Weight  76.7 kg (169 lb) 169-172#     % of Usual Body Weight Assessment  85-95% - mild deficit 85%     Weight Loss  unintentional     Weight Loss Time Frame  Last wt to references from 2019, 169 lb.        Body Mass Index (BMI)    BMI Assessment  BMI 18.5-24.9: normal         Labs/Tests/Procedures/Meds     Row Name 09/06/21 1456          Labs/Procedures/Meds    Lab Results Reviewed  reviewed     Lab Results Comments  Cl, Mg, Alb, CRP, ferritin, h/h, d-dimer        " "Diagnostic Tests/Procedures    Diagnostic Test/Procedure Reviewed  reviewed        Medications    Pertinent Medications Reviewed  reviewed     Pertinent Medications Comments  decadron, lovenox, protonic, IVF 75 cc         Physical Findings     Row Name 09/06/21 1457          Physical Findings    Overall Physical Appearance  other (see comments) room air     Oral/Mouth Cavity  poor dentition     Skin  other (see comments) Manny Zuniga         Estimated/Assessed Needs     Row Name 09/06/21 1458 09/06/21 1455       Calculation Measurements    Weight Used For Calculations  65.8 kg (145 lb)  --    Height  --  180.3 cm (71\")       Estimated/Assessed Needs    Additional Documentation  Protein Requirements (Group);KCAL/KG (Group);Fluid Requirements (Group);[place]-St. Jeor Equation (Group)  --       KCAL/KG    KCAL/KG  25 Kcal/Kg (kcal);30 Kcal/Kg (kcal)  --    25 Kcal/Kg (kcal)  1644.3  --    30 Kcal/Kg (kcal)  1973.16  --       [place]-St. Jeor Equation    RMR ([place]-St. Jeor Equation)  1399.845  --    [place]-St. Jeor Activity Factors  1.2  --    Activity Factors ([place]-St. Jeor)  1679.814  --       Protein Requirements    Weight Used For Protein Calculations  66.2 kg (146 lb)  --    Est Protein Requirement Amount (gms/kg)  1.2 gm protein  --    Estimated Protein Requirements (gms/day)  79.47  --       Fluid Requirements    Fluid Requirements (mL/day)  1679  --    Estimated Fluid Requirement Method  RDA Method  --    RDA Method (mL)  1679  --        Nutrition Prescription Ordered     Row Name 09/06/21 1504          Nutrition Prescription PO    Current PO Diet  Regular     Fluid Consistency  Thin                 Problem/Interventions:  Problem 1     Row Name 09/06/21 1505          Nutrition Diagnoses Problem 1    Problem 1  Inadequate Nutrient Intake     Etiology (related to)  Factors Affecting Nutrition;Functional Diagnosis     Functional Diagnosis  Cognitive deficit     Reported/Observed By  Nursing Assistant;RD/Tech  "    Appetite  Poor at this Time     Mental State/Condition  Impaired Cognitive Status/Motor Control;Weakness               Intervention Goal     Row Name 09/06/21 1505          Intervention Goal    General  Maintain nutrition;Disease management/therapy     PO  Tolerate PO;PO intake (%);Increase intake     PO Intake %  75 %     Weight  Appropriate weight gain         Nutrition Intervention     Row Name 09/06/21 1506          Nutrition Intervention    RD/Tech Action  Follow Tx progress;Care plan reviewd;Encourage intake;Recommend/ordered     Recommended/Ordered  Supplement         Nutrition Prescription     Row Name 09/06/21 1506          Nutrition Prescription PO    PO Prescription  Begin/change supplement     Supplement  Boost Plus     Supplement Frequency  3 times a day         Education/Evaluation     Row Name 09/06/21 1506          Education    Education  Education not appropriate at this time        Monitor/Evaluation    Monitor  Per protocol           Electronically signed by:  Marie Bruce RD  09/06/21 15:07 EDT

## 2021-09-06 NOTE — PLAN OF CARE
Goal Outcome Evaluation:  Plan of Care Reviewed With: patient        Progress: improving  Outcome Summary: Pt rested throughout day. Restraints continued; patient restless more this afternoon than this AM. SLP raiza today. Worked with PT. IVF d/c. Pt continues with decreased appetite. Will continue to monitor closely

## 2021-09-06 NOTE — PLAN OF CARE
Goal Outcome Evaluation:  Plan of Care Reviewed With: patient        Progress: improving  Outcome Summary: Pt AO to person and situation. pt in posey vest and soft wrist restraints. Pt very pleasant and agreeable to PT today. He transferred supine to sit with CGA x 1. Pt transferred sit to stand with CGA x 1 and RW. Pt remained standing for 7 minutes during brief change, pericare, and linens change with no fatigue and O2 sat 90% throughout. Pt ambulated 10 ft side stepping at bedside with min x 1. Pt shows significant improvement and was very communicative with PT today.    PPE: Patient was placed in face mask in first look. Patient was wearing facemask when I entered the room and throughout our encounter. I wore full protective equipment throughout this patient encounter including a face mask, and gloves. Hand hygiene was performed before donning protective equipment and after removal when leaving the room.

## 2021-09-07 LAB
ALBUMIN SERPL-MCNC: 2.8 G/DL (ref 3.5–5.2)
ALBUMIN/GLOB SERPL: 0.8 G/DL
ALP SERPL-CCNC: 51 U/L (ref 39–117)
ALT SERPL W P-5'-P-CCNC: 43 U/L (ref 1–41)
ANION GAP SERPL CALCULATED.3IONS-SCNC: 6.3 MMOL/L (ref 5–15)
AST SERPL-CCNC: 43 U/L (ref 1–40)
BACTERIA SPEC AEROBE CULT: NORMAL
BACTERIA SPEC AEROBE CULT: NORMAL
BASOPHILS # BLD AUTO: 0.02 10*3/MM3 (ref 0–0.2)
BASOPHILS NFR BLD AUTO: 0.3 % (ref 0–1.5)
BILIRUB SERPL-MCNC: 0.9 MG/DL (ref 0–1.2)
BUN SERPL-MCNC: 22 MG/DL (ref 8–23)
BUN/CREAT SERPL: 21.2 (ref 7–25)
CALCIUM SPEC-SCNC: 8.3 MG/DL (ref 8.6–10.5)
CHLORIDE SERPL-SCNC: 109 MMOL/L (ref 98–107)
CO2 SERPL-SCNC: 24.7 MMOL/L (ref 22–29)
CREAT SERPL-MCNC: 1.04 MG/DL (ref 0.76–1.27)
CRP SERPL-MCNC: 2.4 MG/DL (ref 0–0.5)
D DIMER PPP FEU-MCNC: 1.9 MCGFEU/ML (ref 0–0.49)
DEPRECATED RDW RBC AUTO: 43.5 FL (ref 37–54)
EOSINOPHIL # BLD AUTO: 0.06 10*3/MM3 (ref 0–0.4)
EOSINOPHIL NFR BLD AUTO: 1 % (ref 0.3–6.2)
ERYTHROCYTE [DISTWIDTH] IN BLOOD BY AUTOMATED COUNT: 12.5 % (ref 12.3–15.4)
FERRITIN SERPL-MCNC: 1440 NG/ML (ref 30–400)
GFR SERPL CREATININE-BSD FRML MDRD: 69 ML/MIN/1.73
GLOBULIN UR ELPH-MCNC: 3.3 GM/DL
GLUCOSE SERPL-MCNC: 86 MG/DL (ref 65–99)
HCT VFR BLD AUTO: 36.3 % (ref 37.5–51)
HGB BLD-MCNC: 12 G/DL (ref 13–17.7)
IMM GRANULOCYTES # BLD AUTO: 0.06 10*3/MM3 (ref 0–0.05)
IMM GRANULOCYTES NFR BLD AUTO: 1 % (ref 0–0.5)
LYMPHOCYTES # BLD AUTO: 1.35 10*3/MM3 (ref 0.7–3.1)
LYMPHOCYTES NFR BLD AUTO: 22.5 % (ref 19.6–45.3)
MAGNESIUM SERPL-MCNC: 2.5 MG/DL (ref 1.6–2.4)
MCH RBC QN AUTO: 31.2 PG (ref 26.6–33)
MCHC RBC AUTO-ENTMCNC: 33.1 G/DL (ref 31.5–35.7)
MCV RBC AUTO: 94.3 FL (ref 79–97)
MONOCYTES # BLD AUTO: 0.45 10*3/MM3 (ref 0.1–0.9)
MONOCYTES NFR BLD AUTO: 7.5 % (ref 5–12)
NEUTROPHILS NFR BLD AUTO: 4.05 10*3/MM3 (ref 1.7–7)
NEUTROPHILS NFR BLD AUTO: 67.7 % (ref 42.7–76)
NRBC BLD AUTO-RTO: 0.2 /100 WBC (ref 0–0.2)
PLATELET # BLD AUTO: 332 10*3/MM3 (ref 140–450)
PMV BLD AUTO: 10.8 FL (ref 6–12)
POTASSIUM SERPL-SCNC: 3.7 MMOL/L (ref 3.5–5.2)
PROT SERPL-MCNC: 6.1 G/DL (ref 6–8.5)
RBC # BLD AUTO: 3.85 10*6/MM3 (ref 4.14–5.8)
SODIUM SERPL-SCNC: 140 MMOL/L (ref 136–145)
WBC # BLD AUTO: 5.99 10*3/MM3 (ref 3.4–10.8)

## 2021-09-07 PROCEDURE — 36415 COLL VENOUS BLD VENIPUNCTURE: CPT | Performed by: INTERNAL MEDICINE

## 2021-09-07 PROCEDURE — 25010000002 AMPICILLIN PER 500 MG: Performed by: INTERNAL MEDICINE

## 2021-09-07 PROCEDURE — 85379 FIBRIN DEGRADATION QUANT: CPT | Performed by: INTERNAL MEDICINE

## 2021-09-07 PROCEDURE — 82728 ASSAY OF FERRITIN: CPT | Performed by: INTERNAL MEDICINE

## 2021-09-07 PROCEDURE — 85025 COMPLETE CBC W/AUTO DIFF WBC: CPT | Performed by: INTERNAL MEDICINE

## 2021-09-07 PROCEDURE — 86140 C-REACTIVE PROTEIN: CPT | Performed by: INTERNAL MEDICINE

## 2021-09-07 PROCEDURE — 63710000001 DEXAMETHASONE PER 0.25 MG: Performed by: INTERNAL MEDICINE

## 2021-09-07 PROCEDURE — 25010000002 ENOXAPARIN PER 10 MG: Performed by: INTERNAL MEDICINE

## 2021-09-07 PROCEDURE — 83735 ASSAY OF MAGNESIUM: CPT | Performed by: INTERNAL MEDICINE

## 2021-09-07 PROCEDURE — 80053 COMPREHEN METABOLIC PANEL: CPT | Performed by: INTERNAL MEDICINE

## 2021-09-07 RX ORDER — OLANZAPINE 7.5 MG/1
7.5 TABLET ORAL NIGHTLY
Status: DISCONTINUED | OUTPATIENT
Start: 2021-09-07 | End: 2021-09-15 | Stop reason: HOSPADM

## 2021-09-07 RX ADMIN — SODIUM CHLORIDE, PRESERVATIVE FREE 3 ML: 5 INJECTION INTRAVENOUS at 08:21

## 2021-09-07 RX ADMIN — AMPICILLIN 1 G: 2 INJECTION, POWDER, FOR SOLUTION INTRAMUSCULAR; INTRAVENOUS at 18:16

## 2021-09-07 RX ADMIN — AMPICILLIN 1 G: 2 INJECTION, POWDER, FOR SOLUTION INTRAMUSCULAR; INTRAVENOUS at 06:12

## 2021-09-07 RX ADMIN — OLANZAPINE 7.5 MG: 7.5 TABLET ORAL at 20:40

## 2021-09-07 RX ADMIN — PANTOPRAZOLE SODIUM 40 MG: 40 TABLET, DELAYED RELEASE ORAL at 06:12

## 2021-09-07 RX ADMIN — AMPICILLIN 1 G: 2 INJECTION, POWDER, FOR SOLUTION INTRAMUSCULAR; INTRAVENOUS at 00:05

## 2021-09-07 RX ADMIN — SODIUM CHLORIDE, PRESERVATIVE FREE 3 ML: 5 INJECTION INTRAVENOUS at 20:40

## 2021-09-07 RX ADMIN — ENOXAPARIN SODIUM 40 MG: 40 INJECTION SUBCUTANEOUS at 08:21

## 2021-09-07 RX ADMIN — DEXAMETHASONE 6 MG: 4 TABLET ORAL at 08:21

## 2021-09-07 RX ADMIN — AMPICILLIN 1 G: 2 INJECTION, POWDER, FOR SOLUTION INTRAMUSCULAR; INTRAVENOUS at 12:48

## 2021-09-07 NOTE — DISCHARGE PLACEMENT REQUEST
"Young Kan (76 y.o. Male)     Date of Birth Social Security Number Address Home Phone MRN    1945  [id number] [facility]  [place] [facility] [phone] [id number]    Yazidi Marital Status          None Single       Admission Date Admission Type Admitting Provider Attending Provider Department, Room/Bed    9/2/21 Emergency Benjamin Loera MD Lykins, Matthew D, MD [address], [address]    Discharge Date Discharge Disposition Discharge Destination                       Attending Provider: Frandy Linn MD    Allergies: No Known Allergies    Isolation: Enh Drop/Con   Infection: COVID (confirmed) (08/22/21)   Code Status: CPR    Ht: 180.3 cm (71\")   Wt: 65.8 kg (145 lb 1.6 oz)    Admission Cmt: None   Principal Problem: Pneumonia due to COVID-19 virus [U07.1,J12.82]                 Active Insurance as of 9/2/2021     Primary Coverage     Payor Plan Insurance Group Employer/Plan Group    MEDICARE MEDICARE A & B      Payor Plan Address Payor Plan Phone Number Payor Plan Fax Number Effective Dates    [address] [phone]  3/1/2008 - None Entered    [place] [address]       Subscriber Name Subscriber Birth Date Member ID       YOUNG KAN 1945 [id number]                 Emergency Contacts      (Rel.) Home Phone Work Phone Mobile Phone    Rayray Kan (Brother) [phone] -- --    Kary Charles (Friend) [phone] -- [phone]            {Outbreak/Travel/Exposure Documentation......;  Question Available Choices Patient Response   COVID-19 Outbreak Screen:  Do you currently have a new onset of the following symptoms?        Fever/Chills, Cough, Shortness of air, Loss of taste or smell, No, Unknown  Unknown (09/03/21 0233)   COVID-19 Outbreak Screen: In the last 14 days, have you had contact with anyone who is ill, has show any of the symptoms listed above and/or has been diagnosis with the 2019 Novel Coronavirus? This includes any " immediate household members but excludes any patients with whom you have been in contact within your normal work duties wearing proper PPE, if you are a healthcare worker.  Yes, No, Unknown              Unknown (09/03/21 0233)   COVID-19 Outbreak Screen: Who was notified? Free text (not recorded)   Ebola Screening Outbreak Screen: Have you traveled to the [place] or [place] within the past 21 days?  Yes, No, Unknown (not recorded)   Ebola Screening Outbreak Screen: Do you have ANY of the following symptoms: Fever/Chills, Vomiting, Diarrhea, Fatigue, Headache, Muscle pain, Unexplained bleeding, Abdominal (stomach) pain, No, Unknown (not recorded)   Ebola Screening Outbreak Screen: Name of Person notified Free text (not recorded)   Travel Screen: Have you traveled in the last month? If so, to what country have you traveled? If [place] what state? Yes, No, Unknown  List of all countries  List of all States No (09/03/21 0233)  (not recorded)  (not recorded)   Infection Risk: Do you currently have the following symptoms?  (If cough is selected, the Tuberculosis Screen is performed.) Cough, Fever, Rash, No No (09/03/21 0233)   Tuberculosis Screen: Do you have any of the following Tuberculosis Risks?  · Have you lived or spent time with anyone who had or may have TB?  · Have you lived in or visited any of the following areas for more than one month: [place], [place], [place], [place] or [place], the [place] or [place]?  · Do you have HIV/AIDS?  · Have you lived in or worked in a nursing home, homeless shelter, correctional facility, or substance abuse treatment facility?   · No    If Yes do you have any of the following symptoms? Yes responses display to the right    If Yes, symptoms listed are:  Cough greater than or equal to 3 weeks, Loss of appetite, Unexplained weight loss, Night sweats, Bloody sputum or hemoptysis, Hoarseness, Fever, Fatigue, Chest pain, No (not recorded)  (not recorded)    Exposure Screen: Have you been exposed to any of these contagious diseases in the last month? Measles, Chickenpox, Meningitis, Pertussis, Whooping Cough, No No (09/03/21 8078)

## 2021-09-07 NOTE — PLAN OF CARE
Goal Outcome Evaluation:  Plan of Care Reviewed With: patient        Progress: no change  Outcome Summary: Remains in posey vest and wrist restraints.  Confused.  Bed alarm in use.  Sats WNL on RA.  Fidgity in bed, pulling against restraints.

## 2021-09-07 NOTE — PROGRESS NOTES
Name: Jake Kan ADMIT: 2021   : 1945  PCP: Sree Thomas MD    MRN: [id number] LOS: 5 days   AGE/SEX: 76 y.o. male  ROOM: [facility]     Subjective   Subjective   CC: confusion  No acute events. Patient has no new complaints. Dyspnea is unchanged. Cough is non-productive. Taking PO. No CP/f/c/n/v/d. He has been confused and very impulsive, requiring restraints. He did sleep a few hours overnight.    Objective   Objective   Vital Signs  Temp:  [97.1 °F (36.2 °C)-99 °F (37.2 °C)] 97.4 °F (36.3 °C)  Heart Rate:  [43-71] 71  Resp:  [16-18] 16  BP: (120-157)/(61-94) 157/82  SpO2:  [89 %-97 %] 94 %  on   ;   Device (Oxygen Therapy): room air  Body mass index is 20.24 kg/m².  Physical Exam  Vitals and nursing note reviewed.   Constitutional:       General: He is not in acute distress.     Appearance: He is ill-appearing. He is not toxic-appearing or diaphoretic.   HENT:      Head: Normocephalic and atraumatic.      Nose: Nose normal.      Mouth/Throat:      Mouth: Mucous membranes are moist.      Pharynx: Oropharynx is clear.   Eyes:      Extraocular Movements: Extraocular movements intact.      Conjunctiva/sclera: Conjunctivae normal.      Pupils: Pupils are equal, round, and reactive to light.   Cardiovascular:      Rate and Rhythm: Normal rate and regular rhythm.      Pulses: Normal pulses.   Pulmonary:      Effort: Pulmonary effort is normal.      Breath sounds: Examination of the right-lower field reveals decreased breath sounds. Examination of the left-lower field reveals decreased breath sounds. Decreased breath sounds present.   Abdominal:      General: Bowel sounds are normal.      Palpations: Abdomen is soft.   Musculoskeletal:         General: No swelling or tenderness.      Cervical back: Normal range of motion and neck supple.   Skin:     General: Skin is warm and dry.      Capillary Refill: Capillary refill takes less than 2 seconds.   Neurological:      General: No focal deficit present.       Mental Status: He is alert.   Psychiatric:         Mood and Affect: Mood normal.         Behavior: Behavior normal.         Cognition and Memory: Cognition is impaired.     Results Review     I reviewed the patient's new clinical results.  I reviewed the patient's telemetry  Results from last 7 days   Lab Units 09/07/21 0455 09/06/21 0707 09/05/21 0712 09/04/21  0613   WBC 10*3/mm3 5.99 5.91 6.92 6.81   HEMOGLOBIN g/dL 12.0* 11.8* 12.3* 11.5*   PLATELETS 10*3/mm3 332 324 327 297     Results from last 7 days   Lab Units 09/07/21 0455 09/06/21 0707 09/05/21 0712 09/04/21  0613   SODIUM mmol/L 140 142 140 139   POTASSIUM mmol/L 3.7 3.7 3.8 3.8   CHLORIDE mmol/L 109* 111* 110* 109*   CO2 mmol/L 24.7 21.6* 22.3 20.3*   BUN mg/dL 22 23 29* 42*   CREATININE mg/dL 1.04 0.97 1.01 1.04   GLUCOSE mg/dL 86 92 92 115*   Estimated Creatinine Clearance: 54.5 mL/min (by C-G formula based on SCr of 1.04 mg/dL).  Results from last 7 days   Lab Units 09/07/21 0455 09/06/21 0707 09/05/21  0712 09/04/21  0613   ALBUMIN g/dL 2.80* 2.80* 3.00* 2.50*   BILIRUBIN mg/dL 0.9 0.9 1.0 1.1   ALK PHOS U/L 51 50 52 52   AST (SGOT) U/L 43* 29 21 17   ALT (SGPT) U/L 43* 27 19 18     Results from last 7 days   Lab Units 09/07/21 0455 09/06/21 0707 09/05/21  0712 09/04/21  0613   CALCIUM mg/dL 8.3* 8.4* 8.6 8.3*   ALBUMIN g/dL 2.80* 2.80* 3.00* 2.50*   MAGNESIUM mg/dL 2.5* 2.5* 2.6* 2.8*     Results from last 7 days   Lab Units 09/03/21  0006 09/02/21 2050   PROCALCITONIN ng/mL  --  0.22   LACTATE mmol/L 1.5 2.2*     SARS-CoV-2, SURAJ   Date Value Ref Range Status   08/21/2021 Detected (A) Not Detected Final     Comment:     Patients who have a positive COVID-19 test result may now have  treatment options. Treatment options are available for patients  with mild to moderate symptoms and for hospitalized patients.  Visit our website at [email or web address] for  resources and information.  This nucleic acid amplification test was  developed and its performance  characteristics determined by [facility]. Nucleic acid  amplification tests include RT-PCR and TMA. This test has not been  FDA cleared or approved. This test has been authorized by FDA under  an Emergency Use Authorization (EUA). This test is only authorized  for the duration of time the declaration that circumstances exist  justifying the authorization of the emergency use of in vitro  diagnostic tests for detection of SARS-CoV-2 virus and/or diagnosis  of COVID-19 infection under section 564(b)(1) of the Act, 21 U.S.C.  360bbb-3(b) (1), unless the authorization is terminated or revoked  sooner.  When diagnostic testing is negative, the possibility of a false  negative result should be considered in the context of a patient's  recent exposures and the presence of clinical signs and symptoms  consistent with COVID-19. An individual without symptoms of COVID-19  and who is not shedding SARS-CoV-2 virus would expect to have a  negative (not detected) result in this assay.     No results found for: HGBA1C, POCGLU    NM Lung Scan Perfusion Particulate  Narrative: NM LUNG SCAN PERFUSION PARTICULATE-     CLINICAL HISTORY: Acute onset chest pain. Positive d-dimer.     TECHNIQUE: The patient was injected with 5.5 mCi of technetium MAA.  Anterior, posterior, lateral and oblique views of the lungs were  acquired.      COMPARISON: None     FINDINGS: There is homogeneous distribution of the radiopharmaceutical  within both lungs. No defects suspicious for pulmonary embolism are  identified.     Impression: Normal perfusion lung scan.     This report was finalized on 9/4/2021 10:08 AM by Dr. Primitivo Viera M.D.       Scheduled Medications  ampicillin 1 g IVPB in 100 mL NS, 1 g, Intravenous, Q6H  dexamethasone, 6 mg, Oral, Daily With Breakfast  enoxaparin, 40 mg, Subcutaneous, Q24H  OLANZapine, 7.5 mg, Oral, Nightly  pantoprazole, 40 mg, Oral, QAM  sodium chloride, 3 mL, Intravenous,  Q12H    Infusions  Pharmacy Consult - Pharmacy to dose,     Diet  Diet Regular; Nectar / Syrup Thick       Assessment/Plan     Active Hospital Problems    Diagnosis  POA   • **Pneumonia due to COVID-19 virus [U07.1, J12.82]  Yes   • Metabolic encephalopathy [G93.41]  Yes   • Dehydration [E86.0]  Yes   • Hypernatremia [E87.0]  Yes   • ANA (acute kidney injury) (CMS/HCC) [N17.9]  Yes   • Stage 3a chronic kidney disease (CMS/HCC) [N18.31]  Yes   • Positive D-dimer [R79.89]  Yes   • Acute cystitis [N30.00]  Yes   • Hypoxia [R09.02]  Yes   • Debility [R53.81]  Yes      Resolved Hospital Problems   No resolved problems to display.   COVID-19 Pneumonia with Hypoxia  - diagnosed 8/21/21, patient is unvaccinated  - hypoxic on admission-this is improved  - continue decadron  - monitor inflammatory markers, LFTs, and renal function  - has elevated d-dimer, will follow-BLE venous dopplers are negative-cannot have CTA chest due to renal function, perfusion scan is normal  - encourage pulmonary toilet and wean oxygen as tolerated    ANA on Stage 3a CKD  - likely pre-renal due to dehydration, also has hypernatremia  - Cr and sodium have improved, no e/o urine retention    UTI  - outpatient urine culture grew enterococcus, PCN sensitive  - finish course of ampicillin    Metabolic Encephalopathy  - probably due to above issues, no focal deficits  - monitor with above treatment and redirect as needed  - has been quite restless which is likely causing some delirium on top of this-will increase nightly zyprexa      Lovenox 40 mg SC daily for DVT prophylaxis.  Full code.  Discussed with patient, nursing staff and care team on multidisciplinary rounds.  Anticipate discharge TBD in 1-2 days.      Frandy Linn MD  [facility]  09/07/21  17:09 EDT

## 2021-09-07 NOTE — CASE MANAGEMENT/SOCIAL WORK
Discharge Planning Assessment  [facility]     Patient Name: Jake Kan  MRN: [id number]  Today's Date: 9/7/2021    Admit Date: 9/2/2021    Discharge Needs Assessment     Row Name 09/07/21 1342       Living Environment    Lives With  friend(s)    Name(s) of Who Lives With Patient  His friend Kary    Current Living Arrangements  home/apartment/condo    Primary Care Provided by  self    Provides Primary Care For  no one    Family Caregiver if Needed  sibling(s)    Quality of Family Relationships  helpful;supportive    Able to Return to Prior Arrangements  yes       Resource/Environmental Concerns    Resource/Environmental Concerns  none    Transportation Concerns  car, none       Transition Planning    Patient/Family Anticipates Transition to  inpatient rehabilitation facility    Patient/Family Anticipated Services at Transition      Transportation Anticipated  health plan transportation       Discharge Needs Assessment    Readmission Within the Last 30 Days  no previous admission in last 30 days    Equipment Currently Used at Home  none    Concerns to be Addressed  discharge planning    Anticipated Changes Related to Illness  inability to care for self    Equipment Needed After Discharge  none    Outpatient/Agency/Support Group Needs  skilled nursing facility    Discharge Facility/Level of Care Needs  nursing facility, skilled    Provided Post Acute Provider List?  Yes    Post Acute Provider List  Nursing Home    Provided Post Acute Provider Quality & Resource List?  Refused    Delivered To  Support Person    Support Person  Asadpratik Rayray    Method of Delivery  Telephone        Discharge Plan     Row Name 09/07/21 1344       Plan    Plan  SNF, referrals pending    Patient/Family in Agreement with Plan  yes    Plan Comments  Outbound call to patient's brother Rayray. Explained CCP role and verified face sheet. Patient has a home in [place] but stays with his friend Kary at her house or they will  stay at his house.  There are no steps at his home. Rayray is unsure of patient's mental status bc they have not seen him in about a year. Explained the patient will need some rehab when stable for DC from the hospital and that he can not return to Kary's home per Kary's daughter. Rayray verbalized understanding and was agreeable to referrals to [facility] and [facility]. Referrals placed in [facility]. Patient will need transportation at [place]. Partial packet in CCP office. Melissa Tobar RN [facility]        Continued Care and Services - Admitted Since 9/2/2021     Destination     Service Provider Request Status Selected Services Address Phone Fax Patient Preferred    [facility]  P[facility] - [facility] N[facility]A [id number] [facility] [address] [id number] [phone] --    [facility]  P[facility] - [facility] N/A [address] [facility] [id number] [phone] --              Expected Discharge Date and Time     Expected Discharge Date Expected Discharge Time    Sep 7, 2021         Demographic Summary     Row Name 09/07/21 1339       General Information    Admission Type  inpatient    Arrived From  emergency department    Referral Source  admission list    Reason for Consult  discharge planning    Preferred Language  English       Contact Information    Permission Granted to Share Info With  family/designee        Functional Status     Row Name 09/07/21 1341       Functional Status    Usual Activity Tolerance  moderate    Current Activity Tolerance  poor        Psychosocial     Row Name 09/07/21 1342       Developmental Stage (Eriksson's)    Developmental Stage  Stage 8 (63 years-death/Late Adulthood) Integrity vs. Despair        Abuse/Neglect    No documentation.       Legal    No documentation.       Substance Abuse    No documentation.       Patient Forms    No documentation.           Melissa Tobar RN

## 2021-09-08 LAB
TSH SERPL DL<=0.05 MIU/L-ACNC: 1.65 UIU/ML (ref 0.27–4.2)
VIT B12 BLD-MCNC: 467 PG/ML (ref 211–946)

## 2021-09-08 PROCEDURE — 63710000001 DEXAMETHASONE PER 0.25 MG: Performed by: INTERNAL MEDICINE

## 2021-09-08 PROCEDURE — 84443 ASSAY THYROID STIM HORMONE: CPT | Performed by: INTERNAL MEDICINE

## 2021-09-08 PROCEDURE — 82607 VITAMIN B-12: CPT | Performed by: INTERNAL MEDICINE

## 2021-09-08 PROCEDURE — 25010000002 ENOXAPARIN PER 10 MG: Performed by: INTERNAL MEDICINE

## 2021-09-08 RX ADMIN — DEXAMETHASONE 6 MG: 4 TABLET ORAL at 08:10

## 2021-09-08 RX ADMIN — OLANZAPINE 7.5 MG: 7.5 TABLET ORAL at 20:11

## 2021-09-08 RX ADMIN — SODIUM CHLORIDE, PRESERVATIVE FREE 3 ML: 5 INJECTION INTRAVENOUS at 20:11

## 2021-09-08 RX ADMIN — ENOXAPARIN SODIUM 40 MG: 40 INJECTION SUBCUTANEOUS at 08:10

## 2021-09-08 RX ADMIN — PANTOPRAZOLE SODIUM 40 MG: 40 TABLET, DELAYED RELEASE ORAL at 08:10

## 2021-09-08 RX ADMIN — SODIUM CHLORIDE, PRESERVATIVE FREE 3 ML: 5 INJECTION INTRAVENOUS at 08:11

## 2021-09-08 NOTE — PLAN OF CARE
Goal Outcome Evaluation:  Plan of Care Reviewed With: patient        Progress: no change  Outcome Summary: posey taken out of posey vest during dayshift, remains in soft wrist restraints, q2h assessment and turns, agitated and restless at beginning of shift, pulling at restraints, resting comfortably at this time, voiding per brief, WCTM

## 2021-09-08 NOTE — CASE MANAGEMENT/SOCIAL WORK
Continued Stay Note  [facility]     Patient Name: Jake Kan  MRN: [id number]  Today's Date: 9/8/2021    Admit Date: 9/2/2021    Discharge Plan     Row Name 09/08/21 1439       Plan    Plan Comments  Sent message to Lili to check the status of referral. Melissa Tobar RN CCP        Discharge Codes    No documentation.       Expected Discharge Date and Time     Expected Discharge Date Expected Discharge Time    Sep 10, 2021             Melissa Tobar RN

## 2021-09-08 NOTE — PROGRESS NOTES
Name: Jake Kan ADMIT: 2021   : 1945  PCP: Sree Thomas MD    MRN: [id number] LOS: 6 days   AGE/SEX: 76 y.o. male  ROOM: [facility]     Subjective   Subjective   CC: confusion  No acute events. Patient has no new complaints. Dyspnea is unchanged. Cough is non-productive. Taking PO. No CP/f/c/n/v/d. Confusion continues. He has been very impulsive, requiring restraints. Slept a little better overnight.    Objective   Objective   Vital Signs  Temp:  [95.7 °F (35.4 °C)-97.4 °F (36.3 °C)] 97.3 °F (36.3 °C)  Heart Rate:  [52-72] 52  Resp:  [16-22] 20  BP: (138-171)/(67-96) 145/76  SpO2:  [93 %-96 %] 95 %  on   ;   Device (Oxygen Therapy): room air  Body mass index is 20.24 kg/m².  Physical Exam  Vitals and nursing note reviewed.   Constitutional:       General: He is not in acute distress.     Appearance: He is ill-appearing. He is not toxic-appearing or diaphoretic.   HENT:      Head: Normocephalic and atraumatic.      Nose: Nose normal.      Mouth/Throat:      Mouth: Mucous membranes are moist.      Pharynx: Oropharynx is clear.   Eyes:      Extraocular Movements: Extraocular movements intact.      Conjunctiva/sclera: Conjunctivae normal.      Pupils: Pupils are equal, round, and reactive to light.   Cardiovascular:      Rate and Rhythm: Normal rate and regular rhythm.      Pulses: Normal pulses.   Pulmonary:      Effort: Pulmonary effort is normal.      Breath sounds: Examination of the right-lower field reveals decreased breath sounds. Examination of the left-lower field reveals decreased breath sounds. Decreased breath sounds present.   Abdominal:      General: Bowel sounds are normal.      Palpations: Abdomen is soft.   Musculoskeletal:         General: No swelling or tenderness.      Cervical back: Normal range of motion and neck supple.   Skin:     General: Skin is warm and dry.      Capillary Refill: Capillary refill takes less than 2 seconds.   Neurological:      General: No focal deficit  present.      Mental Status: He is alert.   Psychiatric:         Mood and Affect: Mood normal.         Behavior: Behavior normal.         Cognition and Memory: Cognition is impaired.     Results Review     I reviewed the patient's new clinical results.  I reviewed the patient's telemetry  Results from last 7 days   Lab Units 09/07/21 0455 09/06/21 0707 09/05/21 0712 09/04/21  0613   WBC 10*3/mm3 5.99 5.91 6.92 6.81   HEMOGLOBIN g/dL 12.0* 11.8* 12.3* 11.5*   PLATELETS 10*3/mm3 332 324 327 297     Results from last 7 days   Lab Units 09/07/21 0455 09/06/21 0707 09/05/21  0712 09/04/21  0613   SODIUM mmol/L 140 142 140 139   POTASSIUM mmol/L 3.7 3.7 3.8 3.8   CHLORIDE mmol/L 109* 111* 110* 109*   CO2 mmol/L 24.7 21.6* 22.3 20.3*   BUN mg/dL 22 23 29* 42*   CREATININE mg/dL 1.04 0.97 1.01 1.04   GLUCOSE mg/dL 86 92 92 115*   Estimated Creatinine Clearance: 54.5 mL/min (by C-G formula based on SCr of 1.04 mg/dL).  Results from last 7 days   Lab Units 09/07/21 0455 09/06/21 0707 09/05/21  0712 09/04/21  0613   ALBUMIN g/dL 2.80* 2.80* 3.00* 2.50*   BILIRUBIN mg/dL 0.9 0.9 1.0 1.1   ALK PHOS U/L 51 50 52 52   AST (SGOT) U/L 43* 29 21 17   ALT (SGPT) U/L 43* 27 19 18     Results from last 7 days   Lab Units 09/07/21 0455 09/06/21  0707 09/05/21  0712 09/04/21  0613   CALCIUM mg/dL 8.3* 8.4* 8.6 8.3*   ALBUMIN g/dL 2.80* 2.80* 3.00* 2.50*   MAGNESIUM mg/dL 2.5* 2.5* 2.6* 2.8*     Results from last 7 days   Lab Units 09/03/21  0006 09/02/21 2050   PROCALCITONIN ng/mL  --  0.22   LACTATE mmol/L 1.5 2.2*     SARS-CoV-2, SURAJ   Date Value Ref Range Status   08/21/2021 Detected (A) Not Detected Final     Comment:     Patients who have a positive COVID-19 test result may now have  treatment options. Treatment options are available for patients  with mild to moderate symptoms and for hospitalized patients.  Visit our website at [email or web address] for  resources and information.  This nucleic acid amplification  test was developed and its performance  characteristics determined by [employer]. Nucleic acid  amplification tests include RT-PCR and TMA. This test has not been  FDA cleared or approved. This test has been authorized by FDA under  an Emergency Use Authorization (EUA). This test is only authorized  for the duration of time the declaration that circumstances exist  justifying the authorization of the emergency use of in vitro  diagnostic tests for detection of SARS-CoV-2 virus and/or diagnosis  of COVID-19 infection under section 564(b)(1) of the Act, 21 U.S.C.  360bbb-3(b) (1), unless the authorization is terminated or revoked  sooner.  When diagnostic testing is negative, the possibility of a false  negative result should be considered in the context of a patient's  recent exposures and the presence of clinical signs and symptoms  consistent with COVID-19. An individual without symptoms of COVID-19  and who is not shedding SARS-CoV-2 virus would expect to have a  negative (not detected) result in this assay.     No results found for: HGBA1C, POCGLU    NM Lung Scan Perfusion Particulate  Narrative: NM LUNG SCAN PERFUSION PARTICULATE-     CLINICAL HISTORY: Acute onset chest pain. Positive d-dimer.     TECHNIQUE: The patient was injected with 5.5 mCi of technetium MAA.  Anterior, posterior, lateral and oblique views of the lungs were  acquired.      COMPARISON: None     FINDINGS: There is homogeneous distribution of the radiopharmaceutical  within both lungs. No defects suspicious for pulmonary embolism are  identified.     Impression: Normal perfusion lung scan.     This report was finalized on 9/4/2021 10:08 AM by Dr. Primitivo Viera M.D.       Scheduled Medications  dexamethasone, 6 mg, Oral, Daily With Breakfast  enoxaparin, 40 mg, Subcutaneous, Q24H  OLANZapine, 7.5 mg, Oral, Nightly  pantoprazole, 40 mg, Oral, QAM  sodium chloride, 3 mL, Intravenous, Q12H    Infusions   Diet  Diet Regular; Nectar /  Syrup Thick       Assessment/Plan     Active Hospital Problems    Diagnosis  POA   • **Pneumonia due to COVID-19 virus [U07.1, J12.82]  Yes   • Metabolic encephalopathy [G93.41]  Yes   • Dehydration [E86.0]  Yes   • Hypernatremia [E87.0]  Yes   • ANA (acute kidney injury) (CMS/HCC) [N17.9]  Yes   • Stage 3a chronic kidney disease (CMS/HCC) [N18.31]  Yes   • Positive D-dimer [R79.89]  Yes   • Acute cystitis [N30.00]  Yes   • Hypoxia [R09.02]  Yes   • Debility [R53.81]  Yes      Resolved Hospital Problems   No resolved problems to display.   COVID-19 Pneumonia with Hypoxia  - diagnosed 8/21/21, patient is unvaccinated  - hypoxic on admission-this is improved  - continue decadron  - monitor inflammatory markers, LFTs, and renal function  - has elevated d-dimer, will follow-BLE venous dopplers are negative-cannot have CTA chest due to renal function, perfusion scan is normal  - encourage pulmonary toilet-he is on room air now    ANA on Stage 3a CKD  - likely pre-renal due to dehydration, also has hypernatremia  - Cr and sodium have improved, no e/o urine retention    UTI  - outpatient urine culture grew enterococcus, PCN sensitive  - finished course of ampicillin    Metabolic Encephalopathy/Delirium  - probably due to above issues, no focal deficits  - monitor with above treatment and redirect as needed  - has been quite restless which is likely causing some delirium on top of this-continue nightly zyprexa; hopefully will improve once off of steroids  - I suspect some dementia at baseline but unfortunately baseline is unknown-check head CT; B12 and TSH are okay      Lovenox 40 mg SC daily for DVT prophylaxis.  Full code.  Discussed with patient, nursing staff and care team on multidisciplinary rounds.  Anticipate discharge to SNU facility in 1-2 days.      Frandy Linn MD  [facility]  09/08/21  16:06 EDT

## 2021-09-09 ENCOUNTER — APPOINTMENT (OUTPATIENT)
Dept: CT IMAGING | Facility: HOSPITAL | Age: 76
End: 2021-09-09

## 2021-09-09 LAB
ALBUMIN SERPL-MCNC: 3.4 G/DL (ref 3.5–5.2)
ALBUMIN/GLOB SERPL: 1 G/DL
ALP SERPL-CCNC: 73 U/L (ref 39–117)
ALT SERPL W P-5'-P-CCNC: 70 U/L (ref 1–41)
ANION GAP SERPL CALCULATED.3IONS-SCNC: 11.3 MMOL/L (ref 5–15)
AST SERPL-CCNC: 41 U/L (ref 1–40)
BASOPHILS # BLD AUTO: 0.02 10*3/MM3 (ref 0–0.2)
BASOPHILS NFR BLD AUTO: 0.2 % (ref 0–1.5)
BILIRUB SERPL-MCNC: 1 MG/DL (ref 0–1.2)
BUN SERPL-MCNC: 28 MG/DL (ref 8–23)
BUN/CREAT SERPL: 26.7 (ref 7–25)
CALCIUM SPEC-SCNC: 8.9 MG/DL (ref 8.6–10.5)
CHLORIDE SERPL-SCNC: 108 MMOL/L (ref 98–107)
CO2 SERPL-SCNC: 22.7 MMOL/L (ref 22–29)
CREAT SERPL-MCNC: 1.05 MG/DL (ref 0.76–1.27)
D DIMER PPP FEU-MCNC: 1.66 MCGFEU/ML (ref 0–0.49)
DEPRECATED RDW RBC AUTO: 41.4 FL (ref 37–54)
EOSINOPHIL # BLD AUTO: 0.01 10*3/MM3 (ref 0–0.4)
EOSINOPHIL NFR BLD AUTO: 0.1 % (ref 0.3–6.2)
ERYTHROCYTE [DISTWIDTH] IN BLOOD BY AUTOMATED COUNT: 12.6 % (ref 12.3–15.4)
GFR SERPL CREATININE-BSD FRML MDRD: 68 ML/MIN/1.73
GLOBULIN UR ELPH-MCNC: 3.5 GM/DL
GLUCOSE SERPL-MCNC: 106 MG/DL (ref 65–99)
HCT VFR BLD AUTO: 43 % (ref 37.5–51)
HGB BLD-MCNC: 14.5 G/DL (ref 13–17.7)
IMM GRANULOCYTES # BLD AUTO: 0.04 10*3/MM3 (ref 0–0.05)
IMM GRANULOCYTES NFR BLD AUTO: 0.5 % (ref 0–0.5)
LYMPHOCYTES # BLD AUTO: 1 10*3/MM3 (ref 0.7–3.1)
LYMPHOCYTES NFR BLD AUTO: 12.5 % (ref 19.6–45.3)
MCH RBC QN AUTO: 30.8 PG (ref 26.6–33)
MCHC RBC AUTO-ENTMCNC: 33.7 G/DL (ref 31.5–35.7)
MCV RBC AUTO: 91.3 FL (ref 79–97)
MONOCYTES # BLD AUTO: 0.34 10*3/MM3 (ref 0.1–0.9)
MONOCYTES NFR BLD AUTO: 4.2 % (ref 5–12)
NEUTROPHILS NFR BLD AUTO: 6.6 10*3/MM3 (ref 1.7–7)
NEUTROPHILS NFR BLD AUTO: 82.5 % (ref 42.7–76)
NRBC BLD AUTO-RTO: 0 /100 WBC (ref 0–0.2)
PLATELET # BLD AUTO: 411 10*3/MM3 (ref 140–450)
PMV BLD AUTO: 10.5 FL (ref 6–12)
POTASSIUM SERPL-SCNC: 4.2 MMOL/L (ref 3.5–5.2)
PROT SERPL-MCNC: 6.9 G/DL (ref 6–8.5)
RBC # BLD AUTO: 4.71 10*6/MM3 (ref 4.14–5.8)
SODIUM SERPL-SCNC: 142 MMOL/L (ref 136–145)
WBC # BLD AUTO: 8.01 10*3/MM3 (ref 3.4–10.8)

## 2021-09-09 PROCEDURE — 97110 THERAPEUTIC EXERCISES: CPT

## 2021-09-09 PROCEDURE — 85025 COMPLETE CBC W/AUTO DIFF WBC: CPT | Performed by: HOSPITALIST

## 2021-09-09 PROCEDURE — 36415 COLL VENOUS BLD VENIPUNCTURE: CPT | Performed by: INTERNAL MEDICINE

## 2021-09-09 PROCEDURE — 63710000001 DEXAMETHASONE PER 0.25 MG: Performed by: INTERNAL MEDICINE

## 2021-09-09 PROCEDURE — 80053 COMPREHEN METABOLIC PANEL: CPT | Performed by: HOSPITALIST

## 2021-09-09 PROCEDURE — 25010000002 ENOXAPARIN PER 10 MG: Performed by: INTERNAL MEDICINE

## 2021-09-09 PROCEDURE — 70450 CT HEAD/BRAIN W/O DYE: CPT

## 2021-09-09 PROCEDURE — 85379 FIBRIN DEGRADATION QUANT: CPT | Performed by: INTERNAL MEDICINE

## 2021-09-09 PROCEDURE — 97535 SELF CARE MNGMENT TRAINING: CPT

## 2021-09-09 RX ADMIN — OLANZAPINE 7.5 MG: 7.5 TABLET ORAL at 20:33

## 2021-09-09 RX ADMIN — ENOXAPARIN SODIUM 40 MG: 40 INJECTION SUBCUTANEOUS at 08:47

## 2021-09-09 RX ADMIN — SODIUM CHLORIDE, PRESERVATIVE FREE 3 ML: 5 INJECTION INTRAVENOUS at 12:25

## 2021-09-09 RX ADMIN — PANTOPRAZOLE SODIUM 40 MG: 40 TABLET, DELAYED RELEASE ORAL at 08:47

## 2021-09-09 RX ADMIN — SODIUM CHLORIDE, PRESERVATIVE FREE 10 ML: 5 INJECTION INTRAVENOUS at 20:33

## 2021-09-09 RX ADMIN — DEXAMETHASONE 6 MG: 4 TABLET ORAL at 08:48

## 2021-09-09 NOTE — CASE MANAGEMENT/SOCIAL WORK
Continued Stay Note  [facility]     Patient Name: Jake Kan  MRN: [id number]  Today's Date: 9/9/2021    Admit Date: 9/2/2021    Discharge Plan     Row Name 09/09/21 0829       Plan    Plan Comments  Patient still in bilateral wrist restraints. CCP will continue to follow and assist with DC planning when more appropriate. Melissa Tobar RN CCP        Discharge Codes    No documentation.       Expected Discharge Date and Time     Expected Discharge Date Expected Discharge Time    Sep 10, 2021             Melissa Tobar RN

## 2021-09-09 NOTE — PROGRESS NOTES
[facility]     LOS: 7 days     Subjective:    CC:Altered Mental Status    DIET:  Diet Order   Procedures   • Diet Regular; Nectar / Syrup Thick     Not reliable historian  Ate 25 % of food today  Still in restraints  Objective:    Vital Signs:  Temp:  [96.1 °F (35.6 °C)-98 °F (36.7 °C)] 97.2 °F (36.2 °C)  Heart Rate:  [51-74] 51  Resp:  [20] 20  BP: (109-179)/(70-90) 126/78    SpO2:  [90 %-99 %] 99 %  on   ;   Device (Oxygen Therapy): room air  Body mass index is 20.24 kg/m².    Physical Exam  HENT:      Head: Normocephalic and atraumatic.   Cardiovascular:      Heart sounds: No murmur heard.   No friction rub.   Pulmonary:      Effort: Pulmonary effort is normal.      Breath sounds: Normal breath sounds.   Abdominal:      General: Bowel sounds are normal. There is no distension.      Palpations: Abdomen is soft.      Tenderness: There is no abdominal tenderness.   Skin:     General: Skin is warm and dry.         Results Review:    Glucose   Date Value Ref Range Status   09/07/2021 86 65 - 99 mg/dL Final     Results from last 7 days   Lab Units 09/07/21  0455   WBC 10*3/mm3 5.99   HEMOGLOBIN g/dL 12.0*   HEMATOCRIT % 36.3*   PLATELETS 10*3/mm3 332     Results from last 7 days   Lab Units 09/07/21  0455   SODIUM mmol/L 140   POTASSIUM mmol/L 3.7   CHLORIDE mmol/L 109*   CO2 mmol/L 24.7   BUN mg/dL 22   CREATININE mg/dL 1.04   CALCIUM mg/dL 8.3*   BILIRUBIN mg/dL 0.9   ALK PHOS U/L 51   ALT (SGPT) U/L 43*   AST (SGOT) U/L 43*   GLUCOSE mg/dL 86     Results from last 7 days   Lab Units 09/02/21 2050   INR  1.32*     Results from last 7 days   Lab Units 09/07/21  0455   MAGNESIUM mg/dL 2.5*         Cultures:  Blood Culture   Date Value Ref Range Status   09/02/2021 No growth at 5 days  Final   09/02/2021 No growth at 5 days  Final       I have reviewed daily medications and changes in CPOE    Scheduled meds  dexamethasone, 6 mg, Oral, Daily With Breakfast  enoxaparin, 40 mg, Subcutaneous,  Q24H  OLANZapine, 7.5 mg, Oral, Nightly  pantoprazole, 40 mg, Oral, QAM  sodium chloride, 3 mL, Intravenous, Q12H           PRN meds  •  acetaminophen **OR** acetaminophen **OR** acetaminophen  •  docusate sodium  •  famotidine  •  melatonin  •  nitroglycerin  •  ondansetron **OR** ondansetron  •  [COMPLETED] Insert peripheral IV **AND** sodium chloride  •  sodium chloride        Pneumonia due to COVID-19 virus    Debility    Metabolic encephalopathy    Dehydration    Hypernatremia    ANA (acute kidney injury) (CMS/[facility])    Stage 3a chronic kidney disease (CMS/[facility])    Positive D-dimer    Acute cystitis    Hypoxia        Assessment/Plan:  COVID-19 Pneumonia with Hypoxia  - diagnosed 8/21/21, patient is unvaccinated  - hypoxic on admission-this is improved  - continue decadron  - monitor inflammatory markers, LFTs, and renal function  - has elevated d-dimer, will follow-BLE venous dopplers are negative-cannot have CTA chest due to renal function, perfusion scan is normal  - encourage pulmonary toilet-he is on room air now     ANA on Stage 3a CKD  - likely pre-renal due to dehydration, also has hypernatremia  - Cr and sodium have improved, no e/o urine retention     UTI  - outpatient urine culture grew enterococcus, PCN sensitive  - finished course of ampicillin     Metabolic Encephalopathy/Delirium  - probably due to above issues, no focal deficits  - monitor with above treatment and redirect as needed  - has been quite restless which is likely causing some delirium on top of this-continue nightly zyprexa; hopefully will improve once off of steroids  - I suspect some dementia at baseline but unfortunately baseline is unknown-check head CT neg; B12 and TSH are okay        Lovenox 40 mg SC daily for DVT prophylaxis.  Full code.  Discussed with patient, nursing staff and care team on multidisciplinary rounds.  Anticipate discharge to SNU facility in 1-2 days.         Benjamin Kiran MD  09/09/21  15:34 EDT

## 2021-09-09 NOTE — THERAPY TREATMENT NOTE
Patient Name: Jake Kan  : 1945    MRN: [id number]                              Today's Date: 2021       Admit Date: 2021    Visit Dx:     ICD-10-CM ICD-9-CM   1. Pneumonia due to COVID-19 virus  U07.1 480.8    J12.82 079.89   2. Acute kidney injury (ANA) with acute tubular necrosis (ATN) (CMS/HCC)  N17.0 584.5   3. Acute UTI  N39.0 599.0   4. Altered mental status, unspecified altered mental status type  R41.82 780.97     Patient Active Problem List   Diagnosis   • Sciatica of right side   • Phlegm in throat   • Heartburn   • Abnormal EKG   • Debility   • Hyperlipidemia   • Other symptoms involving digestive system   • Paresthesias   • Pruritus   • Rosacea   • Seborrheic dermatitis   • Pneumonia due to COVID-19 virus   • Metabolic encephalopathy   • Dehydration   • Hypernatremia   • ANA (acute kidney injury) (CMS/HCC)   • Stage 3a chronic kidney disease (CMS/HCC)   • Positive D-dimer   • Acute cystitis   • Hypoxia     History reviewed. No pertinent past medical history.  Past Surgical History:   Procedure Laterality Date   • HERNIA REPAIR     • UMBILICAL HERNIA REPAIR N/A 2019    Procedure: excision of umbilical hernia mesh and umbilical hernia repair;  Surgeon: Luan Dowell MD;  Location: [facility];  Service: General   • WRIST FRACTURE SURGERY Left      General Information     Row Name 21 1424          OT Time and Intention    Document Type  therapy note (daily note)  -SM     Mode of Treatment  occupational therapy;individual therapy  -     Row Name 21 1424          General Information    Patient Profile Reviewed  yes  -SM     Existing Precautions/Restrictions  fall COVID 19 positive  -SM     Row Name 21 1424          Cognition    Orientation Status (Cognition)  oriented to;person  -SM     Row Name 21 1424          Safety Issues, Functional Mobility    Safety Issues Affecting Function (Mobility)  insight into deficits/self-awareness;at risk  behavior observed;impulsivity;awareness of need for assistance;ability to follow commands;safety precaution awareness;safety precautions follow-through/compliance  -     Impairments Affecting Function (Mobility)  balance;cognition;coordination;endurance/activity tolerance;strength  -       User Key  (r) = Recorded By, (t) = Taken By, (c) = Cosigned By    Initials Name Provider Type     Morena Teague, OT Occupational Therapist          Mobility/ADL's     Row Name 09/09/21 1425          Bed Mobility    Supine-Sit [place] (Bed Mobility)  moderate assist (50% patient effort);verbal cues;nonverbal cues (demo/gesture)  -     Sit-Supine [place] (Bed Mobility)  moderate assist (50% patient effort);verbal cues;nonverbal cues (demo/gesture)  -     Assistive Device (Bed Mobility)  bed rails;head of bed elevated  -     Row Name 09/09/21 1425          Transfers    Comment (Transfers)  unable to clear bottom from bed with Max AX1 with HHA and vc's, weakness and confusion limiting.  -     Assistive Device (Bed-Chair Transfers)  -- no wx present in room, would benefit from OT bringing one next session.  -     Row Name 09/09/21 1425          Activities of Daily Living    BADL Assessment/Intervention  grooming;upper body dressing;toileting  -     Row Name 09/09/21 1425          Bathing Assessment/Intervention    [place] Level (Bathing)  bathing skills;moderate assist (50% patient effort);verbal cues;nonverbal cues (demo/gesture)  -     Position (Bathing)  sitting up in bed  -     Comment (Bathing)  baths UB, max A LB.  -     Row Name 09/09/21 1425          Lower Body Dressing Assessment/Training    [place] Level (Lower Body Dressing)  lower body dressing skills;don;socks;dependent (less than 25% patient effort)  -     Position (Lower Body Dressing)  edge of bed sitting  -     Comment (Lower Body Dressing)  very impulsive with leaning forward sitting EOB and continues to scoot very  close to edge despite OT cues for safety.  -[facility] Name 09/09/21 1425          Grooming Assessment/Training    [place] Level (Grooming)  grooming skills;oral care regimen;verbal cues;nonverbal cues (demo/gesture);minimum assist (75% patient effort)  -     Position (Grooming)  edge of bed sitting  -     Comment (Grooming)  difficulty holding onto oral swab, drops of first attempt. Extra time needed. Needs hand over hand to intiate  -[facility] Name 09/09/21 1425          Upper Body Dressing Assessment/Training    [place] Level (Upper Body Dressing)  upper body dressing skills;doff;don;pajama/robe;moderate assist (50% patient effort);verbal cues;nonverbal cues (demo/gesture) hospital gown  -     Position (Upper Body Dressing)  sitting up in bed  -[facility] Name 09/09/21 1425          Toileting Assessment/Training    [place] Level (Toileting)  toileting skills;dependent (less than 25% patient effort)  -     Position (Toileting)  supine  -       User Key  (r) = Recorded By, (t) = Taken By, (c) = Cosigned By    Initials Name Provider Type     Morena Teague OT Occupational Therapist        Obj/Interventions     [facility] Name 09/09/21 1430          Shoulder (Therapeutic Exercise)    Shoulder (Therapeutic Exercise)  AAROM (active assistive range of motion)  -     Shoulder AAROM (Therapeutic Exercise)  bilateral;flexion;extension;10 repetitions forward press, needs verbal and tactile cues to initate movement and following gross motor movements.  -[facility] Name 09/09/21 1430          Balance    Static Sitting Balance  sitting, edge of bed;mild impairment CGA/Min  -     Dynamic Sitting Balance  mild impairment CGA/Min A  -     Comment, Balance  Sat EOB during partial ADL and UE exercises, close hands on assist for impulsivity as well as CGA/Min A for sitting balance throughout. Sat EOB ~ 20 min.  -[facility] Name 09/09/21 1430          Therapeutic Exercise    Therapeutic Exercise   shoulder  -       User Key  (r) = Recorded By, (t) = Taken By, (c) = Cosigned By    Initials Name Provider Type     Morena Teague OT Occupational Therapist        Goals/Plan    No documentation.       Clinical Impression     Row Name 09/09/21 1432          Pain Scale: Numbers Pre/Post-Treatment    Pretreatment Pain Rating  0/10 - no pain  -[facility] Name 09/09/21 [facility]          Plan of Care Review    Plan of Care Reviewed With  patient  -     Outcome Summary  Pt seen today for OT, he is oriented to person only and requires visual, verbal, tactile cues for following one step commands. Pt with general confusion. He requires mod A for bed mobility and completed UE ther ex and working on ADL EOB today. He was unable to clear bottom with max AX1 from bed. He is very impulsive with EOB sitting and returned bed level for bathing. He bathes UB with SBA and cues and Max A for LBD and total A for toilet hygine/brief change after incontience of bladder. Pt recommended SNF at [place].  -[facility] Name 09/09/21 1432          Therapy Assessment/Plan (OT)    Rehab Potential (OT)  good, to achieve stated therapy goals  -     Criteria for Skilled Therapeutic Interventions Met (OT)  yes;skilled treatment is necessary  -[facility] Name 09/09/21 [facility]          Therapy Plan Review/Discharge Plan (OT)    Anticipated Discharge Disposition (OT)  skilled nursing facility  -[facility] Name 09/09/21 1432          Vital Signs    Pre SpO2 (%)  96  -SM     O2 Delivery Pre Treatment  room air  -     Post SpO2 (%)  95  -SM     O2 Delivery Post Treatment  room air  -[facility] Name 09/09/21 1432          Positioning and Restraints    Pre-Treatment Position  in bed  -SM     Post Treatment Position  bed  -SM     In Bed  supine;with nsg  -SM     Restraints  released:;soft limb released wrist restraints during OT, OK's by RN. Left with nsg aid to reapply restraints after finishing bath.  -       User Key  (r) = Recorded By, (t) = Taken By,  (c) = Cosigned By    Initials Name Provider Type    Morena Prasad OT Occupational Therapist        Outcome Measures     Row Name 09/09/21 1437          How much help from another is currently needed...    Putting on and taking off regular lower body clothing?  1  -SM     Bathing (including washing, rinsing, and drying)  2  -SM     Toileting (which includes using toilet bed pan or urinal)  1  -SM     Putting on and taking off regular upper body clothing  2  -SM     Taking care of personal grooming (such as brushing teeth)  2  -SM     Eating meals  3  -SM     AM-PAC 6 Clicks Score (OT)  11  -SM     Row Name 09/09/21 1437          Functional Assessment    Outcome Measure Options  AM-PAC 6 Clicks Daily Activity (OT)  -SM       User Key  (r) = Recorded By, (t) = Taken By, (c) = Cosigned By    Initials Name Provider Type    Morena Prasad OT Occupational Therapist          Occupational Therapy Education                 Title: PT OT SLP Therapies (In Progress)     Topic: Occupational Therapy (In Progress)     Point: ADL training (Done)     Description:   Instruct learner(s) on proper safety adaptation and remediation techniques during self care or transfers.   Instruct in proper use of assistive devices.              Learning Progress Summary           Patient Acceptance, E, VU by BL at 9/3/2021 1302    Comment: The role of OT                   Point: Home exercise program (Not Started)     Description:   Instruct learner(s) on appropriate technique for monitoring, assisting and/or progressing therapeutic exercises/activities.              Learner Progress:  Not documented in this visit.          Point: Precautions (Not Started)     Description:   Instruct learner(s) on prescribed precautions during self-care and functional transfers.              Learner Progress:  Not documented in this visit.          Point: Body mechanics (Not Started)     Description:   Instruct learner(s) on proper positioning and  spine alignment during self-care, functional mobility activities and/or exercises.              Learner Progress:  Not documented in this visit.                      User Key     Initials Effective Dates Name Provider Type Discipline     01/05/21 -  Cara Baker OT Occupational Therapist OT              OT Recommendation and Plan     Plan of Care Review  Plan of Care Reviewed With: patient  Outcome Summary: Pt seen today for OT, he is oriented to person only and requires visual, verbal, tactile cues for following one step commands. Pt with general confusion. He requires mod A for bed mobility and completed UE ther ex and working on ADL EOB today. He was unable to clear bottom with max AX1 from bed. He is very impulsive with EOB sitting and returned bed level for bathing. He bathes UB with SBA and cues and Max A for LBD and total A for toilet hygine/brief change after incontience of bladder. Pt recommended SNF at [place].     Time Calculation:   Time Calculation- OT     Row Name 09/09/21 1439             Time Calculation- OT    OT Start Time  1336  -SM      OT Stop Time  1411  -SM      OT Time Calculation (min)  35 min  -SM      Total Timed Code Minutes- OT  35 minute(s)  -SM      OT Received On  09/09/21  -SM      OT - Next Appointment  09/10/21  -SM         Timed Charges    38648 - OT Therapeutic Exercise Minutes  10  -SM      96219 - OT Self Care/Mgmt Minutes  25  -SM         Total Minutes    Timed Charges Total Minutes  35  -SM       Total Minutes  35  -SM        User Key  (r) = Recorded By, (t) = Taken By, (c) = Cosigned By    Initials Name Provider Type     Morena Teague OT Occupational Therapist        Therapy Charges for Today     Code Description Service Date Service Provider Modifiers Qty    75775859709 HC OT SELF CARE/MGMT/TRAIN EA 15 MIN 9/9/2021 Morena Teague OT GO 1    14787792979 HC OT THER PROC EA 15 MIN 9/9/2021 Morena Teague OT GO 1               Morena Teague OT  9/9/2021

## 2021-09-09 NOTE — PLAN OF CARE
Goal Outcome Evaluation:      Patient confused, alert to self, continues on restraints. Will continue to observe.

## 2021-09-09 NOTE — PLAN OF CARE
Problem: Adult Inpatient Plan of Care  Goal: Absence of Hospital-Acquired Illness or Injury  Outcome: Ongoing, Progressing  Intervention: Identify and Manage Fall Risk  Recent Flowsheet Documentation  Taken 9/9/2021 0400 by Sidra Paulino RN  Safety Promotion/Fall Prevention:   room organization consistent   safety round/check completed   nonskid shoes/slippers when out of bed  Taken 9/9/2021 0200 by Sidra Paulino RN  Safety Promotion/Fall Prevention:   safety round/check completed   room organization consistent   nonskid shoes/slippers when out of bed  Taken 9/8/2021 2200 by Sidra Paulino RN  Safety Promotion/Fall Prevention:   nonskid shoes/slippers when out of bed   room organization consistent   safety round/check completed  Taken 9/8/2021 2000 by Sidra Paulino RN  Safety Promotion/Fall Prevention:   activity supervised   nonskid shoes/slippers when out of bed   room organization consistent   safety round/check completed  Intervention: Prevent Skin Injury  Recent Flowsheet Documentation  Taken 9/9/2021 0400 by Sidra Paulino RN  Body Position:   weight shift assistance provided   legs elevated  Taken 9/9/2021 0200 by Sidra Paulino RN  Body Position: side-lying, right  Taken 9/9/2021 0000 by Sidra Paulino RN  Body Position: supine  Taken 9/8/2021 2200 by Sidra Paulino RN  Body Position:   supine   position maintained  Taken 9/8/2021 2000 by Sidra Paulino RN  Body Position:   position changed independently   supine  Skin Protection: adhesive use limited  Intervention: Prevent and Manage VTE (venous thromboembolism) Risk  Recent Flowsheet Documentation  Taken 9/8/2021 2000 by Sidra Paulino RN  VTE Prevention/Management:   bilateral   dorsiflexion/plantar flexion performed  Intervention: Prevent Infection  Recent Flowsheet Documentation  Taken 9/9/2021 0400 by Sidra Paulino RN  Infection Prevention: rest/sleep promoted  Taken 9/9/2021 0200  by Sidra Paulino RN  Infection Prevention: rest/sleep promoted  Taken 9/9/2021 0000 by Sidra Paulino RN  Infection Prevention: rest/sleep promoted  Taken 9/8/2021 2200 by Sidra Paulino RN  Infection Prevention: rest/sleep promoted  Taken 9/8/2021 2000 by Sidra Paulino RN  Infection Prevention: rest/sleep promoted     Problem: Adult Inpatient Plan of Care  Goal: Absence of Hospital-Acquired Illness or Injury  Intervention: Prevent Skin Injury  Recent Flowsheet Documentation  Taken 9/9/2021 0400 by Sidra Paulino RN  Body Position:   weight shift assistance provided   legs elevated  Taken 9/9/2021 0200 by Sidra Paulino RN  Body Position: side-lying, right  Taken 9/9/2021 0000 by Sidra Paulino RN  Body Position: supine  Taken 9/8/2021 2200 by Sidra Paulino RN  Body Position:   supine   position maintained  Taken 9/8/2021 2000 by Sidra Paulino RN  Body Position:   position changed independently   supine  Skin Protection: adhesive use limited     Problem: Fall Injury Risk  Goal: Absence of Fall and Fall-Related Injury  Outcome: Ongoing, Progressing  Intervention: Promote Injury-Free Environment  Recent Flowsheet Documentation  Taken 9/9/2021 0400 by Sidra Paulino RN  Safety Promotion/Fall Prevention:   room organization consistent   safety round/check completed   nonskid shoes/slippers when out of bed  Taken 9/9/2021 0200 by Sidra Paulino RN  Safety Promotion/Fall Prevention:   safety round/check completed   room organization consistent   nonskid shoes/slippers when out of bed  Taken 9/8/2021 2200 by Sidra Paulino RN  Safety Promotion/Fall Prevention:   nonskid shoes/slippers when out of bed   room organization consistent   safety round/check completed  Taken 9/8/2021 2000 by Sidra Paulino RN  Safety Promotion/Fall Prevention:   activity supervised   nonskid shoes/slippers when out of bed   room organization consistent   safety  round/check completed     Problem: Restraint, Nonbehavioral (Nonviolent)  Goal: Discontinuation Criteria Achieved  Outcome: Ongoing, Progressing  Intervention: Implement Least-restrictive Safety Strategies  Recent Flowsheet Documentation  Taken 9/8/2021 2000 by Sidra Paulino RN  Diversional Activities: television  Goal: Personal Dignity and Safety Maintained  Outcome: Ongoing, Progressing  Intervention: Protect Dignity, Rights, and Personal Wellbeing  Recent Flowsheet Documentation  Taken 9/8/2021 2000 by Sidra Paulino RN  Trust Relationship/Rapport: care explained  Intervention: Protect Skin and Joint Integrity  Recent Flowsheet Documentation  Taken 9/9/2021 0400 by Sidra Paulino RN  Body Position:   weight shift assistance provided   legs elevated  Taken 9/9/2021 0200 by Sdira Paulino RN  Body Position: side-lying, right  Taken 9/9/2021 0000 by Sidra Paulino RN  Body Position: supine  Taken 9/8/2021 2200 by Sidra Paulino RN  Body Position:   supine   position maintained  Taken 9/8/2021 2000 by Sidra Paulino RN  Body Position:   position changed independently   supine   Goal Outcome Evaluation:  Plan of Care Reviewed With: patient        Progress: no change  Outcome Summary: Pt alert to self; Restraints remain intact; Zyprexa given PO; Brother called for update; WIll continue to monitor

## 2021-09-09 NOTE — NURSING NOTE
Patient picked up by transport for CT scan as ordered. At approximately 0930.   Transported by stretcher.  Continues restraints.      Approx. 0948 patient returned to room,  Sleeping at this time.  Transferred back to bed,  Restraints continued, telemetry monitor placed.

## 2021-09-09 NOTE — PLAN OF CARE
Goal Outcome Evaluation:  Plan of Care Reviewed With: patient           Outcome Summary: Pt seen today for OT, he is oriented to person only and requires visual, verbal, tactile cues for following one step commands. Pt with general confusion. He requires mod A for bed mobility and completed UE ther ex and working on ADL EOB today. He was unable to clear bottom with max AX1 from bed. He is very impulsive with EOB sitting and returned bed level for bathing. He bathes UB with SBA and cues and Max A for LBD and total A for toilet hygine/brief change after incontience of bladder. Pt recommended SNF at [place].    Appropriate PPE worn during encounter following enhanced droplet precautions. OT wore a CAPR. Pt did not wear a mask.

## 2021-09-10 LAB
ALBUMIN SERPL-MCNC: 3 G/DL (ref 3.5–5.2)
ALBUMIN/GLOB SERPL: 0.8 G/DL
ALP SERPL-CCNC: 68 U/L (ref 39–117)
ALT SERPL W P-5'-P-CCNC: 75 U/L (ref 1–41)
ANION GAP SERPL CALCULATED.3IONS-SCNC: 11.8 MMOL/L (ref 5–15)
AST SERPL-CCNC: 38 U/L (ref 1–40)
BASOPHILS # BLD AUTO: 0.02 10*3/MM3 (ref 0–0.2)
BASOPHILS NFR BLD AUTO: 0.2 % (ref 0–1.5)
BILIRUB SERPL-MCNC: 1.1 MG/DL (ref 0–1.2)
BUN SERPL-MCNC: 30 MG/DL (ref 8–23)
BUN/CREAT SERPL: 30.3 (ref 7–25)
CALCIUM SPEC-SCNC: 8.9 MG/DL (ref 8.6–10.5)
CHLORIDE SERPL-SCNC: 108 MMOL/L (ref 98–107)
CO2 SERPL-SCNC: 23.2 MMOL/L (ref 22–29)
CREAT SERPL-MCNC: 0.99 MG/DL (ref 0.76–1.27)
DEPRECATED RDW RBC AUTO: 43.9 FL (ref 37–54)
EOSINOPHIL # BLD AUTO: 0.09 10*3/MM3 (ref 0–0.4)
EOSINOPHIL NFR BLD AUTO: 0.8 % (ref 0.3–6.2)
ERYTHROCYTE [DISTWIDTH] IN BLOOD BY AUTOMATED COUNT: 12.8 % (ref 12.3–15.4)
GFR SERPL CREATININE-BSD FRML MDRD: 73 ML/MIN/1.73
GLOBULIN UR ELPH-MCNC: 3.6 GM/DL
GLUCOSE SERPL-MCNC: 82 MG/DL (ref 65–99)
HCT VFR BLD AUTO: 43.1 % (ref 37.5–51)
HGB BLD-MCNC: 14.3 G/DL (ref 13–17.7)
IMM GRANULOCYTES # BLD AUTO: 0.07 10*3/MM3 (ref 0–0.05)
IMM GRANULOCYTES NFR BLD AUTO: 0.6 % (ref 0–0.5)
LYMPHOCYTES # BLD AUTO: 1.64 10*3/MM3 (ref 0.7–3.1)
LYMPHOCYTES NFR BLD AUTO: 14.2 % (ref 19.6–45.3)
MCH RBC QN AUTO: 31.1 PG (ref 26.6–33)
MCHC RBC AUTO-ENTMCNC: 33.2 G/DL (ref 31.5–35.7)
MCV RBC AUTO: 93.7 FL (ref 79–97)
MONOCYTES # BLD AUTO: 0.89 10*3/MM3 (ref 0.1–0.9)
MONOCYTES NFR BLD AUTO: 7.7 % (ref 5–12)
NEUTROPHILS NFR BLD AUTO: 76.5 % (ref 42.7–76)
NEUTROPHILS NFR BLD AUTO: 8.81 10*3/MM3 (ref 1.7–7)
NRBC BLD AUTO-RTO: 0 /100 WBC (ref 0–0.2)
PLATELET # BLD AUTO: 391 10*3/MM3 (ref 140–450)
PMV BLD AUTO: 10.8 FL (ref 6–12)
POTASSIUM SERPL-SCNC: 3.7 MMOL/L (ref 3.5–5.2)
PROT SERPL-MCNC: 6.6 G/DL (ref 6–8.5)
RBC # BLD AUTO: 4.6 10*6/MM3 (ref 4.14–5.8)
SARS-COV-2 RNA RESP QL NAA+PROBE: NOT DETECTED
SODIUM SERPL-SCNC: 143 MMOL/L (ref 136–145)
WBC # BLD AUTO: 11.52 10*3/MM3 (ref 3.4–10.8)

## 2021-09-10 PROCEDURE — U0005 INFEC AGEN DETEC AMPLI PROBE: HCPCS | Performed by: HOSPITALIST

## 2021-09-10 PROCEDURE — U0003 INFECTIOUS AGENT DETECTION BY NUCLEIC ACID (DNA OR RNA); SEVERE ACUTE RESPIRATORY SYNDROME CORONAVIRUS 2 (SARS-COV-2) (CORONAVIRUS DISEASE [COVID-19]), AMPLIFIED PROBE TECHNIQUE, MAKING USE OF HIGH THROUGHPUT TECHNOLOGIES AS DESCRIBED BY CMS-2020-01-R: HCPCS | Performed by: HOSPITALIST

## 2021-09-10 PROCEDURE — 85025 COMPLETE CBC W/AUTO DIFF WBC: CPT | Performed by: HOSPITALIST

## 2021-09-10 PROCEDURE — 99222 1ST HOSP IP/OBS MODERATE 55: CPT | Performed by: PSYCHIATRY & NEUROLOGY

## 2021-09-10 PROCEDURE — 97530 THERAPEUTIC ACTIVITIES: CPT

## 2021-09-10 PROCEDURE — 25010000002 ENOXAPARIN PER 10 MG: Performed by: INTERNAL MEDICINE

## 2021-09-10 PROCEDURE — 80053 COMPREHEN METABOLIC PANEL: CPT | Performed by: HOSPITALIST

## 2021-09-10 PROCEDURE — 63710000001 DEXAMETHASONE PER 0.25 MG: Performed by: INTERNAL MEDICINE

## 2021-09-10 RX ADMIN — PANTOPRAZOLE SODIUM 40 MG: 40 TABLET, DELAYED RELEASE ORAL at 06:37

## 2021-09-10 RX ADMIN — OLANZAPINE 7.5 MG: 7.5 TABLET ORAL at 20:24

## 2021-09-10 RX ADMIN — SODIUM CHLORIDE, PRESERVATIVE FREE 3 ML: 5 INJECTION INTRAVENOUS at 10:19

## 2021-09-10 RX ADMIN — ENOXAPARIN SODIUM 40 MG: 40 INJECTION SUBCUTANEOUS at 10:18

## 2021-09-10 RX ADMIN — Medication 5 MG: at 01:03

## 2021-09-10 RX ADMIN — DEXAMETHASONE 6 MG: 4 TABLET ORAL at 10:18

## 2021-09-10 RX ADMIN — SODIUM CHLORIDE, PRESERVATIVE FREE 10 ML: 5 INJECTION INTRAVENOUS at 20:25

## 2021-09-10 NOTE — PLAN OF CARE
Goal Outcome Evaluation:  Plan of Care Reviewed With: patient     Outcome Summary: Pt was found supine in bed c shawn wrist restraints on, pt on room air. Pt initially not oriented to self or place and presents c unintelligable speech and diffiuclty following simple commands. Pt begins to speak more clearly with initiation of mobility. Neurology MD enters for assessmnet. Pt sits EOB c Max A and Max verbal and visual cues, initially c strong posterior lean but is able to progress to CGA at times. Sit<>stand c Max A, severe retropulsive posture upon standing as he demo's poor proprioception and awareness of midline. Attempts to take lateral steps towards HOB c cues and MaxA but is limited by impaired coordination,strength, and cognition. Pt returns to supine, restraints placed, HOB fully elevated to simulate being in chair position. At departure, pt is able to state that he is at [facility].    Patient was was not wearing a face mask during this therapy encounter. Therapist used appropriate personal protective equipment including mask, gloves, eye protection, face shield, and gown.  Mask used was N95/duckbill. Appropriate PPE was worn during the entire therapy session. Hand hygiene was completed before and after therapy session. Patient is in enhanced droplet precautions.

## 2021-09-10 NOTE — PLAN OF CARE
Goal Outcome Evaluation:  Plan of Care Reviewed With: patient        Progress: no change  Outcome Summary: VSS; NSR on telemetry; currently on room air and tolerating. pt has general confusion and is only oriented to self; he will follow basic commands but continues to try to get out of bed. He had a somewhat restless night. Will continue to monitor

## 2021-09-10 NOTE — THERAPY TREATMENT NOTE
Patient Name: Jake Kan  : 1945    MRN: [id number]                              Today's Date: 9/10/2021       Admit Date: 2021    Visit Dx:     ICD-10-CM ICD-9-CM   1. Pneumonia due to COVID-19 virus  U07.1 480.8    J12.82 079.89   2. Acute kidney injury (ANA) with acute tubular necrosis (ATN) (CMS/HCC)  N17.0 584.5   3. Acute UTI  N39.0 599.0   4. Altered mental status, unspecified altered mental status type  R41.82 780.97     Patient Active Problem List   Diagnosis   • Sciatica of right side   • Phlegm in throat   • Heartburn   • Abnormal EKG   • Debility   • Hyperlipidemia   • Other symptoms involving digestive system   • Paresthesias   • Pruritus   • Rosacea   • Seborrheic dermatitis   • Pneumonia due to COVID-19 virus   • Metabolic encephalopathy   • Dehydration   • Hypernatremia   • ANA (acute kidney injury) (CMS/HCC)   • Stage 3a chronic kidney disease (CMS/HCC)   • Positive D-dimer   • Acute cystitis   • Hypoxia     History reviewed. No pertinent past medical history.  Past Surgical History:   Procedure Laterality Date   • HERNIA REPAIR     • UMBILICAL HERNIA REPAIR N/A 2019    Procedure: excision of umbilical hernia mesh and umbilical hernia repair;  Surgeon: Luan Dowell MD;  Location: [facility] OR;  Service: General   • WRIST FRACTURE SURGERY Left      General Information    No documentation.       Mobility    No documentation.       Obj/Interventions    No documentation.       Goals/Plan    No documentation.       Clinical Impression    No documentation.       Outcome Measures     Row Name 09/10/21 1535          Functional Assessment    Outcome Measure Options  AM-PAC 6 Clicks Daily Activity (OT)  -JOSE ROBERTO       User Key  (r) = Recorded By, (t) = Taken By, (c) = Cosigned By    Initials Name Provider Type    Hermelinda Dillard OT Occupational Therapist                       Physical Therapy Education                 Title: PT OT SLP Therapies (In Progress)     Topic: Physical  Therapy (In Progress)     Point: Mobility training (In Progress)     Learning Progress Summary           Patient Acceptance, E,D, NR by YAKELIN at 9/9/2021 1655    Acceptance, E,D, NR by YAYO at 9/6/2021 1022    Acceptance, E, NR by SOFI at 9/3/2021 1242                   Point: Home exercise program (In Progress)     Learning Progress Summary           Patient Acceptance, E,D, NR by  at 9/9/2021 1655    Acceptance, E,D, NR by YAYO at 9/6/2021 1022                   Point: Body mechanics (In Progress)     Learning Progress Summary           Patient Acceptance, E,D, NR by YAKELIN at 9/9/2021 1655    Acceptance, E,D, NR by YAYO at 9/6/2021 1022    Acceptance, E, NR by SOFI at 9/3/2021 1242                   Point: Precautions (In Progress)     Learning Progress Summary           Patient Acceptance, E,D, NR by YAKELIN at 9/9/2021 1655    Acceptance, E,D, NR by YAYO at 9/6/2021 1022    Acceptance, E, NR by SOFI at 9/3/2021 1242                               User Key     Initials Effective Dates Name Provider Type Discipline     03/07/18 -  Sidra Chris PTA Physical Therapy Assistant PT     07/02/20 -  Hadley Coe, PT DPT Physical Therapist PT     10/25/19 -  Dinora Torres, PT Physical Therapist PT              PT Recommendation and Plan     Plan of Care Reviewed With: patient  Outcome Summary: upon entry, pt moving legs about, trying to put over EOB LLE, pt did settle down , and perf LE strengthening exer, speech garbled at times, plans SNU at [place], unsafe to attempt tfers, standing this visit     Time Calculation:     Therapy Charges for Today     Code Description Service Date Service Provider Modifiers Qty    33214367888 HC PT THER PROC EA 15 MIN 9/9/2021 Sidra Chris PTA GP 1          PT G-Codes  Outcome Measure Options: AM-PAC 6 Clicks Daily Activity (OT)  AM-PAC 6 Clicks Score (PT): 9  AM-PAC 6 Clicks Score (OT): 10  Modified Dumas Scale: 4 - Moderately severe disability.  Unable to walk without assistance, and unable to  attend to own bodily needs without assistance.    Sidra Chris, PTA  9/10/2021

## 2021-09-10 NOTE — THERAPY TREATMENT NOTE
Patient Name: Jake Kan  : 1945    MRN: [id number]                              Today's Date: 9/10/2021       Admit Date: 2021    Visit Dx:     ICD-10-CM ICD-9-CM   1. Pneumonia due to COVID-19 virus  U07.1 480.8    J12.82 079.89   2. Acute kidney injury (ANA) with acute tubular necrosis (ATN) (CMS/HCC)  N17.0 584.5   3. Acute UTI  N39.0 599.0   4. Altered mental status, unspecified altered mental status type  R41.82 780.97     Patient Active Problem List   Diagnosis   • Sciatica of right side   • Phlegm in throat   • Heartburn   • Abnormal EKG   • Debility   • Hyperlipidemia   • Other symptoms involving digestive system   • Paresthesias   • Pruritus   • Rosacea   • Seborrheic dermatitis   • Pneumonia due to COVID-19 virus   • Metabolic encephalopathy   • Dehydration   • Hypernatremia   • ANA (acute kidney injury) (CMS/HCC)   • Stage 3a chronic kidney disease (CMS/HCC)   • Positive D-dimer   • Acute cystitis   • Hypoxia     History reviewed. No pertinent past medical history.  Past Surgical History:   Procedure Laterality Date   • HERNIA REPAIR     • UMBILICAL HERNIA REPAIR N/A 2019    Procedure: excision of umbilical hernia mesh and umbilical hernia repair;  Surgeon: Luan Dowell MD;  Location: [facility];  Service: General   • WRIST FRACTURE SURGERY Left      General Information     Row Name 09/10/21 1519          OT Time and Intention    Document Type  therapy note (daily note)  -     Mode of Treatment  individual therapy;occupational therapy  -     Row Name 09/10/21 1519          General Information    Patient Profile Reviewed  yes  -     Existing Precautions/Restrictions  fall  -     Barriers to Rehab  cognitive status;medically complex  -     Row Name 09/10/21 1519          Cognition    Orientation Status (Cognition)  oriented to;verbal cues/prompts needed for orientation;place  -     Row Name 09/10/21 1519          Safety Issues, Functional Mobility    Safety  Issues Affecting Function (Mobility)  ability to follow commands;at risk behavior observed;awareness of need for assistance;impulsivity;insight into deficits/self-awareness;judgment;problem-solving;safety precaution awareness;sequencing abilities  -     Impairments Affecting Function (Mobility)  balance;cognition;coordination;endurance/activity tolerance;strength;motor planning;postural/trunk control;visual/perceptual  -     Cognitive Impairments, Mobility Safety/Performance  attention;awareness, need for assistance;impulsivity;insight into deficits/self-awareness;safety precaution awareness;safety precaution follow-through;sequencing abilities  -       User Key  (r) = Recorded By, (t) = Taken By, (c) = Cosigned By    Initials Name Provider Type     Hermelinda Moss OT Occupational Therapist          Mobility/ADL's     Row Name 09/10/21 1521          Bed Mobility    Bed Mobility  supine-sit;sit-supine;rolling left;rolling right  -     Rolling Left [place] (Bed Mobility)  maximum assist (25% patient effort);verbal cues;nonverbal cues (demo/gesture)  -     Rolling Right [place] (Bed Mobility)  maximum assist (25% patient effort);verbal cues;nonverbal cues (demo/gesture)  -     Supine-Sit [place] (Bed Mobility)  maximum assist (25% patient effort);2 person assist  -     Sit-Supine [place] (Bed Mobility)  maximum assist (25% patient effort);2 person assist;verbal cues;nonverbal cues (demo/gesture)  -     Bed Mobility, Safety Issues  cognitive deficits limit understanding;impaired trunk control for bed mobility  -     Assistive Device (Bed Mobility)  bed rails;head of bed elevated;draw sheet  -     Row Name 09/10/21 1521          Transfers    Comment (Transfers)  STS c Max A and max verbal and visual cues to correct retropulsive posture c full body extension  -     Sit-Stand [place] (Transfers)  maximum assist (25% patient effort);verbal cues;nonverbal cues (demo/gesture)  -      Row Name 09/10/21 1521          Functional Mobility    Functional Mobility- Ind. Level  maximum assist (25% patient effort);verbal cues required;nonverbal cues required (demo/gesture)  -       User Key  (r) = Recorded By, (t) = Taken By, (c) = Cosigned By    Initials Name Provider Type    Hermelinda Dillard OT Occupational Therapist        Obj/Interventions     Row Name 09/10/21 1528          Balance    Static Sitting Balance  moderate impairment;supported;sitting, edge of bed  -     Dynamic Sitting Balance  severe impairment;supported;sitting, edge of bed  -     Sit to Stand Dynamic Balance  severe impairment;supported;standing;asymmetrical weight shifting  -     Static Standing Balance  severe impairment;supported;standing  -     Dynamic Standing Balance  severe impairment;supported;standing  -     Balance Interventions  sitting;standing;sit to stand;supported;static;highly challenging  -     Comment, Balance  Severe retropulsive posture c full body extension. Appears to have impaired proprioception during fxl mobility. Max A to maintain sitting balance at EOB to correct posterior leaning  -       User Key  (r) = Recorded By, (t) = Taken By, (c) = Cosigned By    Initials Name Provider Type    Hermelinda Dillard OT Occupational Therapist        Goals/Plan    No documentation.       Clinical Impression     Row Name 09/10/21 1531          Pain Assessment    Additional Documentation  Pain Scale: Numbers Pre/Post-Treatment (Group)  -JW     Row Name 09/10/21 1531          Pain Scale: Numbers Pre/Post-Treatment    Pretreatment Pain Rating  0/10 - no pain  -     Posttreatment Pain Rating  0/10 - no pain  -JW     Row Name 09/10/21 1531          Plan of Care Review    Plan of Care Reviewed With  patient  -     Outcome Summary  Pt was found supine in bed c shawn wrist restraints on, pt on room air. Pt initially not oriented to self or place and presents c unintelligable speech and diffiuclty following simple  commands. Pt begins to speak more clearly with initiation of mobility. Neurology MD enters for assessmnet. Pt sits EOB c Max A and Max verbal and visual cues, initially c strong posterior lean but is able to progress to CGA at times. Sit<>stand c Max A, severe retropulsive posture upon standing as he demo's poor proprioception and awareness of midline. Attempts to take lateral steps towards HOB c cues and MaxA but is limited by impaired coordination,strength, and cognition. Pt returns to supine, restraints placed, HOB fully elevated to simulate being in chair position. At departure, pt is able to state that he is at [facility].  -     Row Name 09/10/21 1531          Therapy Assessment/Plan (OT)    Therapy Frequency (OT)  3 times/wk  -     Row Name 09/10/21 1531          Positioning and Restraints    Pre-Treatment Position  in bed  -JW     Post Treatment Position  bed  -JW     In Bed  notified nsg;fowlers;call light within reach;encouraged to call for assist;exit alarm on  -JW     Restraints  released:;reapplied:;soft limb  -       User Key  (r) = Recorded By, (t) = Taken By, (c) = Cosigned By    Initials Name Provider Type    Hermelinda Dillard OT Occupational Therapist        Outcome Measures     Row Name 09/10/21 1535          How much help from another is currently needed...    Putting on and taking off regular lower body clothing?  1  -JW     Bathing (including washing, rinsing, and drying)  1  -JW     Toileting (which includes using toilet bed pan or urinal)  1  -JW     Putting on and taking off regular upper body clothing  2  -JW     Taking care of personal grooming (such as brushing teeth)  2  -JW     Eating meals  3  -JW     AM-PAC 6 Clicks Score (OT)  10  -     Row Name 09/10/21 1535          Functional Assessment    Outcome Measure Options  AM-PAC 6 Clicks Daily Activity (OT)  -       User Key  (r) = Recorded By, (t) = Taken By, (c) = Cosigned By    Initials Name Provider Type    JOSE ROBERTO Moss  LETICIA Shen Occupational Therapist          Occupational Therapy Education                 Title: PT OT SLP Therapies (In Progress)     Topic: Occupational Therapy (In Progress)     Point: ADL training (Done)     Description:   Instruct learner(s) on proper safety adaptation and remediation techniques during self care or transfers.   Instruct in proper use of assistive devices.              Learning Progress Summary           Patient Acceptance, FLORES, VU by  at 9/3/2021 1302    Comment: The role of OT                   Point: Home exercise program (Not Started)     Description:   Instruct learner(s) on appropriate technique for monitoring, assisting and/or progressing therapeutic exercises/activities.              Learner Progress:  Not documented in this visit.          Point: Precautions (Not Started)     Description:   Instruct learner(s) on prescribed precautions during self-care and functional transfers.              Learner Progress:  Not documented in this visit.          Point: Body mechanics (Not Started)     Description:   Instruct learner(s) on proper positioning and spine alignment during self-care, functional mobility activities and/or exercises.              Learner Progress:  Not documented in this visit.                      User Key     Initials Effective Dates Name Provider Type Discipline     01/05/21 -  Cara Baker OT Occupational Therapist OT              OT Recommendation and Plan  Therapy Frequency (OT): 3 times/wk  Plan of Care Review  Plan of Care Reviewed With: patient  Outcome Summary: Pt was found supine in bed c shawn wrist restraints on, pt on room air. Pt initially not oriented to self or place and presents c unintelligable speech and diffiuclty following simple commands. Pt begins to speak more clearly with initiation of mobility. Neurology MD enters for assessmnet. Pt sits EOB c Max A and Max verbal and visual cues, initially c strong posterior lean but is able to progress to CGA  at times. Sit<>stand c Max A, severe retropulsive posture upon standing as he demo's poor proprioception and awareness of midline. Attempts to take lateral steps towards HOB c cues and MaxA but is limited by impaired coordination,strength, and cognition. Pt returns to supine, restraints placed, HOB fully elevated to simulate being in chair position. At departure, pt is able to state that he is at [facility].     Time Calculation:   Time Calculation- OT     Row Name 09/10/21 1538             Time Calculation- OT    OT Start Time  1347  -JW      OT Stop Time  1419  -JW      OT Time Calculation (min)  32 min  -      Total Timed Code Minutes- OT  32 minute(s)  -JW      OT Received On  09/10/21  -      OT - Next Appointment  09/13/21  -         Timed Charges     - OT Therapeutic Activity Minutes  32  -JW         Total Minutes    Timed Charges Total Minutes  32  -       Total Minutes  32  -        User Key  (r) = Recorded By, (t) = Taken By, (c) = Cosigned By    Initials Name Provider Type    Hermelinda Dillard OT Occupational Therapist        Therapy Charges for Today     Code Description Service Date Service Provider Modifiers Qty    [id number]  OT THERAPEUTIC ACT EA 15 MIN 9/10/2021 Hermelinda Moss OT GO 2               Hermelinda Moss OT  9/10/2021

## 2021-09-10 NOTE — CASE MANAGEMENT/SOCIAL WORK
Continued Stay Note  [facility]     Patient Name: Jake Kan  MRN: [id number]  Today's Date: 9/10/2021    Admit Date: 9/2/2021    Discharge Plan     Row Name 09/10/21 1009       Plan    Plan  SNF referrals pending    Plan Comments  Per Lili they have no beds and she is unsure when they will have beds. Referral sent to [facility] in [place] and left VM with Azra regarding referral. Melissa Tobar RN CCP        Discharge Codes    No documentation.       Expected Discharge Date and Time     Expected Discharge Date Expected Discharge Time    Sep 10, 2021             Melissa Tobar RN

## 2021-09-10 NOTE — PLAN OF CARE
Goal Outcome Evaluation:  Plan of Care Reviewed With: patient           Outcome Summary: upon entry, pt moving legs about, trying to put over EOB LLE, pt did settle down , and perf LE strengthening exer, speech garbled at times, plans SN[place] at [place], unsafe to attempt tfers, standing this visit    Patient was wearing a face mask during this therapy encounter. Therapist used appropriate personal protective equipment including eye protection, mask, and gloves.  Mask used was N95/duckbill. Appropriate PPE was worn during the entire therapy session. Hand hygiene was completed before and after therapy session. Patient is in enhanced droplet precautions.

## 2021-09-10 NOTE — CONSULTS
"Neurology Consult Note    Consult Date: 9/10/2021    Referring MD: Dr. Kiran    Reason for Consult I have been asked to see the patient in neurological consultation to render advice and opinion regarding persistent encephalopathy    Jake Kan is a 76 y.o. male with no significant past medical history who was previously living independently at home.  He was diagnosed with Covid 19 on 8/21 and had been recovering at home.  He was found down at home by his neighbor and brought to the emergency department and admitted for COVID-19 pneumonia.  He has been admitted and under treatment for Covid for the last week.  His mental status has remained persistently impaired with fluctuating disorientation and inattention.  I was asked to evaluate him for alternative causes of this.    History reviewed. No pertinent past medical history.    ROS:  Unable to obtain due to encephalopathy    Exam  /79 (BP Location: Left arm, Patient Position: Lying)   Pulse 75   Temp 98.4 °F (36.9 °C) (Oral)   Resp 18   Ht 180.3 cm (71\")   Wt 65.8 kg (145 lb 1.6 oz)   SpO2 94%   BMI 20.24 kg/m²   Gen: NAD, vitals reviewed  MS: He can tell me the year but not the month, he knows he has been in the hospital for about 1 week.  He cannot tell me his age.  Severe fluctuating inattention.  No aphasia or neglect.  CN: visual acuity grossly normal, PERRL, EOMI, no facial droop, moderate to severe dysarthria  Motor: 5/5 throughout upper and lower extremities, normal tone  Gait: 1 person assist to stand.  He cannot maintain upright posture and leans backward quickly has to sit down.    DATA:    Lab Results   Component Value Date    GLUCOSE 82 09/10/2021    CALCIUM 8.9 09/10/2021     09/10/2021    K 3.7 09/10/2021    CO2 23.2 09/10/2021     (H) 09/10/2021    BUN 30 (H) 09/10/2021    CREATININE 0.99 09/10/2021    EGFRIFAFRI 71 08/07/2019    EGFRIFNONA 73 09/10/2021    BCR 30.3 (H) 09/10/2021    ANIONGAP 11.8 09/10/2021     Lab Results "   Component Value Date    WBC 11.52 (H) 09/10/2021    HGB 14.3 09/10/2021    HCT 43.1 09/10/2021    MCV 93.7 09/10/2021     09/10/2021       Lab review: WBC 11.5, GFR normal    Imaging review: I personally reviewed his head CT performed yesterday which shows moderate generalized atrophy with prominent bilateral medial temporal atrophy.  Radiology report reviewed    Diagnoses:  Delirium  COVID-19 pneumonia    Comment: History and exam consistent with hospital delirium.  I would recommend continuing to work towards mobilizing him and frequent reorientation.  I do not see focal exam findings to suggest stroke and I do not feel that MRI is indicated at this time.    PLAN:  Limit sedating medications  Frequent reorientation by nursing staff  Mobilize 3 times daily if possible    Discussed with Dr. Kiran.  I would expect him to improve especially once he is out of precautions and can be more frequently up out of bed and have visitors.

## 2021-09-10 NOTE — PROGRESS NOTES
[facility]     LOS: 8 days     Subjective:    CC:Altered Mental Status    DIET:  Diet Order   Procedures   • Diet Regular; Nectar / Syrup Thick     Not reliable historian  Ate 25 % of food today  Still in restraints  Objective:    Vital Signs:  Temp:  [97.2 °F (36.2 °C)-98.6 °F (37 °C)] 98.4 °F (36.9 °C)  Heart Rate:  [51-75] 75  Resp:  [18-20] 18  BP: (113-157)/(69-87) 141/79    SpO2:  [94 %-99 %] 94 %  on   ;   Device (Oxygen Therapy): room air  Body mass index is 20.24 kg/m².    Physical Exam  HENT:      Head: Normocephalic and atraumatic.   Cardiovascular:      Heart sounds: No murmur heard.   No friction rub.   Pulmonary:      Effort: Pulmonary effort is normal.      Breath sounds: Normal breath sounds.   Abdominal:      General: Bowel sounds are normal. There is no distension.      Palpations: Abdomen is soft.      Tenderness: There is no abdominal tenderness.   Skin:     General: Skin is warm and dry.         Results Review:    Glucose   Date Value Ref Range Status   09/10/2021 82 65 - 99 mg/dL Final   09/09/2021 106 (H) 65 - 99 mg/dL Final     Results from last 7 days   Lab Units 09/10/21  0641   WBC 10*3/mm3 11.52*   HEMOGLOBIN g/dL 14.3   HEMATOCRIT % 43.1   PLATELETS 10*3/mm3 391     Results from last 7 days   Lab Units 09/10/21  0641   SODIUM mmol/L 143   POTASSIUM mmol/L 3.7   CHLORIDE mmol/L 108*   CO2 mmol/L 23.2   BUN mg/dL 30*   CREATININE mg/dL 0.99   CALCIUM mg/dL 8.9   BILIRUBIN mg/dL 1.1   ALK PHOS U/L 68   ALT (SGPT) U/L 75*   AST (SGOT) U/L 38   GLUCOSE mg/dL 82         Results from last 7 days   Lab Units 09/07/21  0455   MAGNESIUM mg/dL 2.5*         Cultures:  Blood Culture   Date Value Ref Range Status   09/02/2021 No growth at 5 days  Final   09/02/2021 No growth at 5 days  Final       I have reviewed daily medications and changes in CPOE    Scheduled meds  dexamethasone, 6 mg, Oral, Daily With Breakfast  enoxaparin, 40 mg, Subcutaneous, Q24H  OLANZapine, 7.5  mg, Oral, Nightly  pantoprazole, 40 mg, Oral, QAM  sodium chloride, 3 mL, Intravenous, Q12H           PRN meds  •  acetaminophen **OR** acetaminophen **OR** acetaminophen  •  docusate sodium  •  famotidine  •  melatonin  •  nitroglycerin  •  ondansetron **OR** ondansetron  •  [COMPLETED] Insert peripheral IV **AND** sodium chloride  •  sodium chloride        Pneumonia due to COVID-19 virus    Debility    Metabolic encephalopathy    Dehydration    Hypernatremia    ANA (acute kidney injury) (CMS/[facility])    Stage 3a chronic kidney disease (CMS/HCC)    Positive D-dimer    Acute cystitis    Hypoxia        Assessment/Plan:  COVID-19 Pneumonia with Hypoxia  - diagnosed 8/21/21, patient is unvaccinated  - hypoxic on admission-this is improved  - continue decadron  - monitor inflammatory markers, LFTs, and renal function  - has elevated d-dimer, will follow-BLE venous dopplers are negative-cannot have CTA chest due to renal function, perfusion scan is normal  - encourage pulmonary toilet-he is on room air now     ANA on Stage 3a CKD  - likely pre-renal due to dehydration, also has hypernatremia  - Cr and sodium have improved, no e/o urine retention     UTI  - outpatient urine culture grew enterococcus, PCN sensitive  - finished course of ampicillin     Metabolic Encephalopathy/Delirium  - probably due to above issues, no focal deficits  - monitor with above treatment and redirect as needed  - has been quite restless which is likely causing some delirium on top of this-continue nightly zyprexa; hopefully will improve once off of steroids  - I suspect some dementia at baseline but unfortunately baseline is unknown-check head CT neg; B12 and TSH are okay   -now that covid is negative, try to mobilize     Lovenox 40 mg SC daily for DVT prophylaxis.  Full code.  Discussed with patient, nursing staff and care team on multidisciplinary rounds.    D/w Dr Brandy Kiran MD  09/10/21  13:46 EDT

## 2021-09-11 LAB
D DIMER PPP FEU-MCNC: 1.38 MCGFEU/ML (ref 0–0.49)
SARS-COV-2 RNA RESP QL NAA+PROBE: DETECTED

## 2021-09-11 PROCEDURE — U0003 INFECTIOUS AGENT DETECTION BY NUCLEIC ACID (DNA OR RNA); SEVERE ACUTE RESPIRATORY SYNDROME CORONAVIRUS 2 (SARS-COV-2) (CORONAVIRUS DISEASE [COVID-19]), AMPLIFIED PROBE TECHNIQUE, MAKING USE OF HIGH THROUGHPUT TECHNOLOGIES AS DESCRIBED BY CMS-2020-01-R: HCPCS | Performed by: HOSPITALIST

## 2021-09-11 PROCEDURE — 25010000002 ENOXAPARIN PER 10 MG: Performed by: INTERNAL MEDICINE

## 2021-09-11 PROCEDURE — U0005 INFEC AGEN DETEC AMPLI PROBE: HCPCS | Performed by: HOSPITALIST

## 2021-09-11 PROCEDURE — 63710000001 DEXAMETHASONE PER 0.25 MG: Performed by: INTERNAL MEDICINE

## 2021-09-11 PROCEDURE — 85379 FIBRIN DEGRADATION QUANT: CPT | Performed by: INTERNAL MEDICINE

## 2021-09-11 PROCEDURE — 36415 COLL VENOUS BLD VENIPUNCTURE: CPT | Performed by: INTERNAL MEDICINE

## 2021-09-11 RX ADMIN — OLANZAPINE 7.5 MG: 7.5 TABLET ORAL at 20:12

## 2021-09-11 RX ADMIN — SODIUM CHLORIDE, PRESERVATIVE FREE 10 ML: 5 INJECTION INTRAVENOUS at 20:18

## 2021-09-11 RX ADMIN — ENOXAPARIN SODIUM 40 MG: 40 INJECTION SUBCUTANEOUS at 09:25

## 2021-09-11 RX ADMIN — ACETAMINOPHEN 650 MG: 325 TABLET, FILM COATED ORAL at 21:51

## 2021-09-11 RX ADMIN — Medication 5 MG: at 23:46

## 2021-09-11 RX ADMIN — SODIUM CHLORIDE, PRESERVATIVE FREE 3 ML: 5 INJECTION INTRAVENOUS at 10:50

## 2021-09-11 RX ADMIN — PANTOPRAZOLE SODIUM 40 MG: 40 TABLET, DELAYED RELEASE ORAL at 05:47

## 2021-09-11 RX ADMIN — DEXAMETHASONE 6 MG: 4 TABLET ORAL at 09:25

## 2021-09-11 NOTE — PLAN OF CARE
Goal Outcome Evaluation:  Plan of Care Reviewed With: patient        Progress: no change  Outcome Summary: VSS; NSR on telemetry; currently on room air and tolerating. Pt has developed a frequent non-productive cough. Bilateral soft wrist restraints in use. pt alert but only oriented to self. Seemed to rest better last night; sleeps better with HOB elevated about 30 degrees. will continue to monitor

## 2021-09-11 NOTE — PLAN OF CARE
Goal Outcome Evaluation:  Plan of Care Reviewed With: patient, other (see comments)            Patient second covid test today with positive result,  up in chair with posey vest,  restless, updated brother on condition.   Room air,mech soft, ntl,  assist with feeding.  Medications in applesauce.   Will continue to monitor.

## 2021-09-11 NOTE — NURSING NOTE
Assisted patient back to bed,  Restraints in place  Per physician order.  Resident calm at this time.  Will continue to monitor.

## 2021-09-11 NOTE — PROGRESS NOTES
[facility]     LOS: 9 days     Subjective:    CC:Altered Mental Status    DIET:  Diet Order   Procedures   • Diet Regular; Nectar / Syrup Thick     -Not reliable historian  -eating  -Still in restraints at times, were able to get him up to the chair today    Objective:    Vital Signs:  Temp:  [97.5 °F (36.4 °C)-98.4 °F (36.9 °C)] 98.4 °F (36.9 °C)  Heart Rate:  [50-84] 63  Resp:  [18] 18  BP: ()/(58-84) 94/64    SpO2:  [90 %-97 %] 97 %  on   ;   Device (Oxygen Therapy): room air  Body mass index is 20.24 kg/m².    Physical Exam  HENT:      Head: Normocephalic and atraumatic.   Cardiovascular:      Heart sounds: No murmur heard.   No friction rub.   Pulmonary:      Effort: Pulmonary effort is normal.      Breath sounds: Normal breath sounds.   Abdominal:      General: Bowel sounds are normal. There is no distension.      Palpations: Abdomen is soft.      Tenderness: There is no abdominal tenderness.   Skin:     General: Skin is warm and dry.         Results Review:    Glucose   Date Value Ref Range Status   09/10/2021 82 65 - 99 mg/dL Final   09/09/2021 106 (H) 65 - 99 mg/dL Final     Results from last 7 days   Lab Units 09/10/21  0641   WBC 10*3/mm3 11.52*   HEMOGLOBIN g/dL 14.3   HEMATOCRIT % 43.1   PLATELETS 10*3/mm3 391     Results from last 7 days   Lab Units 09/10/21  0641   SODIUM mmol/L 143   POTASSIUM mmol/L 3.7   CHLORIDE mmol/L 108*   CO2 mmol/L 23.2   BUN mg/dL 30*   CREATININE mg/dL 0.99   CALCIUM mg/dL 8.9   BILIRUBIN mg/dL 1.1   ALK PHOS U/L 68   ALT (SGPT) U/L 75*   AST (SGOT) U/L 38   GLUCOSE mg/dL 82         Results from last 7 days   Lab Units 09/07/21  0455   MAGNESIUM mg/dL 2.5*         Cultures:  Blood Culture   Date Value Ref Range Status   09/02/2021 No growth at 5 days  Final   09/02/2021 No growth at 5 days  Final       I have reviewed daily medications and changes in CPOE    Scheduled meds  dexamethasone, 6 mg, Oral, Daily With Breakfast  enoxaparin, 40  mg, Subcutaneous, Q24H  OLANZapine, 7.5 mg, Oral, Nightly  pantoprazole, 40 mg, Oral, QAM  sodium chloride, 3 mL, Intravenous, Q12H           PRN meds  •  acetaminophen **OR** acetaminophen **OR** acetaminophen  •  docusate sodium  •  famotidine  •  melatonin  •  nitroglycerin  •  ondansetron **OR** ondansetron  •  [COMPLETED] Insert peripheral IV **AND** sodium chloride  •  sodium chloride        Pneumonia due to COVID-19 virus    Debility    Metabolic encephalopathy    Dehydration    Hypernatremia    ANA (acute kidney injury) (CMS/[facility])    Stage 3a chronic kidney disease (CMS/[facility])    Positive D-dimer    Acute cystitis    Hypoxia        Assessment/Plan:  COVID-19 Pneumonia with Hypoxia  - diagnosed 8/21/21, patient is unvaccinated  - hypoxic on admission-this is improved  - continue decadron  - monitor inflammatory markers, LFTs, and renal function  - has elevated d-dimer, will follow-BLE venous dopplers are negative-cannot have CTA chest due to renal function, perfusion scan is normal  - encourage pulmonary toilet  -he is on room air now     ANA on Stage 3a CKD  - likely pre-renal due to dehydration, also has hypernatremia  - Cr and sodium have improved, no e/o urine retention     UTI  - outpatient urine culture grew enterococcus, PCN sensitive  - finished course of ampicillin     Metabolic Encephalopathy/Delirium  - probably due to above issues, no focal deficits  - monitor with above treatment and redirect as needed  - has been quite restless which is likely causing some delirium on top of this-continue nightly zyprexa; hopefully will improve once off of steroids  -Patient has gotten up to chair today he has been agitated at times but seems to be improving   -now that covid is negative, try to mobilize     Lovenox 40 mg SC daily for DVT prophylaxis.  Full code.          Benjamin Kiran MD  09/11/21  16:16 EDT

## 2021-09-11 NOTE — NURSING NOTE
A approximately 1300 patient was removed from wrist restraints.  Assisted by nurse to recliner.  Patient noted with unsteady gait, and confusion.       Currently unable to get patient not to get up unassisted, patient pulling off O2 sensors and telemetry box.  Nurse in room at this time. Patient states he is looking for his keys at this time. Multiple attempts made to redirect patient, unsuccessful.

## 2021-09-12 LAB
ANION GAP SERPL CALCULATED.3IONS-SCNC: 9.7 MMOL/L (ref 5–15)
BASOPHILS # BLD AUTO: 0.02 10*3/MM3 (ref 0–0.2)
BASOPHILS NFR BLD AUTO: 0.3 % (ref 0–1.5)
BUN SERPL-MCNC: 33 MG/DL (ref 8–23)
BUN/CREAT SERPL: 28.4 (ref 7–25)
CALCIUM SPEC-SCNC: 8.5 MG/DL (ref 8.6–10.5)
CHLORIDE SERPL-SCNC: 109 MMOL/L (ref 98–107)
CO2 SERPL-SCNC: 23.3 MMOL/L (ref 22–29)
CREAT SERPL-MCNC: 1.16 MG/DL (ref 0.76–1.27)
DEPRECATED RDW RBC AUTO: 43.2 FL (ref 37–54)
EOSINOPHIL # BLD AUTO: 0.1 10*3/MM3 (ref 0–0.4)
EOSINOPHIL NFR BLD AUTO: 1.4 % (ref 0.3–6.2)
ERYTHROCYTE [DISTWIDTH] IN BLOOD BY AUTOMATED COUNT: 12.8 % (ref 12.3–15.4)
GFR SERPL CREATININE-BSD FRML MDRD: 61 ML/MIN/1.73
GLUCOSE SERPL-MCNC: 100 MG/DL (ref 65–99)
HCT VFR BLD AUTO: 38 % (ref 37.5–51)
HGB BLD-MCNC: 12.9 G/DL (ref 13–17.7)
IMM GRANULOCYTES # BLD AUTO: 0.06 10*3/MM3 (ref 0–0.05)
IMM GRANULOCYTES NFR BLD AUTO: 0.8 % (ref 0–0.5)
LYMPHOCYTES # BLD AUTO: 1.51 10*3/MM3 (ref 0.7–3.1)
LYMPHOCYTES NFR BLD AUTO: 21.3 % (ref 19.6–45.3)
MCH RBC QN AUTO: 31.3 PG (ref 26.6–33)
MCHC RBC AUTO-ENTMCNC: 33.9 G/DL (ref 31.5–35.7)
MCV RBC AUTO: 92.2 FL (ref 79–97)
MONOCYTES # BLD AUTO: 0.62 10*3/MM3 (ref 0.1–0.9)
MONOCYTES NFR BLD AUTO: 8.8 % (ref 5–12)
NEUTROPHILS NFR BLD AUTO: 4.77 10*3/MM3 (ref 1.7–7)
NEUTROPHILS NFR BLD AUTO: 67.4 % (ref 42.7–76)
NRBC BLD AUTO-RTO: 0 /100 WBC (ref 0–0.2)
PLATELET # BLD AUTO: 344 10*3/MM3 (ref 140–450)
PMV BLD AUTO: 10.7 FL (ref 6–12)
POTASSIUM SERPL-SCNC: 3.5 MMOL/L (ref 3.5–5.2)
RBC # BLD AUTO: 4.12 10*6/MM3 (ref 4.14–5.8)
SARS-COV-2 RNA RESP QL NAA+PROBE: DETECTED
SODIUM SERPL-SCNC: 142 MMOL/L (ref 136–145)
WBC # BLD AUTO: 7.08 10*3/MM3 (ref 3.4–10.8)

## 2021-09-12 PROCEDURE — U0003 INFECTIOUS AGENT DETECTION BY NUCLEIC ACID (DNA OR RNA); SEVERE ACUTE RESPIRATORY SYNDROME CORONAVIRUS 2 (SARS-COV-2) (CORONAVIRUS DISEASE [COVID-19]), AMPLIFIED PROBE TECHNIQUE, MAKING USE OF HIGH THROUGHPUT TECHNOLOGIES AS DESCRIBED BY CMS-2020-01-R: HCPCS | Performed by: HOSPITALIST

## 2021-09-12 PROCEDURE — 80048 BASIC METABOLIC PNL TOTAL CA: CPT | Performed by: HOSPITALIST

## 2021-09-12 PROCEDURE — 85025 COMPLETE CBC W/AUTO DIFF WBC: CPT | Performed by: HOSPITALIST

## 2021-09-12 PROCEDURE — 25010000002 ENOXAPARIN PER 10 MG: Performed by: INTERNAL MEDICINE

## 2021-09-12 PROCEDURE — 63710000001 DEXAMETHASONE PER 0.25 MG: Performed by: INTERNAL MEDICINE

## 2021-09-12 PROCEDURE — U0005 INFEC AGEN DETEC AMPLI PROBE: HCPCS | Performed by: HOSPITALIST

## 2021-09-12 RX ADMIN — DEXAMETHASONE 6 MG: 4 TABLET ORAL at 08:40

## 2021-09-12 RX ADMIN — OLANZAPINE 7.5 MG: 7.5 TABLET ORAL at 20:34

## 2021-09-12 RX ADMIN — ENOXAPARIN SODIUM 40 MG: 40 INJECTION SUBCUTANEOUS at 08:40

## 2021-09-12 RX ADMIN — SODIUM CHLORIDE, PRESERVATIVE FREE 3 ML: 5 INJECTION INTRAVENOUS at 08:42

## 2021-09-12 RX ADMIN — PANTOPRAZOLE SODIUM 40 MG: 40 TABLET, DELAYED RELEASE ORAL at 05:24

## 2021-09-12 RX ADMIN — SODIUM CHLORIDE, PRESERVATIVE FREE 3 ML: 5 INJECTION INTRAVENOUS at 20:34

## 2021-09-12 NOTE — PROGRESS NOTES
[facility]     LOS: 10 days     Subjective:    CC:Altered Mental Status    DIET:  Diet Order   Procedures   • Diet Regular; Nectar / Syrup Thick     -Not reliable historian  -eating some, 50% of lunch  -Currently in and out of restraints attempting to keep out of restraints    Objective:    Vital Signs:  Temp:  [97.4 °F (36.3 °C)-97.9 °F (36.6 °C)] 97.4 °F (36.3 °C)  Heart Rate:  [64-97] 64  Resp:  [18] 18  BP: ()/(58-77) 132/67    SpO2:  [92 %-96 %] 96 %  on   ;   Device (Oxygen Therapy): room air  Body mass index is 20.24 kg/m².    Physical Exam  HENT:      Head: Normocephalic and atraumatic.   Cardiovascular:      Heart sounds: No murmur heard.   No friction rub.   Pulmonary:      Effort: Pulmonary effort is normal.      Breath sounds: Normal breath sounds.   Abdominal:      General: Bowel sounds are normal. There is no distension.      Palpations: Abdomen is soft.      Tenderness: There is no abdominal tenderness.   Skin:     General: Skin is warm and dry.         Results Review:    Glucose   Date Value Ref Range Status   09/12/2021 100 (H) 65 - 99 mg/dL Final   09/10/2021 82 65 - 99 mg/dL Final   09/09/2021 106 (H) 65 - 99 mg/dL Final     Results from last 7 days   Lab Units 09/12/21  0610   WBC 10*3/mm3 7.08   HEMOGLOBIN g/dL 12.9*   HEMATOCRIT % 38.0   PLATELETS 10*3/mm3 344     Results from last 7 days   Lab Units 09/12/21  0610 09/10/21  0641 09/10/21  0641   SODIUM mmol/L 142   < > 143   POTASSIUM mmol/L 3.5   < > 3.7   CHLORIDE mmol/L 109*   < > 108*   CO2 mmol/L 23.3   < > 23.2   BUN mg/dL 33*   < > 30*   CREATININE mg/dL 1.16   < > 0.99   CALCIUM mg/dL 8.5*   < > 8.9   BILIRUBIN mg/dL  --   --  1.1   ALK PHOS U/L  --   --  68   ALT (SGPT) U/L  --   --  75*   AST (SGOT) U/L  --   --  38   GLUCOSE mg/dL 100*   < > 82    < > = values in this interval not displayed.         Results from last 7 days   Lab Units 09/07/21  0455   MAGNESIUM mg/dL 2.5*         Cultures:  Blood  Culture   Date Value Ref Range Status   09/02/2021 No growth at 5 days  Final   09/02/2021 No growth at 5 days  Final       I have reviewed daily medications and changes in CPOE    Scheduled meds  dexamethasone, 6 mg, Oral, Daily With Breakfast  enoxaparin, 40 mg, Subcutaneous, Q24H  OLANZapine, 7.5 mg, Oral, Nightly  pantoprazole, 40 mg, Oral, QAM  sodium chloride, 3 mL, Intravenous, Q12H           PRN meds  •  acetaminophen **OR** acetaminophen **OR** acetaminophen  •  docusate sodium  •  famotidine  •  melatonin  •  nitroglycerin  •  ondansetron **OR** ondansetron  •  [COMPLETED] Insert peripheral IV **AND** sodium chloride  •  sodium chloride        Pneumonia due to COVID-19 virus    Debility    Metabolic encephalopathy    Dehydration    Hypernatremia    ANA (acute kidney injury) (CMS/HCC)    Stage 3a chronic kidney disease (CMS/[facility])    Positive D-dimer    Acute cystitis    Hypoxia        Assessment/Plan:  COVID-19 Pneumonia with Hypoxia  - diagnosed 8/21/21, patient is unvaccinated  - hypoxic on admission-this is improved  -Finish 10 days of Decadron  - monitor inflammatory markers, LFTs, and renal function  - has elevated d-dimer, will follow-BLE venous dopplers are negative-cannot have CTA chest due to renal function, perfusion scan is normal, continue with low-dose Lovenox  - encourage pulmonary toilet  -he is on room air now     ANA on Stage 3a CKD  - likely pre-renal due to dehydration, also has hypernatremia  - Cr and sodium have improved, no e/o urine retention     UTI  - outpatient urine culture grew enterococcus, PCN sensitive  - finished course of ampicillin     Metabolic Encephalopathy/Delirium  - probably due to above issues, no focal deficits  - monitor with above treatment and redirect as needed  -Delirium seems to be improving, continue with Zyprexa, coming off of steroids should help  -Patient has gotten up to chair today he has been agitated at times but seems to be improving   -Repeat Covid  yesterday was positive repeat today pending     Lovenox 40 mg SC daily for DVT prophylaxis.  Full code.          Benjamin Kiran MD  09/12/21  13:23 EDT

## 2021-09-12 NOTE — PLAN OF CARE
Goal Outcome Evaluation:  Plan of Care Reviewed With: patient, other (see comments)      Awaiting results of covid swab,   patient in bed sleeping at this time. Attempting to remove from restraints.VSS,  medications given in applesauce. Assisted with meals by staff.  Lots of redirection needed.     Continues on lovenox and dexamethasone as ordered.  Confusion noted, unable to state where he is, or time of day.  Oriented frequently by this nurse.   Will continue to monitor.

## 2021-09-12 NOTE — PLAN OF CARE
Goal Outcome Evaluation:  Plan of Care Reviewed With: patient        Progress: no change  Outcome Summary: VSS; NSR / SB on telemetry; still on room air and tolerating. Bilateral soft wrist restraints in use. performed ROM and Q2 turns. pt still has moments of extreme restlessness at times, kicking his legs off the bed and trying to sit up. I find that he does better and rest more if he remains at about 30 degrees. Pt alert to self only but will answer questions appropriatley at times. He did C/O having intermintent back pain; however he is not always in a state that he can verbalize this. I gave tylenol and it seemed to help. will continue to monitor

## 2021-09-13 LAB — D DIMER PPP FEU-MCNC: 1.86 MCGFEU/ML (ref 0–0.49)

## 2021-09-13 PROCEDURE — 25010000002 ENOXAPARIN PER 10 MG: Performed by: INTERNAL MEDICINE

## 2021-09-13 PROCEDURE — 85379 FIBRIN DEGRADATION QUANT: CPT | Performed by: INTERNAL MEDICINE

## 2021-09-13 PROCEDURE — 97110 THERAPEUTIC EXERCISES: CPT

## 2021-09-13 PROCEDURE — 36415 COLL VENOUS BLD VENIPUNCTURE: CPT | Performed by: INTERNAL MEDICINE

## 2021-09-13 PROCEDURE — 97530 THERAPEUTIC ACTIVITIES: CPT

## 2021-09-13 PROCEDURE — 97535 SELF CARE MNGMENT TRAINING: CPT

## 2021-09-13 RX ADMIN — ENOXAPARIN SODIUM 40 MG: 40 INJECTION SUBCUTANEOUS at 08:11

## 2021-09-13 RX ADMIN — SODIUM CHLORIDE, PRESERVATIVE FREE 3 ML: 5 INJECTION INTRAVENOUS at 20:00

## 2021-09-13 RX ADMIN — SODIUM CHLORIDE, PRESERVATIVE FREE 3 ML: 5 INJECTION INTRAVENOUS at 08:21

## 2021-09-13 RX ADMIN — OLANZAPINE 7.5 MG: 7.5 TABLET ORAL at 20:00

## 2021-09-13 RX ADMIN — PANTOPRAZOLE SODIUM 40 MG: 40 TABLET, DELAYED RELEASE ORAL at 06:53

## 2021-09-13 NOTE — PLAN OF CARE
Goal Outcome Evaluation:  Plan of Care Reviewed With: patient        Progress: improving  Outcome Summary: Pt demonstrating good progress with PT able to complete 10 reps therapeutic exercises and ambulate 4ft to chair with minAx1 HHA, however demonstrates poor understading of verbal commands requiring tactile cues for completion of tasks. Pt O2 sat >90% throughout session. Pt continues to demonstrate impaired strength, balance, and gait and could continue to benefit from skilled PT to address deficits to improve functional mobility.  Patient was not wearing a face mask during this therapy encounter. Therapist used appropriate personal protective equipment including gown, eye protection, hair cover, mask and gloves.  Mask used was N95/duckbill. Appropriate PPE was worn during the entire therapy session. Hand hygiene was completed before and after therapy session. Patient is in enhanced droplet precautions.

## 2021-09-13 NOTE — THERAPY TREATMENT NOTE
Patient Name: Jake Kan  : 1945    MRN: [id number]                              Today's Date: 2021       Admit Date: 2021    Visit Dx:     ICD-10-CM ICD-9-CM   1. Pneumonia due to COVID-19 virus  U07.1 480.8    J12.82 079.89   2. Acute kidney injury (ANA) with acute tubular necrosis (ATN) (CMS/HCC)  N17.0 584.5   3. Acute UTI  N39.0 599.0   4. Altered mental status, unspecified altered mental status type  R41.82 780.97     Patient Active Problem List   Diagnosis   • Sciatica of right side   • Phlegm in throat   • Heartburn   • Abnormal EKG   • Debility   • Hyperlipidemia   • Other symptoms involving digestive system   • Paresthesias   • Pruritus   • Rosacea   • Seborrheic dermatitis   • Pneumonia due to COVID-19 virus   • Metabolic encephalopathy   • Dehydration   • Hypernatremia   • ANA (acute kidney injury) (CMS/HCC)   • Stage 3a chronic kidney disease (CMS/HCC)   • Positive D-dimer   • Acute cystitis   • Hypoxia     History reviewed. No pertinent past medical history.  Past Surgical History:   Procedure Laterality Date   • HERNIA REPAIR     • UMBILICAL HERNIA REPAIR N/A 2019    Procedure: excision of umbilical hernia mesh and umbilical hernia repair;  Surgeon: Luan Dowell MD;  Location: [facility];  Service: General   • WRIST FRACTURE SURGERY Left      General Information     Row Name 21 1149          Physical Therapy Time and Intention    Document Type  therapy note (daily note)  -SR     Mode of Treatment  physical therapy;individual therapy  -SR     Row Name 21 1149          General Information    Patient Profile Reviewed  yes  -SR     Existing Precautions/Restrictions  fall  -SR     Row Name 21 1149          Cognition    Orientation Status (Cognition)  oriented to;person  -SR     Row Name 21 1149          Safety Issues, Functional Mobility    Safety Issues Affecting Function (Mobility)  ability to follow commands;at risk behavior  observed;sequencing abilities  -SR     Impairments Affecting Function (Mobility)  balance;cognition;coordination;endurance/activity tolerance;strength;motor planning;postural/trunk control;visual/perceptual  -SR     Cognitive Impairments, Mobility Safety/Performance  problem-solving/reasoning;sequencing abilities  -SR     Comment, Safety Issues/Impairments (Mobility)  gait belt; non slip socks for safety  -SR       User Key  (r) = Recorded By, (t) = Taken By, (c) = Cosigned By    Initials Name Provider Type    Morena Apodaca Physical Therapist        Mobility     Row Name 09/13/21 1150          Bed Mobility    Bed Mobility  supine-sit  -SR     Supine-Sit [place] (Bed Mobility)  standby assist;1 person assist  -SR     Comment (Bed Mobility)  requires additional time to complete and cues for sequencing task. responds to tactile cues better than verbal cues  -SR     Row Name 09/13/21 1150          Transfers    Comment (Transfers)  sit <> stand without AD CGA with cues for trunk/hip extension upon standing  -SR     Row Name 09/13/21 1150          Sit-Stand Transfer    Sit-Stand [place] (Transfers)  contact guard;1 person assist;verbal cues  -SR     Row Name 09/13/21 1150          Gait/Stairs (Locomotion)    [place] Level (Gait)  minimum assist (75% patient effort);1 person assist;verbal cues  -SR     Assistive Device (Gait)  -- HHA  -SR     Distance in Feet (Gait)  4ft  -SR     Deviations/Abnormal Patterns (Gait)  base of support, narrow;elijah decreased;festinating/shuffling;gait speed decreased;stride length decreased  -SR     Bilateral Gait Deviations  forward flexed posture  -SR     Comment (Gait/Stairs)  Pt ambulated 4ft with Prince HHA requiring tactile cues for directions and initiation of gait. no lob, distance limited by activity tolerance. O2 >90% on RA  -SR       User Key  (r) = Recorded By, (t) = Taken By, (c) = Cosigned By    Initials Name Provider Type    Morena Apodaca Physical Therapist         Obj/Interventions     [facility] Name 09/13/21 1153          Motor Skills    Therapeutic Exercise  -- 10x ankle pumps, SAQ, heel slides, LAQ with cues for body mechanics  -SR     [facility] Name 09/13/21 1153          Balance    Balance Assessment  sitting static balance;sitting dynamic balance;standing static balance;standing dynamic balance  -SR     Static Sitting Balance  WFL;sitting, edge of bed  -SR     Dynamic Sitting Balance  mild impairment;sitting, edge of bed  -SR     Static Standing Balance  mild impairment;unsupported  -SR     Dynamic Standing Balance  mild impairment;unsupported  -SR     Comment, Balance  SBA for sitting balance; CGA/Prince HHA for standing balance  -SR       User Key  (r) = Recorded By, (t) = Taken By, (c) = Cosigned By    Initials Name Provider Type    SR Morena Arrington Physical Therapist        Goals/Plan    No documentation.       Clinical Impression     Row Name 09/13/21 1154          Pain    Additional Documentation  Pain Scale: Numbers Pre/Post-Treatment (Group)  -SR     Row Name 09/13/21 1154          Pain Scale: Numbers Pre/Post-Treatment    Pretreatment Pain Rating  0/10 - no pain  -SR     Row Name 09/13/21 1156          Plan of Care Review    Plan of Care Reviewed With  patient  -SR     Progress  improving  -SR     Outcome Summary  Pt demonstrating good progress with PT able to complete 10 reps therapeutic exercises and ambulate 4ft to chair with minAx1 HHA, however demonstrates poor understading of verbal commands requiring tactile cues for completion of tasks. Pt O2 sat >90% throughout session. Pt continues to demonstrate impaired strength, balance, and gait and could continue to benefit from skilled PT to address deficits to improve functional mobility.  -SR     [facility] Name 09/13/21 2167          Therapy Assessment/Plan (PT)    Rehab Potential (PT)  good, to achieve stated therapy goals  -SR     Criteria for Skilled Interventions Met (PT)  yes;meets criteria;skilled treatment is  necessary  -SR     Row Name 09/13/21 1154          Positioning and Restraints    Pre-Treatment Position  in bed  -SR     Post Treatment Position  chair  -SR     In Chair  reclined;call light within reach;encouraged to call for assist;exit alarm on;notified nsg;legs elevated;RUE elevated;LUE elevated  -SR       User Key  (r) = Recorded By, (t) = Taken By, (c) = Cosigned By    Initials Name Provider Type    Morena Apodaca Physical Therapist        Outcome Measures     Row Name 09/13/21 1157 09/13/21 0000       How much help from another person do you currently need...    Turning from your back to your side while in flat bed without using bedrails?  3  -SR  3  -MS    Moving from lying on back to sitting on the side of a flat bed without bedrails?  3  -SR  2  -MS    Moving to and from a bed to a chair (including a wheelchair)?  3  -SR  1  -MS    Standing up from a chair using your arms (e.g., wheelchair, bedside chair)?  3  -SR  1  -MS    Climbing 3-5 steps with a railing?  2  -SR  1  -MS    To walk in hospital room?  3  -SR  1  -MS    AM-PAC 6 Clicks Score (PT)  17  -SR  9  -MS    Row Name 09/13/21 1157          Modified Wan Scale    Modified Walker Scale  4 - Moderately severe disability.  Unable to walk without assistance, and unable to attend to own bodily needs without assistance.  -SR     Row Name 09/13/21 1157          Functional Assessment    Outcome Measure Options  AM-PAC 6 Clicks Basic Mobility (PT)  -SR       User Key  (r) = Recorded By, (t) = Taken By, (c) = Cosigned By    Initials Name Provider Type    Brittany Tubbs, RN Registered Nurse    Morena Apodaca Physical Therapist                       Physical Therapy Education                 Title: PT OT SLP Therapies (In Progress)     Topic: Physical Therapy (In Progress)     Point: Mobility training (In Progress)     Learning Progress Summary           Patient Acceptance, E, NR by SR at 9/13/2021 1157    Acceptance, E,D, NR by YAKELIN at 9/9/2021 8078     Acceptance, E,D, NR by YAYO at 9/6/2021 1022    Acceptance, E, NR by DJ at 9/3/2021 1242                   Point: Home exercise program (In Progress)     Learning Progress Summary           Patient Acceptance, E, NR by SR at 9/13/2021 1157    Acceptance, E,D, NR by YAKELIN at 9/9/2021 1655    Acceptance, E,D, NR by YAYO at 9/6/2021 1022                   Point: Body mechanics (In Progress)     Learning Progress Summary           Patient Acceptance, E, NR by SR at 9/13/2021 1157    Acceptance, E,D, NR by YAKELIN at 9/9/2021 1655    Acceptance, E,D, NR by YAYO at 9/6/2021 1022    Acceptance, E, NR by DJ at 9/3/2021 1242                   Point: Precautions (In Progress)     Learning Progress Summary           Patient Acceptance, E, NR by SR at 9/13/2021 1157    Acceptance, E,D, NR by YAKELIN at 9/9/2021 1655    Acceptance, E,D, NR by YAYO at 9/6/2021 1022    Acceptance, E, NR by SOFI at 9/3/2021 1242                               User Key     Initials Effective Dates Name Provider Type Discipline     03/07/18 -  Sidra Chris, PTA Physical Therapy Assistant PT     07/02/20 -  Hadley Coe, PT DPT Physical Therapist PT    DJ 10/25/19 -  Dinora Torres PT Physical Therapist PT    SR 02/08/21 -  Morena Arrington Physical Therapist PT              PT Recommendation and Plan     Plan of Care Reviewed With: patient  Progress: improving  Outcome Summary: Pt demonstrating good progress with PT able to complete 10 reps therapeutic exercises and ambulate 4ft to chair with minAx1 HHA, however demonstrates poor understading of verbal commands requiring tactile cues for completion of tasks. Pt O2 sat >90% throughout session. Pt continues to demonstrate impaired strength, balance, and gait and could continue to benefit from skilled PT to address deficits to improve functional mobility.     Time Calculation:   PT Charges     Row Name 09/13/21 1158             Time Calculation    Start Time  1001  -SR      Stop Time  1025  -SR      Time Calculation  (min)  24 min  -SR      PT Received On  09/13/21  -SR      PT - Next Appointment  09/15/21  -SR      PT Goal Re-Cert Due Date  09/17/21  -SR         Time Calculation- PT    Total Timed Code Minutes- PT  24 minute(s)  -SR        User Key  (r) = Recorded By, (t) = Taken By, (c) = Cosigned By    Initials Name Provider Type    SR Morena Arrington Physical Therapist        Therapy Charges for Today     Code Description Service Date Service Provider Modifiers Qty    36390902875 HC PT THERAPEUTIC ACT EA 15 MIN 9/13/2021 Morena Arrington GP 2    61821533545 HC PT THER PROC EA 15 MIN 9/13/2021 Morena Arrington GP 1          PT G-Codes  Outcome Measure Options: AM-PAC 6 Clicks Basic Mobility (PT)  AM-PAC 6 Clicks Score (PT): 17  AM-PAC 6 Clicks Score (OT): 10  Modified Hettinger Scale: 4 - Moderately severe disability.  Unable to walk without assistance, and unable to attend to own bodily needs without assistance.    Morena Arrington  9/13/2021

## 2021-09-13 NOTE — PROGRESS NOTES
[facility]     LOS: 11 days     Subjective:    CC:Altered Mental Status    DIET:  Diet Order   Procedures   • Diet Regular; Nectar / Syrup Thick     -Not reliable historian  -eating some  -Currently out of restrants    Objective:    Vital Signs:  Temp:  [97.5 °F (36.4 °C)-98 °F (36.7 °C)] 97.9 °F (36.6 °C)  Heart Rate:  [50-72] 72  Resp:  [18] 18  BP: ()/(61-74) 98/63    SpO2:  [92 %-96 %] 92 %  on   ;   Device (Oxygen Therapy): room air  Body mass index is 20.24 kg/m².    Physical Exam  Constitutional:       Appearance: Normal appearance.   HENT:      Head: Normocephalic and atraumatic.   Cardiovascular:      Rate and Rhythm: Normal rate and regular rhythm.      Heart sounds: No murmur heard.   No friction rub.   Pulmonary:      Effort: Pulmonary effort is normal.      Breath sounds: Normal breath sounds.   Abdominal:      General: Bowel sounds are normal. There is no distension.      Palpations: Abdomen is soft.      Tenderness: There is no abdominal tenderness.   Skin:     General: Skin is warm and dry.   Neurological:      Mental Status: He is alert.      Comments: Good long term memory, poor short term memory   Psychiatric:         Mood and Affect: Mood normal.         Behavior: Behavior normal.         Results Review:    Glucose   Date Value Ref Range Status   09/12/2021 100 (H) 65 - 99 mg/dL Final     Results from last 7 days   Lab Units 09/12/21  0610   WBC 10*3/mm3 7.08   HEMOGLOBIN g/dL 12.9*   HEMATOCRIT % 38.0   PLATELETS 10*3/mm3 344     Results from last 7 days   Lab Units 09/12/21  0610 09/10/21  0641 09/10/21  0641   SODIUM mmol/L 142   < > 143   POTASSIUM mmol/L 3.5   < > 3.7   CHLORIDE mmol/L 109*   < > 108*   CO2 mmol/L 23.3   < > 23.2   BUN mg/dL 33*   < > 30*   CREATININE mg/dL 1.16   < > 0.99   CALCIUM mg/dL 8.5*   < > 8.9   BILIRUBIN mg/dL  --   --  1.1   ALK PHOS U/L  --   --  68   ALT (SGPT) U/L  --   --  75*   AST (SGOT) U/L  --   --  38   GLUCOSE mg/dL  100*   < > 82    < > = values in this interval not displayed.         Results from last 7 days   Lab Units 09/07/21  0455   MAGNESIUM mg/dL 2.5*         Cultures:  Blood Culture   Date Value Ref Range Status   09/02/2021 No growth at 5 days  Final   09/02/2021 No growth at 5 days  Final       I have reviewed daily medications and changes in CPOE    Scheduled meds  enoxaparin, 40 mg, Subcutaneous, Q24H  OLANZapine, 7.5 mg, Oral, Nightly  pantoprazole, 40 mg, Oral, QAM  sodium chloride, 3 mL, Intravenous, Q12H           PRN meds  •  acetaminophen **OR** acetaminophen **OR** acetaminophen  •  docusate sodium  •  famotidine  •  melatonin  •  nitroglycerin  •  ondansetron **OR** ondansetron  •  [COMPLETED] Insert peripheral IV **AND** sodium chloride  •  sodium chloride        Pneumonia due to COVID-19 virus    Debility    Metabolic encephalopathy    Dehydration    Hypernatremia    ANA (acute kidney injury) (CMS/HCC)    Stage 3a chronic kidney disease (CMS/[facility])    Positive D-dimer    Acute cystitis    Hypoxia        Assessment/Plan:  COVID-19 Pneumonia with Hypoxia  - diagnosed 8/21/21, patient is unvaccinated  - hypoxic on admission-this is improved  -Finish 10 days of Decadron  - has elevated d-dimer, will follow-BLE venous dopplers are negative-cannot have CTA chest due to renal function, perfusion scan is normal, continue with low-dose Lovenox  - encourage pulmonary toilet  -he is on room air now     ANA on Stage 3a CKD  - likely pre-renal due to dehydration, also has hypernatremia  - Cr and sodium have improved, no e/o urine retention     UTI  - outpatient urine culture grew enterococcus, PCN sensitive  - finished course of ampicillin     Metabolic Encephalopathy/Delirium  - probably due to above issues, no focal deficits  - monitor with above treatment and redirect as needed  -Delirium seems to be improving, continue with Zyprexa, coming off of steroids should help  -Patient has gotten up to chair today he has been  agitated at times but seems to be improving   -Repeat Covid    Lovenox 40 mg SC daily for DVT prophylaxis.  Full code.    D/w nurse    Benjamin Kiran MD  09/13/21  13:59 EDT

## 2021-09-13 NOTE — PLAN OF CARE
Goal Outcome Evaluation:  Plan of Care Reviewed With: patient        Progress: no change  Outcome Summary: Pt sitting in recliner chair upon arrival, agreeable to work with OT. Pt completed grooming task with set up on this date. Pt educated on importance of completing oral hygiene task even with bridge. Pt completed 2x10 reps of BUE AROM exercises with vc's and tc's for proper technique in order to increase strength with adls and functional transfers. OT will continue to follow pt.    Pt wore face mask. OT wore all PPE and hand hygiene completed before and after.

## 2021-09-13 NOTE — CASE MANAGEMENT/SOCIAL WORK
Continued Stay Note  [facility]     Patient Name: Jake Kan  MRN: [id number]  Today's Date: 9/13/2021    Admit Date: 9/2/2021    Discharge Plan     Row Name 09/13/21 1517       Plan    Plan  Additional SNF referrals pending    Patient/Family in Agreement with Plan  yes    Plan Comments  Outbound call to patient's brother. He and his wife are agreeable to blanket referrals for SNF. Referrals throughout [place] and [facility] pending. CCP will continue to follow and assist with DC planning. Melissa Tobar RN CCP        Discharge Codes    No documentation.       Expected Discharge Date and Time     Expected Discharge Date Expected Discharge Time    Sep 14, 2021             Melissa Tobar RN

## 2021-09-13 NOTE — THERAPY TREATMENT NOTE
Patient Name: Jake Kan  : 1945    MRN: [id number]                              Today's Date: 2021       Admit Date: 2021    Visit Dx:     ICD-10-CM ICD-9-CM   1. Pneumonia due to COVID-19 virus  U07.1 480.8    J12.82 079.89   2. Acute kidney injury (ANA) with acute tubular necrosis (ATN) (CMS/HCC)  N17.0 584.5   3. Acute UTI  N39.0 599.0   4. Altered mental status, unspecified altered mental status type  R41.82 780.97     Patient Active Problem List   Diagnosis   • Sciatica of right side   • Phlegm in throat   • Heartburn   • Abnormal EKG   • Debility   • Hyperlipidemia   • Other symptoms involving digestive system   • Paresthesias   • Pruritus   • Rosacea   • Seborrheic dermatitis   • Pneumonia due to COVID-19 virus   • Metabolic encephalopathy   • Dehydration   • Hypernatremia   • ANA (acute kidney injury) (CMS/HCC)   • Stage 3a chronic kidney disease (CMS/HCC)   • Positive D-dimer   • Acute cystitis   • Hypoxia     History reviewed. No pertinent past medical history.  Past Surgical History:   Procedure Laterality Date   • HERNIA REPAIR     • UMBILICAL HERNIA REPAIR N/A 2019    Procedure: excision of umbilical hernia mesh and umbilical hernia repair;  Surgeon: Luan Dowell MD;  Location: [facility];  Service: General   • WRIST FRACTURE SURGERY Left      General Information     Row Name 21 1301          OT Time and Intention    Document Type  therapy note (daily note)  -BT     Mode of Treatment  individual therapy;occupational therapy  -BT     Row Name 21 1301          General Information    Patient Profile Reviewed  yes  -BT     Existing Precautions/Restrictions  fall  -BT     Row Name 21 1301          Cognition    Orientation Status (Cognition)  oriented to;person  -BT     Row Name 21 1301          Safety Issues, Functional Mobility    Safety Issues Affecting Function (Mobility)  ability to follow commands;sequencing abilities  -BT     Impairments  Affecting Function (Mobility)  balance;cognition;coordination;endurance/activity tolerance;strength;motor planning;postural/trunk control;visual/perceptual  -BT     Cognitive Impairments, Mobility Safety/Performance  sequencing abilities;problem-solving/reasoning  -BT       User Key  (r) = Recorded By, (t) = Taken By, (c) = Cosigned By    Initials Name Provider Type    BT Madeleine Rey OT Occupational Therapist          Mobility/ADL's     Row Name 09/13/21 1302          Bed Mobility    Comment (Bed Mobility)  up in chair upon arrival  -BT     Row Name 09/13/21 1302          Activities of Daily Living    BADL Assessment/Intervention  grooming  -BT     Row Name 09/13/21 1302          Grooming Assessment/Training    [place] Level (Grooming)  grooming skills;wash face, hands  -BT     Position (Grooming)  supported sitting  -BT     Comment (Grooming)  educated pt on importance of oral hygiene due to pt stating he hasn't completed oral hygiene in a while over his bridge  -BT       User Key  (r) = Recorded By, (t) = Taken By, (c) = Cosigned By    Initials Name Provider Type    BT Madeleine Rey OT Occupational Therapist        Obj/Interventions     Row Name 09/13/21 1309          Therapeutic Exercise    Therapeutic Exercise  shoulder;elbow/forearm 2x10 reps of BUE AROM exercises  -BT       User Key  (r) = Recorded By, (t) = Taken By, (c) = Cosigned By    Initials Name Provider Type    BT Madeleine Rey OT Occupational Therapist        Goals/Plan    No documentation.       Clinical Impression     Row Name 09/13/21 1309          Pain Assessment    Additional Documentation  Pain Scale: Numbers Pre/Post-Treatment (Group)  -BT     Row Name 09/13/21 1309          Pain Scale: Numbers Pre/Post-Treatment    Pretreatment Pain Rating  0/10 - no pain  -BT     Posttreatment Pain Rating  0/10 - no pain  -BT     Row Name 09/13/21 1309          Plan of Care Review    Plan of Care Reviewed With  patient  -BT     Progress  no change   -BT     Outcome Summary  Pt sitting in recliner chair upon arrival, agreeable to work with OT. Pt completed grooming task with set up on this date. Pt educated on importance of completing oral hygiene task even with bridge. Pt completed 2x10 reps of BUE AROM exercises with vc's and tc's for proper technique in order to increase strength with adls and functional transfers. OT will continue to follow pt.  -BT     Row Name 09/13/21 1309          Positioning and Restraints    Pre-Treatment Position  sitting in chair/recliner  -BT     Post Treatment Position  chair  -BT     In Chair  notified nsg;sitting;call light within reach;encouraged to call for assist;exit alarm on  -BT       User Key  (r) = Recorded By, (t) = Taken By, (c) = Cosigned By    Initials Name Provider Type    BT Madeleine Rey OT Occupational Therapist        Outcome Measures     Row Name 09/13/21 1316          How much help from another is currently needed...    Putting on and taking off regular lower body clothing?  2  -BT     Bathing (including washing, rinsing, and drying)  2  -BT     Toileting (which includes using toilet bed pan or urinal)  2  -BT     Putting on and taking off regular upper body clothing  2  -BT     Taking care of personal grooming (such as brushing teeth)  3  -BT     Eating meals  3  -BT     AM-PAC 6 Clicks Score (OT)  14  -BT     Row Name 09/13/21 1157          How much help from another person do you currently need...    Turning from your back to your side while in flat bed without using bedrails?  3  -SR     Moving from lying on back to sitting on the side of a flat bed without bedrails?  3  -SR     Moving to and from a bed to a chair (including a wheelchair)?  3  -SR     Standing up from a chair using your arms (e.g., wheelchair, bedside chair)?  3  -SR     Climbing 3-5 steps with a railing?  2  -SR     To walk in hospital room?  3  -SR     AM-PAC 6 Clicks Score (PT)  17  -SR     Row Name 09/13/21 1157          Modified  [place] Scale    Modified Wan Scale  4 - Moderately severe disability.  Unable to walk without assistance, and unable to attend to own bodily needs without assistance.  -SR     Row Name 09/13/21 1316 09/13/21 1157       Functional Assessment    Outcome Measure Options  AM-PAC 6 Clicks Daily Activity (OT)  -BT  AM-PAC 6 Clicks Basic Mobility (PT)  -SR      User Key  (r) = Recorded By, (t) = Taken By, (c) = Cosigned By    Initials Name Provider Type    BT Madeleine Rey OT Occupational Therapist    SR Morena Arrington Physical Therapist          Occupational Therapy Education                 Title: PT OT SLP Therapies (In Progress)     Topic: Occupational Therapy (In Progress)     Point: ADL training (Done)     Description:   Instruct learner(s) on proper safety adaptation and remediation techniques during self care or transfers.   Instruct in proper use of assistive devices.              Learning Progress Summary           Patient Acceptance, E, VU by  at 9/3/2021 1302    Comment: The role of OT                   Point: Home exercise program (Not Started)     Description:   Instruct learner(s) on appropriate technique for monitoring, assisting and/or progressing therapeutic exercises/activities.              Learner Progress:  Not documented in this visit.          Point: Precautions (Not Started)     Description:   Instruct learner(s) on prescribed precautions during self-care and functional transfers.              Learner Progress:  Not documented in this visit.          Point: Body mechanics (Not Started)     Description:   Instruct learner(s) on proper positioning and spine alignment during self-care, functional mobility activities and/or exercises.              Learner Progress:  Not documented in this visit.                      User Key     Initials Effective Dates Name Provider Type Discipline     01/05/21 -  Cara Baker OT Occupational Therapist OT              OT Recommendation and Plan     Plan of  Care Review  Plan of Care Reviewed With: patient  Progress: no change  Outcome Summary: Pt sitting in recliner chair upon arrival, agreeable to work with OT. Pt completed grooming task with set up on this date. Pt educated on importance of completing oral hygiene task even with bridge. Pt completed 2x10 reps of BUE AROM exercises with vc's and tc's for proper technique in order to increase strength with adls and functional transfers. OT will continue to follow pt.     Time Calculation:   Time Calculation- OT     Row Name 09/13/21 1317             Time Calculation- OT    OT Start Time  1044  -BT      OT Stop Time  1107  -BT      OT Time Calculation (min)  23 min  -BT      Total Timed Code Minutes- OT  23 minute(s)  -BT      OT Received On  09/13/21  -BT      OT - Next Appointment  09/15/21  -BT         Timed Charges    37216 - OT Therapeutic Exercise Minutes  15  -BT      56734 - OT Self Care/Mgmt Minutes  8  -BT         Total Minutes    Timed Charges Total Minutes  23  -BT       Total Minutes  23  -BT        User Key  (r) = Recorded By, (t) = Taken By, (c) = Cosigned By    Initials Name Provider Type    BT Madeleine Rey OT Occupational Therapist        Therapy Charges for Today     Code Description Service Date Service Provider Modifiers Qty    88977589001 HC OT THER PROC EA 15 MIN 9/13/2021 Madeleine Rey OT GO 1    94755489111 HC OT SELF CARE/MGMT/TRAIN EA 15 MIN 9/13/2021 Madeleine Rey OT GO 1               Madeleine Rey OT  9/13/2021

## 2021-09-13 NOTE — PLAN OF CARE
Goal Outcome Evaluation:   Patient is resting abed without previous bilateral wrist restraints. He is alert, cooperative, and taking his medications crushed in applesauce without complaint. Lungs are clear to diminished bilaterally, with an intermittent, dry cough noted that is non-productive. Respirations are shallow, and he is able to maintain VSS and on room air. Abdomen is soft with + BS in all quadrants, he is incontinent of both bowel and bladder and does use his urinal at times with minimal assistance. He is voiding straw colored urine via urinal, and nursing is encouraging him to drink clear liquids. Skin is warm, pink, and intact, with nursing turning and repositioning him every couple of hours. Patient is asking to get up and walk with physical therapy at this time.

## 2021-09-13 NOTE — PLAN OF CARE
Goal Outcome Evaluation:              Outcome Summary: up in chair most of the day; forgetful and confused but pleasant; reoriented frequently; uses urinal with assist; on RA; no complaints; eating fairly well; likes magic cup

## 2021-09-13 NOTE — CASE MANAGEMENT/SOCIAL WORK
Continued Stay Note  [facility]     Patient Name: Jake Kan  MRN: [id number]  Today's Date: 9/13/2021    Admit Date: 9/2/2021    Discharge Plan     Row Name 09/13/21 0948       Plan    Plan Comments  Left another  for [facility] [place] to check the status of the referral. Awaiting return call. Melissa Tobar RN CCP        Discharge Codes    No documentation.       Expected Discharge Date and Time     Expected Discharge Date Expected Discharge Time    Sep 14, 2021             Melissa Tobar RN

## 2021-09-13 NOTE — DISCHARGE PLACEMENT REQUEST
"Young Kan (76 y.o. Male)     Date of Birth Social Security Number Address Home Phone MRN    1945  [address] [facility]  [place] [address] [phone] [id number]    Mandaeism Marital Status          None Single       Admission Date Admission Type Admitting Provider Attending Provider Department, Room/Bed    9/2/21 Emergency Benjamin Loera MD Edling, Stephen A, MD [address], [address]    Discharge Date Discharge Disposition Discharge Destination                       Attending Provider: Benjamin Kiran MD    Allergies: No Known Allergies    Isolation: Enh Drop/Con   Infection: COVID (confirmed) (08/22/21)   Code Status: CPR    Ht: 180.3 cm (71\")   Wt: 65.8 kg (145 lb 1.6 oz)    Admission Cmt: None   Principal Problem: Pneumonia due to COVID-19 virus [U07.1,J12.82]                 Active Insurance as of 9/2/2021     Primary Coverage     Payor Plan Insurance Group Employer/Plan Group    MEDICARE MEDICARE A & B      Payor Plan Address Payor Plan Phone Number Payor Plan Fax Number Effective Dates    [address] [phone]  3/1/2008 - None Entered    [place] [address]       Subscriber Name Subscriber Birth Date Member ID       YOUNG KAN 1945 [id number]                 Emergency Contacts      (Rel.) Home Phone Work Phone Mobile Phone    Rayray Kan (Brother) [phone] -- --    Kary Charles (Friend) [phone] -- [phone]            {Outbreak/Travel/Exposure Documentation......;  Question Available Choices Patient Response   COVID-19 Outbreak Screen:  Do you currently have a new onset of the following symptoms?        Fever/Chills, Cough, Shortness of air, Loss of taste or smell, No, Unknown  Unknown (09/03/21 0233)   COVID-19 Outbreak Screen: In the last 14 days, have you had contact with anyone who is ill, has show any of the symptoms listed above and/or has been diagnosis with the 2019 Novel Coronavirus? This includes any " immediate household members but excludes any patients with whom you have been in contact within your normal work duties wearing proper PPE, if you are a healthcare worker.  Yes, No, Unknown              Unknown (09/03/21 0233)   COVID-19 Outbreak Screen: Who was notified? Free text (not recorded)   Ebola Screening Outbreak Screen: Have you traveled to the [place] the [place] or [place] within the past 21 days?  Yes, No, Unknown (not recorded)   Ebola Screening Outbreak Screen: Do you have ANY of the following symptoms: Fever/Chills, Vomiting, Diarrhea, Fatigue, Headache, Muscle pain, Unexplained bleeding, Abdominal (stomach) pain, No, Unknown (not recorded)   Ebola Screening Outbreak Screen: Name of Person notified Free text (not recorded)   Travel Screen: Have you traveled in the last month? If so, to what country have you traveled? If [place] what state? Yes, No, Unknown  List of all countries  List of all States No (09/03/21 0233)  (not recorded)  (not recorded)   Infection Risk: Do you currently have the following symptoms?  (If cough is selected, the Tuberculosis Screen is performed.) Cough, Fever, Rash, No No (09/03/21 0233)   Tuberculosis Screen: Do you have any of the following Tuberculosis Risks?  · Have you lived or spent time with anyone who had or may have TB?  · Have you lived in or visited any of the following areas for more than one month: [place], [place], [place], [place] or [place], the [place] or [place]?  · Do you have HIV/AIDS?  · Have you lived in or worked in a nursing home, homeless shelter, correctional facility, or substance abuse treatment facility?   · No    If Yes do you have any of the following symptoms? Yes responses display to the right    If Yes, symptoms listed are:  Cough greater than or equal to 3 weeks, Loss of appetite, Unexplained weight loss, Night sweats, Bloody sputum or hemoptysis, Hoarseness, Fever, Fatigue, Chest pain, No (not recorded)  (not recorded)    Exposure Screen: Have you been exposed to any of these contagious diseases in the last month? Measles, Chickenpox, Meningitis, Pertussis, Whooping Cough, No No (09/03/21 0369)

## 2021-09-14 PROCEDURE — 25010000002 ENOXAPARIN PER 10 MG: Performed by: INTERNAL MEDICINE

## 2021-09-14 RX ADMIN — PANTOPRAZOLE SODIUM 40 MG: 40 TABLET, DELAYED RELEASE ORAL at 06:51

## 2021-09-14 RX ADMIN — ENOXAPARIN SODIUM 40 MG: 40 INJECTION SUBCUTANEOUS at 08:27

## 2021-09-14 RX ADMIN — SODIUM CHLORIDE, PRESERVATIVE FREE 3 ML: 5 INJECTION INTRAVENOUS at 20:26

## 2021-09-14 RX ADMIN — OLANZAPINE 7.5 MG: 7.5 TABLET ORAL at 17:03

## 2021-09-14 NOTE — PLAN OF CARE
Goal Outcome Evaluation:  Plan of Care Reviewed With: patient        Progress: no change  Outcome Summary: Pleasantly confused.  Bed alarm in use.  Sats WNL on RA.  Possible DC to rehab soon.

## 2021-09-14 NOTE — CASE MANAGEMENT/SOCIAL WORK
Continued Stay Note  [facility]     Patient Name: Jake Kan  MRN: [id number]  Today's Date: 9/14/2021    Admit Date: 9/2/2021    Discharge Plan     Row Name 09/14/21 0934       Plan    Plan Comments  Outbound call to Stacy/Jeri. Discussed referral and they don't have beds at [facility] but [facility] does have beds. She is going to discuss referral with the clinical team. Patient would be in isolation for 10 days after admit and not allowed to have visitors. Stacy will follow up with CCP once she has more information. Melissa Tobar RN CCP        Discharge Codes    No documentation.       Expected Discharge Date and Time     Expected Discharge Date Expected Discharge Time    Sep 14, 2021             Melissa Tobar RN

## 2021-09-14 NOTE — CASE MANAGEMENT/SOCIAL WORK
Continued Stay Note  [facility]     Patient Name: Jake Kan  MRN: [id number]  Today's Date: 9/14/2021    Admit Date: 9/2/2021    Discharge Plan     Row Name 09/14/21 1116       Plan    Plan Comments  Spoke with Tone. She is reviewing the last 3 days of clinical information but stated they should be able to accept the patient and could possibly accept him today. Awaiting confirmation from Stacy once she completes the 3 day review. Melissa Tobar RN [facility]    Row Name 09/14/21 0934       Plan    Plan Comments  Outbound call to Stacy/Jeri. Discussed referral and they dont have beds at [facility] but [facility] does have beds. She is going to discuss referral with the clinical team. Patient would be in isolation for 10 days after admit and not allowed to have visitors. Stacy will follow up with CCP once she has more information. Melissa Tobar RN CCP        Discharge Codes    No documentation.       Expected Discharge Date and Time     Expected Discharge Date Expected Discharge Time    Sep 14, 2021             Melissa Tobar RN

## 2021-09-14 NOTE — PROGRESS NOTES
[facility]     LOS: 12 days     Subjective:    CC:Altered Mental Status    DIET:  Diet Order   Procedures   • Diet Regular; Nectar / Syrup Thick     -Not reliable historian  -eating some  -Currently out of restrants    Objective:    Vital Signs:  Temp:  [97 °F (36.1 °C)-98.3 °F (36.8 °C)] 98.3 °F (36.8 °C)  Heart Rate:  [65-80] 68  Resp:  [20] 20  BP: (108-139)/(67-79) 139/76    SpO2:  [94 %-98 %] 97 %  on   ;   Device (Oxygen Therapy): room air  Body mass index is 20.24 kg/m².    Physical Exam  Constitutional:       Appearance: Normal appearance.   HENT:      Head: Normocephalic and atraumatic.   Cardiovascular:      Rate and Rhythm: Normal rate and regular rhythm.      Heart sounds: No murmur heard.   No friction rub.   Pulmonary:      Effort: Pulmonary effort is normal.      Breath sounds: Normal breath sounds.   Abdominal:      General: Bowel sounds are normal. There is no distension.      Palpations: Abdomen is soft.      Tenderness: There is no abdominal tenderness.   Skin:     General: Skin is warm and dry.   Neurological:      Mental Status: He is alert.      Comments: Good long term memory, poor short term memory   Psychiatric:         Mood and Affect: Mood normal.         Behavior: Behavior normal.         Results Review:    Glucose   Date Value Ref Range Status   09/12/2021 100 (H) 65 - 99 mg/dL Final     Results from last 7 days   Lab Units 09/12/21  0610   WBC 10*3/mm3 7.08   HEMOGLOBIN g/dL 12.9*   HEMATOCRIT % 38.0   PLATELETS 10*3/mm3 344     Results from last 7 days   Lab Units 09/12/21  0610 09/10/21  0641 09/10/21  0641   SODIUM mmol/L 142   < > 143   POTASSIUM mmol/L 3.5   < > 3.7   CHLORIDE mmol/L 109*   < > 108*   CO2 mmol/L 23.3   < > 23.2   BUN mg/dL 33*   < > 30*   CREATININE mg/dL 1.16   < > 0.99   CALCIUM mg/dL 8.5*   < > 8.9   BILIRUBIN mg/dL  --   --  1.1   ALK PHOS U/L  --   --  68   ALT (SGPT) U/L  --   --  75*   AST (SGOT) U/L  --   --  38   GLUCOSE mg/dL  100*   < > 82    < > = values in this interval not displayed.                 Cultures:  Blood Culture   Date Value Ref Range Status   09/02/2021 No growth at 5 days  Final   09/02/2021 No growth at 5 days  Final       I have reviewed daily medications and changes in CPOE    Scheduled meds  enoxaparin, 40 mg, Subcutaneous, Q24H  OLANZapine, 7.5 mg, Oral, Nightly  pantoprazole, 40 mg, Oral, QAM  sodium chloride, 3 mL, Intravenous, Q12H           PRN meds  •  acetaminophen **OR** acetaminophen **OR** acetaminophen  •  docusate sodium  •  famotidine  •  melatonin  •  nitroglycerin  •  ondansetron **OR** ondansetron  •  [COMPLETED] Insert peripheral IV **AND** sodium chloride  •  sodium chloride        Pneumonia due to COVID-19 virus    Debility    Metabolic encephalopathy    Dehydration    Hypernatremia    ANA (acute kidney injury) (CMS/HCC)    Stage 3a chronic kidney disease (CMS/[facility])    Positive D-dimer    Acute cystitis    Hypoxia        Assessment/Plan:  COVID-19 Pneumonia with Hypoxia  - diagnosed 8/21/21, patient is unvaccinated  - hypoxic on admission-this is resolved  -Finish 10 days of Decadron  - has elevated d-dimer, BLE venous dopplers are negative  -cannot have CTA chest due to renal function, perfusion scan is normal, continue with low-dose Lovenox  - encourage pulmonary toilet  -Patient remains on room air     ANA on Stage 3a CKD  - likely pre-renal due to dehydration, also has hypernatremia  -Kidney function is improved     UTI  - outpatient urine culture grew enterococcus, PCN sensitive  - finished course of ampicillin     Metabolic Encephalopathy/Delirium  - probably due to above issues, no focal deficits, patient was seen in consultation by neurology  -Mental status has improved off of Decadron  -Continue Zyprexa  -Patient getting up to chair    Lovenox 40 mg SC daily for DVT prophylaxis.  Full code.    D/w nurse    Benjamin Kiran MD  09/14/21  14:09 EDT

## 2021-09-14 NOTE — CASE MANAGEMENT/SOCIAL WORK
Continued Stay Note  [facility]     Patient Name: Jake Kan  MRN: [id number]  Today's Date: 9/14/2021    Admit Date: 9/2/2021    Discharge Plan     Row Name 09/14/21 1516       Plan    Plan Comments  Community message from [facility]. Outbound call to Brittany ([phone]) Discussed patient and she stated they can accept and they will have a bed available tomorrow. She also stated she spoke with the patient's brother and he was agreeable. Outbound call to patient's brother Rayray. Discussed DC plan and he is agreeable to [facility]. Explained patient can admit tomorrow and that [facility] would update him with DC time tomorrow. Updated Stacy. Melissa Tobar RN CCP        Discharge Codes    No documentation.       Expected Discharge Date and Time     Expected Discharge Date Expected Discharge Time    Sep 14, 2021             Melissa Tobar RN

## 2021-09-14 NOTE — PLAN OF CARE
Goal Outcome Evaluation:              Outcome Summary: confused; more agitated today; tries to get out of bed at times; frequently reoriented; no soa; on RA; bed alarm for safety

## 2021-09-15 ENCOUNTER — READMISSION MANAGEMENT (OUTPATIENT)
Dept: CALL CENTER | Facility: HOSPITAL | Age: 76
End: 2021-09-15

## 2021-09-15 VITALS
DIASTOLIC BLOOD PRESSURE: 72 MMHG | HEART RATE: 85 BPM | WEIGHT: 145.1 LBS | OXYGEN SATURATION: 100 % | BODY MASS INDEX: 20.31 KG/M2 | SYSTOLIC BLOOD PRESSURE: 111 MMHG | RESPIRATION RATE: 20 BRPM | TEMPERATURE: 98.7 F | HEIGHT: 71 IN

## 2021-09-15 PROBLEM — D89.832 CYTOKINE RELEASE SYNDROME, GRADE 2: Status: ACTIVE | Noted: 2021-09-15

## 2021-09-15 LAB
ALBUMIN SERPL-MCNC: 3.3 G/DL (ref 3.5–5.2)
ALBUMIN/GLOB SERPL: 1.1 G/DL
ALP SERPL-CCNC: 76 U/L (ref 39–117)
ALT SERPL W P-5'-P-CCNC: 92 U/L (ref 1–41)
ANION GAP SERPL CALCULATED.3IONS-SCNC: 10.8 MMOL/L (ref 5–15)
AST SERPL-CCNC: 32 U/L (ref 1–40)
BASOPHILS # BLD AUTO: 0.03 10*3/MM3 (ref 0–0.2)
BASOPHILS NFR BLD AUTO: 0.5 % (ref 0–1.5)
BILIRUB SERPL-MCNC: 0.7 MG/DL (ref 0–1.2)
BUN SERPL-MCNC: 22 MG/DL (ref 8–23)
BUN/CREAT SERPL: 19.3 (ref 7–25)
CALCIUM SPEC-SCNC: 9.1 MG/DL (ref 8.6–10.5)
CHLORIDE SERPL-SCNC: 105 MMOL/L (ref 98–107)
CO2 SERPL-SCNC: 24.2 MMOL/L (ref 22–29)
CREAT SERPL-MCNC: 1.14 MG/DL (ref 0.76–1.27)
D DIMER PPP FEU-MCNC: 1.24 MCGFEU/ML (ref 0–0.49)
DEPRECATED RDW RBC AUTO: 42 FL (ref 37–54)
EOSINOPHIL # BLD AUTO: 0.1 10*3/MM3 (ref 0–0.4)
EOSINOPHIL NFR BLD AUTO: 1.7 % (ref 0.3–6.2)
ERYTHROCYTE [DISTWIDTH] IN BLOOD BY AUTOMATED COUNT: 12.8 % (ref 12.3–15.4)
GFR SERPL CREATININE-BSD FRML MDRD: 62 ML/MIN/1.73
GLOBULIN UR ELPH-MCNC: 2.9 GM/DL
GLUCOSE SERPL-MCNC: 119 MG/DL (ref 65–99)
HCT VFR BLD AUTO: 37 % (ref 37.5–51)
HGB BLD-MCNC: 12.9 G/DL (ref 13–17.7)
IMM GRANULOCYTES # BLD AUTO: 0.07 10*3/MM3 (ref 0–0.05)
IMM GRANULOCYTES NFR BLD AUTO: 1.2 % (ref 0–0.5)
LYMPHOCYTES # BLD AUTO: 1.03 10*3/MM3 (ref 0.7–3.1)
LYMPHOCYTES NFR BLD AUTO: 17 % (ref 19.6–45.3)
MCH RBC QN AUTO: 31.7 PG (ref 26.6–33)
MCHC RBC AUTO-ENTMCNC: 34.9 G/DL (ref 31.5–35.7)
MCV RBC AUTO: 90.9 FL (ref 79–97)
MONOCYTES # BLD AUTO: 0.46 10*3/MM3 (ref 0.1–0.9)
MONOCYTES NFR BLD AUTO: 7.6 % (ref 5–12)
NEUTROPHILS NFR BLD AUTO: 4.37 10*3/MM3 (ref 1.7–7)
NEUTROPHILS NFR BLD AUTO: 72 % (ref 42.7–76)
NRBC BLD AUTO-RTO: 0 /100 WBC (ref 0–0.2)
PLATELET # BLD AUTO: 305 10*3/MM3 (ref 140–450)
PMV BLD AUTO: 10.8 FL (ref 6–12)
POTASSIUM SERPL-SCNC: 4.1 MMOL/L (ref 3.5–5.2)
PROT SERPL-MCNC: 6.2 G/DL (ref 6–8.5)
RBC # BLD AUTO: 4.07 10*6/MM3 (ref 4.14–5.8)
SODIUM SERPL-SCNC: 140 MMOL/L (ref 136–145)
WBC # BLD AUTO: 6.06 10*3/MM3 (ref 3.4–10.8)

## 2021-09-15 PROCEDURE — 25010000002 ENOXAPARIN PER 10 MG: Performed by: INTERNAL MEDICINE

## 2021-09-15 PROCEDURE — 85025 COMPLETE CBC W/AUTO DIFF WBC: CPT | Performed by: HOSPITALIST

## 2021-09-15 PROCEDURE — 85379 FIBRIN DEGRADATION QUANT: CPT | Performed by: INTERNAL MEDICINE

## 2021-09-15 PROCEDURE — 80053 COMPREHEN METABOLIC PANEL: CPT | Performed by: HOSPITALIST

## 2021-09-15 PROCEDURE — 36415 COLL VENOUS BLD VENIPUNCTURE: CPT | Performed by: INTERNAL MEDICINE

## 2021-09-15 RX ORDER — OLANZAPINE 7.5 MG/1
7.5 TABLET ORAL NIGHTLY
Start: 2021-09-15 | End: 2022-09-27

## 2021-09-15 RX ADMIN — PANTOPRAZOLE SODIUM 40 MG: 40 TABLET, DELAYED RELEASE ORAL at 06:40

## 2021-09-15 RX ADMIN — ENOXAPARIN SODIUM 40 MG: 40 INJECTION SUBCUTANEOUS at 08:41

## 2021-09-15 RX ADMIN — SODIUM CHLORIDE, PRESERVATIVE FREE 3 ML: 5 INJECTION INTRAVENOUS at 08:41

## 2021-09-15 NOTE — CASE MANAGEMENT/SOCIAL WORK
Continued Stay Note  [facility]     Patient Name: Jake Kan  MRN: [id number]  Today's Date: 9/15/2021    Admit Date: 9/2/2021    Discharge Plan     Row Name 09/15/21 1156       Plan    Plan  [facility]    Patient/Family in Agreement with Plan  yes    Plan Comments  Per MD planning for DC today. EMS scheduled for 5 pm. Spoke with patient's brother Rayray. He verbalized understanding. They are going to reach out to [facility] to discuss visitor policy. Updated nurse of DC time. Partial packet at nurses station. Melissa Tobar RN CCP        Discharge Codes    No documentation.       Expected Discharge Date and Time     Expected Discharge Date Expected Discharge Time    Sep 14, 2021             Melissa Tobar RN

## 2021-09-15 NOTE — DISCHARGE SUMMARY
[facility]    ASSOCIATES  [phone]    DISCHARGE SUMMARY  [facility]    Patient Identification:  Name: Jake Kan  Age: 76 y.o.  Sex: male  :  1945  MRN: [id number]  Primary Care Physician: Sree Thomas MD    Admit date: 2021  Discharge date and time:      Discharge Diagnoses:  Pneumonia due to COVID-19 virus    Debility    Metabolic encephalopathy    Dehydration    Hypernatremia    ANA (acute kidney injury) (CMS/[facility])    Stage 3a chronic kidney disease (CMS/[facility])    Positive D-dimer    Acute cystitis    Hypoxia       History of present illness from H&P:    Jake Kan is a 76 y.o. male presents to the emergency department by EMS.  History was taken per emergency room physician as patient is a very poor historian and unable to provide much additional history.  Per ER reports patient's neighbor called the police and patient was found naked in bed appearing dehydrated.  Patient was positive for COVID-19 on .  Patient has productive cough and shortness of breath.  He was seen on 2 L nasal cannula.  He was given dexamethasone in the emergency room and is being admitted for COVID-19 pneumonia with hypoxia and other acute issues    Hospital Course:     The patient was admitted to the hospital with COVID-19 pneumonia and hypoxemia.  Patient received 10 days of Decadron on been on room air for several days.  Patient did have an elevated dimer but lower extremity Dopplers were negative and had a perfusion study which was also normal.  Patient required restraints during his hospital stay because of confusion.  He was seen in consultation by neurology.  CT scan of the brain showed mild cerebral atrophy.  Patient's confusion has cleared he is now out of restraints for about 2 days.  He is sitting up eating and anxious for discharge.      COVID-19 Pneumonia with Hypoxia  - diagnosed 21, patient is unvaccinated  - hypoxic on admission-this is  resolved  -Finish 10 days of Decadron  -Patient received Lovenox for DVT prophylaxis during his hospitalization- encourage pulmonary toilet  -Patient remains on room air     ANA on Stage 3a CKD  -Patient's creatinine was elevated on admission 1.77 but is been normal now for the last several days.     UTI  - outpatient urine culture grew enterococcus, PCN sensitive  - finished course of ampicillin while in the hospital no new symptoms related to this.   -We will asked nurse to check bladder scan prior to discharge    Metabolic Encephalopathy/Delirium  - probably due to above issues, no focal deficits, patient was seen in consultation by neurology  -Mental status has improved off of Decadron  -Continue Zyprexa on discharge and would consider weaning at facility    Dysphagia for which patient is on a modified diet and would recommend follow-up speech therapy outpatient.         The patient was seen and examined on the day of discharge.    Consults:   Consults     Date and Time Order Name Status Description    9/10/2021 10:51 AM Inpatient Neurology Consult General Completed     9/2/2021  9:49 PM [facility] (on-call MD unless specified) Details Completed           Results from last 7 days   Lab Units 09/15/21  0919   WBC 10*3/mm3 6.06   HEMOGLOBIN g/dL 12.9*   HEMATOCRIT % 37.0*   PLATELETS 10*3/mm3 305       Results from last 7 days   Lab Units 09/15/21  0920   SODIUM mmol/L 140   POTASSIUM mmol/L 4.1   CHLORIDE mmol/L 105   CO2 mmol/L 24.2   BUN mg/dL 22   CREATININE mg/dL 1.14   GLUCOSE mg/dL 119*   CALCIUM mg/dL 9.1       Significant Diagnostic Studies:   WBC   Date Value Ref Range Status   09/15/2021 6.06 3.40 - 10.80 10*3/mm3 Final     Hemoglobin   Date Value Ref Range Status   09/15/2021 12.9 (L) 13.0 - 17.7 g/dL Final     Hematocrit   Date Value Ref Range Status   09/15/2021 37.0 (L) 37.5 - 51.0 % Final     Platelets   Date Value Ref Range Status   09/15/2021 305 140 - 450 10*3/mm3 Final     Sodium   Date Value Ref  Range Status   09/15/2021 140 136 - 145 mmol/L Final     Potassium   Date Value Ref Range Status   09/15/2021 4.1 3.5 - 5.2 mmol/L Final     Chloride   Date Value Ref Range Status   09/15/2021 105 98 - 107 mmol/L Final     CO2   Date Value Ref Range Status   09/15/2021 24.2 22.0 - 29.0 mmol/L Final     BUN   Date Value Ref Range Status   09/15/2021 22 8 - 23 mg/dL Final     Creatinine   Date Value Ref Range Status   09/15/2021 1.14 0.76 - 1.27 mg/dL Final     Glucose   Date Value Ref Range Status   09/15/2021 119 (H) 65 - 99 mg/dL Final     Calcium   Date Value Ref Range Status   09/15/2021 9.1 8.6 - 10.5 mg/dL Final     AST (SGOT)   Date Value Ref Range Status   09/15/2021 32 1 - 40 U/L Final     ALT (SGPT)   Date Value Ref Range Status   09/15/2021 92 (H) 1 - 41 U/L Final     Alkaline Phosphatase   Date Value Ref Range Status   09/15/2021 76 39 - 117 U/L Final     No results found for: APTT, INR  No results found for: COLORU, CLARITYU, SPECGRAV, PHUR, PROTEINUR, GLUCOSEU, KETONESU, BLOODU, NITRITE, LEUKOCYTESUR, BILIRUBINUR, UROBILINOGEN, RBCUA, WBCUA, BACTERIA, UACOMMENT  No results found for: TROPONINT, TROPONINI, BNP  No components found for: HGBA1C;2  No components found for: TSH;2    Imaging Results (All)     Procedure Component Value Units Date/Time    CT Head Without Contrast [[id number]] Collected: 09/09/21 1024     Updated: 09/09/21 1029    Narrative:      NONCONTRAST HEAD CT 09/09/2021     CLINICAL HISTORY: The patient is COVID positive, has confusion and  mental status changes.     TECHNIQUE: Spiral CT images were obtained from the base of the skull to  the vertex without intravenous contrast. The images were reformatted and  submitted in 3 mm thick axial CT sections with brain algorithm and 2 mm  thick sagittal and coronal reconstructions were performed and submitted  in brain algorithm.     COMPARISON: There are no prior head CTs or MRIs of the head from [facility] for comparison.      FINDINGS: There is some mild patchy low-density in the periventricular  white matter consistent with mild small vessel disease. The remainder of  the brain parenchyma is normal in attenuation. There is mild cerebral  atrophy. The ventricles are normal in size. I see no focal mass effect.  There is no midline shift. No extra-axial fluid collections are  identified. There is no evidence of acute intracranial hemorrhage. The  calvarium and skull base are normal in appearance. The paranasal sinuses  and mastoid air cells and middle ear cavities are clear.       Impression:      1. There is some mild patchy low-density in the periventricular white  matter consistent with mild small vessel disease and there is mild  cerebral atrophy. The remainder of the head CT is within normal limits  with no acute intracranial abnormality seen to account for the acute  onset of confusion and mental status changes.     Radiation dose reduction techniques were utilized, including automated  exposure control and exposure modulation based on body size.     This report was finalized on 9/9/2021 10:26 AM by Dr. Bright Erwin M.D.       NM Lung Scan Perfusion Particulate [[id number]] Collected: 09/04/21 1006     Updated: 09/04/21 1011    Narrative:      NM LUNG SCAN PERFUSION PARTICULATE-     CLINICAL HISTORY: Acute onset chest pain. Positive d-dimer.     TECHNIQUE: The patient was injected with 5.5 mCi of technetium MAA.  Anterior, posterior, lateral and oblique views of the lungs were  acquired.      COMPARISON: None     FINDINGS: There is homogeneous distribution of the radiopharmaceutical  within both lungs. No defects suspicious for pulmonary embolism are  identified.       Impression:      Normal perfusion lung scan.     This report was finalized on 9/4/2021 10:08 AM by Dr. Primitivo Viera M.D.       XR Chest 1 View [[id number]] Collected: 09/02/21 2156     Updated: 09/02/21 2156    Narrative:        Patient: YOUNG SNYDER  Time Out:  21:56  Exam(s): FILM CXR 1 VIEW     EXAM:    XR Chest, 1 View    CLINICAL HISTORY:     Reason for exam: AMS, known covid.    TECHNIQUE:    Frontal view of the chest.    COMPARISON:    8 21 2021.    FINDINGS:    Lungs:  Minimal areas of patchy airspace disease bilaterally per    Pleural space:  Unremarkable.  No pneumothorax.    Heart:  Cardiomediastinal silhouette unremarkable.    Mediastinum:  See above.    Bones joints:  Osteopenia.  Healed fracture of the mid left clavicle.    Other findings:  Mild hypoaeration.    IMPRESSION:       1.  Bilateral patchy airspace disease raising concern for possible early   Covid-19 pneumonia.  2.  Osteopenia.      Impression:          Electronically signed by Jd Grande MD on 09-02-21 at 2156      No results found for: SITE, ALLENTEST, PHART, XTJ8CUE, PO2ART, EHX2QIG, BASEEXCESS, I5WAFLDO, HGBBG, HCTABG, OXYHEMOGLOBI, METHHGBN, CARBOXYHGB, CO2CT, BAROMETRIC, MODALITY, FIO2       Discharge Medications      ASK your doctor about these medications      Instructions Start Date   ciprofloxacin 250 MG tablet  Commonly known as: CIPRO   250 mg, Oral, 2 Times Daily      lansoprazole 15 MG capsule  Commonly known as: PREVACID   15 mg, Oral, Daily               Patient Instructions:       Future Appointments   Date Time Provider Department Center   9/15/2021  5:00 PM EMS MED 2  [facility]        Contact information for after-discharge care     Destination     [facility] .    Service: Skilled Nursing  Contact information:  [address] [address] [phone]                       Discharge Order (From admission, onward)    None          Diet Order   Procedures   • Diet Regular; Nectar / Syrup Thick     CBC and a CMP at the nursing care facility within a week        Total time spent discharging patient including evaluation, post hospitalization follow up,  medication and post hospitalization instructions and education, total  time exceeds 30 minutes.    Signed:  Benjamin Kiran MD  9/15/2021  14:13 EDT

## 2021-09-15 NOTE — OUTREACH NOTE
Prep Survey      Responses   [facility] facility patient discharged from?  [place]   Is LACE score < 7 ?  No   Emergency Room discharge w/ pulse ox?  No   Eligibility  Not Eligible   Does the patient have one of the following disease processes/diagnoses(primary or secondary)?  COVID-19   Prep survey completed?  Yes          Asuncion Stroud RN

## 2021-09-15 NOTE — PLAN OF CARE
Goal Outcome Evaluation:  Plan of Care Reviewed With: patient      Patient discharged to REhab. REport called d/c summary and AVS faxed,

## 2021-09-15 NOTE — PLAN OF CARE
Problem: Adult Inpatient Plan of Care  Goal: Plan of Care Review  Flowsheets (Taken 9/15/2021 0401)  Progress: no change  Plan of Care Reviewed With: patient  Outcome Summary: Disoriented to place and situation. VSS. NSR with bundle branch block. Remains on room air. Bed alarm on for safety. Patient slept most of the night.   Goal Outcome Evaluation:  Plan of Care Reviewed With: patient        Progress: no change  Outcome Summary: Disoriented to place and situation. VSS. NSR with bundle branch block. Remains on room air. Bed alarm on for safety. Patient slept most of the night.

## 2021-09-16 NOTE — CASE MANAGEMENT/SOCIAL WORK
Case Management Discharge Note      Final Note: Patient Dc'd to [facility]    Provided Post Acute Provider List?: Yes  Post Acute Provider List: Nursing Home  Provided Post Acute Provider Quality & Resource List?: Refused  Delivered To: Support Person  Support Person: Brother Rayray  Method of Delivery: Telephone    Selected Continued Care - Discharged on 9/15/2021 Admission date: 9/2/2021 - Discharge disposition: Home or Self Care    Destination Coordination complete.    Service Provider Selected Services Address Phone Fax Patient Preferred    [facility]  Skilled Nursing [address] [place] [address] [phone] --          Durable Medical Equipment    No services have been selected for the patient.              Dialysis/Infusion    No services have been selected for the patient.              Home Medical Care    No services have been selected for the patient.              Therapy    No services have been selected for the patient.              Community Resources    No services have been selected for the patient.              Community & DME    No services have been selected for the patient.                  Transportation Services  Ambulance: [facility]    Final Discharge Disposition Code: 03 - skilled nursing facility (SNF)

## 2022-08-23 ENCOUNTER — TRANSCRIBE ORDERS (OUTPATIENT)
Dept: ADMINISTRATIVE | Facility: HOSPITAL | Age: 77
End: 2022-08-23

## 2022-08-23 DIAGNOSIS — F03.90 DEMENTIA WITHOUT BEHAVIORAL DISTURBANCE, UNSPECIFIED DEMENTIA TYPE: Primary | ICD-10-CM

## 2022-08-24 ENCOUNTER — HOSPITAL ENCOUNTER (OUTPATIENT)
Dept: CT IMAGING | Facility: HOSPITAL | Age: 77
Discharge: HOME | End: 2022-08-24
Admitting: NURSE PRACTITIONER

## 2022-08-24 DIAGNOSIS — F03.90 DEMENTIA WITHOUT BEHAVIORAL DISTURBANCE, UNSPECIFIED DEMENTIA TYPE: ICD-10-CM

## 2022-08-24 PROCEDURE — 70450 CT HEAD/BRAIN W/O DYE: CPT

## 2022-09-27 ENCOUNTER — OFFICE VISIT (OUTPATIENT)
Dept: FAMILY MEDICINE CLINIC | Facility: CLINIC | Age: 77
End: 2022-09-27

## 2022-09-27 VITALS
HEART RATE: 87 BPM | SYSTOLIC BLOOD PRESSURE: 120 MMHG | OXYGEN SATURATION: 97 % | TEMPERATURE: 97.7 F | HEIGHT: 71 IN | DIASTOLIC BLOOD PRESSURE: 80 MMHG | WEIGHT: 179 LBS | BODY MASS INDEX: 25.06 KG/M2

## 2022-09-27 DIAGNOSIS — R26.9 GAIT DISORDER: Primary | ICD-10-CM

## 2022-09-27 DIAGNOSIS — R29.898 LEG WEAKNESS, BILATERAL: ICD-10-CM

## 2022-09-27 PROCEDURE — 99204 OFFICE O/P NEW MOD 45 MIN: CPT | Performed by: FAMILY MEDICINE

## 2022-10-03 ENCOUNTER — TELEPHONE (OUTPATIENT)
Dept: FAMILY MEDICINE CLINIC | Facility: CLINIC | Age: 77
End: 2022-10-03

## 2022-10-03 NOTE — TELEPHONE ENCOUNTER
Patient called advising that when he saw Dr. Barraza she was suppose to fax over an order  to [facility] for a scooter. I did not see an order for a scooter. Would you like to write or send an order to [facility] for this? Please Advise

## 2022-10-31 ENCOUNTER — HOSPITAL ENCOUNTER (OUTPATIENT)
Dept: PHYSICAL THERAPY | Facility: HOSPITAL | Age: 77
Setting detail: THERAPIES SERIES
Discharge: HOME | End: 2022-10-31

## 2022-10-31 DIAGNOSIS — R26.9 GAIT ABNORMALITY: Primary | ICD-10-CM

## 2022-10-31 DIAGNOSIS — Z91.81 AT RISK FOR FALLS: ICD-10-CM

## 2022-10-31 PROCEDURE — 97110 THERAPEUTIC EXERCISES: CPT

## 2022-10-31 PROCEDURE — 97162 PT EVAL MOD COMPLEX 30 MIN: CPT

## 2023-05-09 ENCOUNTER — TELEPHONE (OUTPATIENT)
Dept: FAMILY MEDICINE CLINIC | Facility: CLINIC | Age: 78
End: 2023-05-09

## 2023-05-09 NOTE — TELEPHONE ENCOUNTER
Spoke with pt and advised that he call his insurance company to see who is in network for psych. Pt voiced understanding.    Yes

## 2023-05-09 NOTE — TELEPHONE ENCOUNTER
Caller: Jake Kan    Relationship: Self    Best call back number:     What is the medical concern/diagnosis:    What specialty or service is being requested:    What is the provider, practice or medical service name:    What is the office location:     What is the office phone number:    Any additional details: PATIENT IS CALLING IN TO REQUEST A REFERRAL FOR PSYCHIATRIST

## 2023-05-24 ENCOUNTER — OFFICE VISIT (OUTPATIENT)
Dept: FAMILY MEDICINE CLINIC | Facility: CLINIC | Age: 78
End: 2023-05-24
Payer: MEDICARE

## 2023-05-24 VITALS
BODY MASS INDEX: 24.78 KG/M2 | WEIGHT: 177 LBS | SYSTOLIC BLOOD PRESSURE: 124 MMHG | OXYGEN SATURATION: 98 % | HEIGHT: 71 IN | TEMPERATURE: 98.1 F | HEART RATE: 70 BPM | DIASTOLIC BLOOD PRESSURE: 82 MMHG

## 2023-05-24 DIAGNOSIS — R41.81 AGE-RELATED COGNITIVE DECLINE: Primary | ICD-10-CM

## 2023-05-24 PROBLEM — N30.00 ACUTE CYSTITIS: Status: RESOLVED | Noted: 2021-09-03 | Resolved: 2023-05-24

## 2023-05-24 PROBLEM — U07.1 PNEUMONIA DUE TO COVID-19 VIRUS: Status: RESOLVED | Noted: 2021-09-02 | Resolved: 2023-05-24

## 2023-05-24 PROBLEM — R09.89 PHLEGM IN THROAT: Status: RESOLVED | Noted: 2017-11-29 | Resolved: 2023-05-24

## 2023-05-24 PROBLEM — J12.82 PNEUMONIA DUE TO COVID-19 VIRUS: Status: RESOLVED | Noted: 2021-09-02 | Resolved: 2023-05-24

## 2023-05-24 PROBLEM — R09.02 HYPOXIA: Status: RESOLVED | Noted: 2021-09-03 | Resolved: 2023-05-24

## 2023-05-24 PROBLEM — E86.0 DEHYDRATION: Status: RESOLVED | Noted: 2021-09-03 | Resolved: 2023-05-24

## 2023-05-24 PROBLEM — N17.9 AKI (ACUTE KIDNEY INJURY): Status: RESOLVED | Noted: 2021-09-03 | Resolved: 2023-05-24

## 2023-05-24 PROBLEM — E87.0 HYPERNATREMIA: Status: RESOLVED | Noted: 2021-09-03 | Resolved: 2023-05-24

## 2023-05-24 PROBLEM — R79.89 POSITIVE D-DIMER: Status: RESOLVED | Noted: 2021-09-03 | Resolved: 2023-05-24

## 2023-05-24 PROCEDURE — 99213 OFFICE O/P EST LOW 20 MIN: CPT | Performed by: FAMILY MEDICINE

## 2023-05-24 PROCEDURE — 1159F MED LIST DOCD IN RCRD: CPT | Performed by: FAMILY MEDICINE

## 2023-05-24 PROCEDURE — 1160F RVW MEDS BY RX/DR IN RCRD: CPT | Performed by: FAMILY MEDICINE

## 2023-05-24 RX ORDER — DONEPEZIL HYDROCHLORIDE 10 MG/1
10 TABLET, FILM COATED ORAL
Qty: 90 TABLET | Refills: 0 | Status: SHIPPED | OUTPATIENT
Start: 2023-05-24

## 2023-05-24 NOTE — PROGRESS NOTES
"  Subjective   Jake Kan is a 78 y.o. male who is here for   Chief Complaint   Patient presents with   • Memory Loss   • Altered Mental Status   .     History of Present Illness     Jake is brought in by his long-term girlfriend.  Not seen Mr. Kan in several years.  Unfortunately since then he has developed dementia.  Has carried this diagnosis for several years now.  He is no longer able to live by himself.  He was living in [facility]; he now lives with his girlfriend in [facility] [place] for the past several years  He is no longer able to drive  She manages the household finances food etc.        The following portions of the patient's history were reviewed and updated as appropriate: allergies, current medications, past family history, past medical history, past social history, past surgical history and problem list.    Review of Systems    Objective   Vitals:    05/24/23 1055   BP: 124/82   Pulse: 70   Temp: 98.1 °F (36.7 °C)   SpO2: 98%   Weight: 80.3 kg (177 lb)   Height: 180.3 cm (70.98\")      Physical Exam  Vitals reviewed.   Cardiovascular:      Rate and Rhythm: Normal rate.   Pulmonary:      Effort: Pulmonary effort is normal.   Neurological:      Mental Status: He is alert.      Cranial Nerves: Cranial nerves 2-12 are intact.      Motor: Weakness present.      Coordination: Coordination abnormal.      Gait: Gait abnormal.   Psychiatric:         Speech: Speech is delayed.         Cognition and Memory: Cognition is impaired. Memory is impaired.         Assessment & Plan   Diagnoses and all orders for this visit:    1. Age-related cognitive decline (Primary)  -     donepezil (Aricept) 10 MG tablet; Take 1 tablet by mouth Daily With Dinner.  Dispense: 90 tablet; Refill: 0    Lets restart his Aricept  See me in 2 months  There are no Patient Instructions on file for this visit.    There are no discontinued medications.     Return in about 2 months (around 7/24/2023) for new medication follow " up.    Dr. Sree Thomas  [place], [place]

## 2023-11-08 ENCOUNTER — APPOINTMENT (OUTPATIENT)
Dept: GENERAL RADIOLOGY | Facility: HOSPITAL | Age: 78
End: 2023-11-08
Payer: MEDICARE

## 2023-11-08 ENCOUNTER — HOSPITAL ENCOUNTER (INPATIENT)
Facility: HOSPITAL | Age: 78
LOS: 13 days | Discharge: HOME HEALTH | End: 2023-11-21
Attending: EMERGENCY MEDICINE | Admitting: INTERNAL MEDICINE
Payer: MEDICARE

## 2023-11-08 ENCOUNTER — APPOINTMENT (OUTPATIENT)
Dept: CT IMAGING | Facility: HOSPITAL | Age: 78
End: 2023-11-08
Payer: MEDICARE

## 2023-11-08 DIAGNOSIS — R79.89 TROPONIN LEVEL ELEVATED: ICD-10-CM

## 2023-11-08 DIAGNOSIS — R41.81 AGE-RELATED COGNITIVE DECLINE: ICD-10-CM

## 2023-11-08 DIAGNOSIS — U07.1 COVID-19 VIRUS INFECTION: ICD-10-CM

## 2023-11-08 DIAGNOSIS — M62.82 NON-TRAUMATIC RHABDOMYOLYSIS: ICD-10-CM

## 2023-11-08 DIAGNOSIS — G93.40 ACUTE ENCEPHALOPATHY: Primary | ICD-10-CM

## 2023-11-08 DIAGNOSIS — R41.82 ALTERED MENTAL STATUS, UNSPECIFIED ALTERED MENTAL STATUS TYPE: ICD-10-CM

## 2023-11-08 PROBLEM — K21.9 GASTROESOPHAGEAL REFLUX DISEASE WITHOUT ESOPHAGITIS: Status: ACTIVE | Noted: 2017-11-29

## 2023-11-08 LAB
ALBUMIN SERPL-MCNC: 4.3 G/DL (ref 3.5–5.2)
ALBUMIN/GLOB SERPL: 1.5 G/DL
ALP SERPL-CCNC: 73 U/L (ref 39–117)
ALT SERPL W P-5'-P-CCNC: 22 U/L (ref 1–41)
AMPHET+METHAMPHET UR QL: NEGATIVE
ANION GAP SERPL CALCULATED.3IONS-SCNC: 10 MMOL/L (ref 5–15)
AST SERPL-CCNC: 50 U/L (ref 1–40)
B PARAPERT DNA SPEC QL NAA+PROBE: NOT DETECTED
B PERT DNA SPEC QL NAA+PROBE: NOT DETECTED
BACTERIA UR QL AUTO: ABNORMAL /HPF
BARBITURATES UR QL SCN: NEGATIVE
BASOPHILS # BLD AUTO: 0.01 10*3/MM3 (ref 0–0.2)
BASOPHILS NFR BLD AUTO: 0.1 % (ref 0–1.5)
BENZODIAZ UR QL SCN: NEGATIVE
BILIRUB SERPL-MCNC: 0.7 MG/DL (ref 0–1.2)
BILIRUB UR QL STRIP: NEGATIVE
BUN SERPL-MCNC: 28 MG/DL (ref 8–23)
BUN/CREAT SERPL: 23 (ref 7–25)
C PNEUM DNA NPH QL NAA+NON-PROBE: NOT DETECTED
CALCIUM SPEC-SCNC: 9.7 MG/DL (ref 8.6–10.5)
CANNABINOIDS SERPL QL: NEGATIVE
CHLORIDE SERPL-SCNC: 106 MMOL/L (ref 98–107)
CK SERPL-CCNC: 2682 U/L (ref 20–200)
CLARITY UR: ABNORMAL
CO2 SERPL-SCNC: 25 MMOL/L (ref 22–29)
COCAINE UR QL: NEGATIVE
COLOR UR: ABNORMAL
CREAT SERPL-MCNC: 1.22 MG/DL (ref 0.76–1.27)
CRP SERPL-MCNC: 6.24 MG/DL (ref 0–0.5)
D DIMER PPP FEU-MCNC: 1.64 MCGFEU/ML (ref 0–0.8)
DEPRECATED RDW RBC AUTO: 45.4 FL (ref 37–54)
EGFRCR SERPLBLD CKD-EPI 2021: 59.9 ML/MIN/1.73
EOSINOPHIL # BLD AUTO: 0 10*3/MM3 (ref 0–0.4)
EOSINOPHIL NFR BLD AUTO: 0 % (ref 0.3–6.2)
ERYTHROCYTE [DISTWIDTH] IN BLOOD BY AUTOMATED COUNT: 13.2 % (ref 12.3–15.4)
ETHANOL BLD-MCNC: <10 MG/DL (ref 0–10)
ETHANOL UR QL: <0.01 %
FENTANYL UR-MCNC: NEGATIVE NG/ML
FERRITIN SERPL-MCNC: 377 NG/ML (ref 30–400)
FLUAV SUBTYP SPEC NAA+PROBE: NOT DETECTED
FLUBV RNA ISLT QL NAA+PROBE: NOT DETECTED
GEN 5 2HR TROPONIN T REFLEX: 26 NG/L
GLOBULIN UR ELPH-MCNC: 2.8 GM/DL
GLUCOSE SERPL-MCNC: 121 MG/DL (ref 65–99)
GLUCOSE UR STRIP-MCNC: NEGATIVE MG/DL
HADV DNA SPEC NAA+PROBE: NOT DETECTED
HCOV 229E RNA SPEC QL NAA+PROBE: NOT DETECTED
HCOV HKU1 RNA SPEC QL NAA+PROBE: NOT DETECTED
HCOV NL63 RNA SPEC QL NAA+PROBE: NOT DETECTED
HCOV OC43 RNA SPEC QL NAA+PROBE: NOT DETECTED
HCT VFR BLD AUTO: 40.4 % (ref 37.5–51)
HGB BLD-MCNC: 13.9 G/DL (ref 13–17.7)
HGB UR QL STRIP.AUTO: ABNORMAL
HMPV RNA NPH QL NAA+NON-PROBE: NOT DETECTED
HPIV1 RNA ISLT QL NAA+PROBE: NOT DETECTED
HPIV2 RNA SPEC QL NAA+PROBE: NOT DETECTED
HPIV3 RNA NPH QL NAA+PROBE: NOT DETECTED
HPIV4 P GENE NPH QL NAA+PROBE: NOT DETECTED
HYALINE CASTS UR QL AUTO: ABNORMAL /LPF
IMM GRANULOCYTES # BLD AUTO: 0.02 10*3/MM3 (ref 0–0.05)
IMM GRANULOCYTES NFR BLD AUTO: 0.3 % (ref 0–0.5)
INR PPP: 1.05 (ref 0.9–1.1)
KETONES UR QL STRIP: ABNORMAL
LEUKOCYTE ESTERASE UR QL STRIP.AUTO: NEGATIVE
LYMPHOCYTES # BLD AUTO: 0.59 10*3/MM3 (ref 0.7–3.1)
LYMPHOCYTES NFR BLD AUTO: 7.4 % (ref 19.6–45.3)
M PNEUMO IGG SER IA-ACNC: NOT DETECTED
MAGNESIUM SERPL-MCNC: 2 MG/DL (ref 1.6–2.4)
MCH RBC QN AUTO: 32.4 PG (ref 26.6–33)
MCHC RBC AUTO-ENTMCNC: 34.4 G/DL (ref 31.5–35.7)
MCV RBC AUTO: 94.2 FL (ref 79–97)
METHADONE UR QL SCN: NEGATIVE
MONOCYTES # BLD AUTO: 0.88 10*3/MM3 (ref 0.1–0.9)
MONOCYTES NFR BLD AUTO: 11 % (ref 5–12)
NEUTROPHILS NFR BLD AUTO: 6.47 10*3/MM3 (ref 1.7–7)
NEUTROPHILS NFR BLD AUTO: 81.2 % (ref 42.7–76)
NITRITE UR QL STRIP: NEGATIVE
NRBC BLD AUTO-RTO: 0 /100 WBC (ref 0–0.2)
OPIATES UR QL: NEGATIVE
OXYCODONE UR QL SCN: NEGATIVE
PH UR STRIP.AUTO: 5.5 [PH] (ref 5–8)
PLATELET # BLD AUTO: 210 10*3/MM3 (ref 140–450)
PMV BLD AUTO: 10 FL (ref 6–12)
POTASSIUM SERPL-SCNC: 4.2 MMOL/L (ref 3.5–5.2)
PROCALCITONIN SERPL-MCNC: 0.11 NG/ML (ref 0–0.25)
PROT SERPL-MCNC: 7.1 G/DL (ref 6–8.5)
PROT UR QL STRIP: ABNORMAL
PROTHROMBIN TIME: 13.8 SECONDS (ref 11.7–14.2)
RBC # BLD AUTO: 4.29 10*6/MM3 (ref 4.14–5.8)
RBC # UR STRIP: ABNORMAL /HPF
REF LAB TEST METHOD: ABNORMAL
RHINOVIRUS RNA SPEC NAA+PROBE: NOT DETECTED
RSV RNA NPH QL NAA+NON-PROBE: NOT DETECTED
SARS-COV-2 RNA NPH QL NAA+NON-PROBE: DETECTED
SODIUM SERPL-SCNC: 141 MMOL/L (ref 136–145)
SP GR UR STRIP: >=1.03 (ref 1–1.03)
SQUAMOUS #/AREA URNS HPF: ABNORMAL /HPF
TROPONIN T DELTA: 4 NG/L
TROPONIN T SERPL HS-MCNC: 22 NG/L
TSH SERPL DL<=0.05 MIU/L-ACNC: 1.35 UIU/ML (ref 0.27–4.2)
UROBILINOGEN UR QL STRIP: ABNORMAL
WBC # UR STRIP: ABNORMAL /HPF
WBC NRBC COR # BLD: 7.97 10*3/MM3 (ref 3.4–10.8)

## 2023-11-08 PROCEDURE — 83735 ASSAY OF MAGNESIUM: CPT | Performed by: EMERGENCY MEDICINE

## 2023-11-08 PROCEDURE — 71045 X-RAY EXAM CHEST 1 VIEW: CPT

## 2023-11-08 PROCEDURE — 86140 C-REACTIVE PROTEIN: CPT | Performed by: INTERNAL MEDICINE

## 2023-11-08 PROCEDURE — 80307 DRUG TEST PRSMV CHEM ANLYZR: CPT | Performed by: EMERGENCY MEDICINE

## 2023-11-08 PROCEDURE — 85610 PROTHROMBIN TIME: CPT | Performed by: EMERGENCY MEDICINE

## 2023-11-08 PROCEDURE — 85025 COMPLETE CBC W/AUTO DIFF WBC: CPT | Performed by: EMERGENCY MEDICINE

## 2023-11-08 PROCEDURE — 36415 COLL VENOUS BLD VENIPUNCTURE: CPT

## 2023-11-08 PROCEDURE — 70450 CT HEAD/BRAIN W/O DYE: CPT

## 2023-11-08 PROCEDURE — 93005 ELECTROCARDIOGRAM TRACING: CPT | Performed by: EMERGENCY MEDICINE

## 2023-11-08 PROCEDURE — 81001 URINALYSIS AUTO W/SCOPE: CPT | Performed by: EMERGENCY MEDICINE

## 2023-11-08 PROCEDURE — 25810000003 SODIUM CHLORIDE 0.9 % SOLUTION: Performed by: EMERGENCY MEDICINE

## 2023-11-08 PROCEDURE — 82728 ASSAY OF FERRITIN: CPT | Performed by: INTERNAL MEDICINE

## 2023-11-08 PROCEDURE — 99285 EMERGENCY DEPT VISIT HI MDM: CPT

## 2023-11-08 PROCEDURE — 84484 ASSAY OF TROPONIN QUANT: CPT | Performed by: EMERGENCY MEDICINE

## 2023-11-08 PROCEDURE — 82550 ASSAY OF CK (CPK): CPT | Performed by: EMERGENCY MEDICINE

## 2023-11-08 PROCEDURE — 85379 FIBRIN DEGRADATION QUANT: CPT | Performed by: INTERNAL MEDICINE

## 2023-11-08 PROCEDURE — 25810000003 LACTATED RINGERS PER 1000 ML: Performed by: INTERNAL MEDICINE

## 2023-11-08 PROCEDURE — 93010 ELECTROCARDIOGRAM REPORT: CPT | Performed by: INTERNAL MEDICINE

## 2023-11-08 PROCEDURE — 0202U NFCT DS 22 TRGT SARS-COV-2: CPT | Performed by: EMERGENCY MEDICINE

## 2023-11-08 PROCEDURE — 80053 COMPREHEN METABOLIC PANEL: CPT | Performed by: EMERGENCY MEDICINE

## 2023-11-08 PROCEDURE — 84443 ASSAY THYROID STIM HORMONE: CPT | Performed by: EMERGENCY MEDICINE

## 2023-11-08 PROCEDURE — 84145 PROCALCITONIN (PCT): CPT | Performed by: INTERNAL MEDICINE

## 2023-11-08 PROCEDURE — 82077 ASSAY SPEC XCP UR&BREATH IA: CPT | Performed by: EMERGENCY MEDICINE

## 2023-11-08 RX ORDER — POLYETHYLENE GLYCOL 3350 17 G/17G
17 POWDER, FOR SOLUTION ORAL DAILY PRN
Status: DISCONTINUED | OUTPATIENT
Start: 2023-11-08 | End: 2023-11-21 | Stop reason: HOSPADM

## 2023-11-08 RX ORDER — SODIUM CHLORIDE, SODIUM LACTATE, POTASSIUM CHLORIDE, CALCIUM CHLORIDE 600; 310; 30; 20 MG/100ML; MG/100ML; MG/100ML; MG/100ML
75 INJECTION, SOLUTION INTRAVENOUS CONTINUOUS
Status: DISCONTINUED | OUTPATIENT
Start: 2023-11-08 | End: 2023-11-14

## 2023-11-08 RX ORDER — SODIUM CHLORIDE 0.9 % (FLUSH) 0.9 %
10 SYRINGE (ML) INJECTION AS NEEDED
Status: DISCONTINUED | OUTPATIENT
Start: 2023-11-08 | End: 2023-11-21 | Stop reason: HOSPADM

## 2023-11-08 RX ORDER — BISACODYL 10 MG
10 SUPPOSITORY, RECTAL RECTAL DAILY PRN
Status: DISCONTINUED | OUTPATIENT
Start: 2023-11-08 | End: 2023-11-21 | Stop reason: HOSPADM

## 2023-11-08 RX ORDER — SODIUM CHLORIDE 9 MG/ML
125 INJECTION, SOLUTION INTRAVENOUS CONTINUOUS
Status: DISCONTINUED | OUTPATIENT
Start: 2023-11-08 | End: 2023-11-08

## 2023-11-08 RX ORDER — AMOXICILLIN 250 MG
2 CAPSULE ORAL 2 TIMES DAILY
Status: DISCONTINUED | OUTPATIENT
Start: 2023-11-08 | End: 2023-11-21 | Stop reason: HOSPADM

## 2023-11-08 RX ORDER — BISACODYL 5 MG/1
5 TABLET, DELAYED RELEASE ORAL DAILY PRN
Status: DISCONTINUED | OUTPATIENT
Start: 2023-11-08 | End: 2023-11-21 | Stop reason: HOSPADM

## 2023-11-08 RX ORDER — SODIUM CHLORIDE 0.9 % (FLUSH) 0.9 %
10 SYRINGE (ML) INJECTION EVERY 12 HOURS SCHEDULED
Status: DISCONTINUED | OUTPATIENT
Start: 2023-11-08 | End: 2023-11-17

## 2023-11-08 RX ORDER — NITROGLYCERIN 0.4 MG/1
0.4 TABLET SUBLINGUAL
Status: DISCONTINUED | OUTPATIENT
Start: 2023-11-08 | End: 2023-11-21 | Stop reason: HOSPADM

## 2023-11-08 RX ORDER — SODIUM CHLORIDE 9 MG/ML
40 INJECTION, SOLUTION INTRAVENOUS AS NEEDED
Status: DISCONTINUED | OUTPATIENT
Start: 2023-11-08 | End: 2023-11-21 | Stop reason: HOSPADM

## 2023-11-08 RX ADMIN — NIRMATRELVIR AND RITONAVIR 3 TABLET: KIT at 20:33

## 2023-11-08 RX ADMIN — SODIUM CHLORIDE 500 ML: 9 INJECTION, SOLUTION INTRAVENOUS at 08:24

## 2023-11-08 RX ADMIN — SENNOSIDES AND DOCUSATE SODIUM 2 TABLET: 50; 8.6 TABLET ORAL at 20:33

## 2023-11-08 RX ADMIN — Medication 10 ML: at 14:10

## 2023-11-08 RX ADMIN — SODIUM CHLORIDE 125 ML/HR: 9 INJECTION, SOLUTION INTRAVENOUS at 08:59

## 2023-11-08 RX ADMIN — ZINC OXIDE 1 APPLICATION: 200 OINTMENT TOPICAL at 20:32

## 2023-11-08 RX ADMIN — SODIUM CHLORIDE, POTASSIUM CHLORIDE, SODIUM LACTATE AND CALCIUM CHLORIDE 125 ML/HR: 600; 310; 30; 20 INJECTION, SOLUTION INTRAVENOUS at 14:10

## 2023-11-08 RX ADMIN — Medication 10 ML: at 20:33

## 2023-11-08 NOTE — H&P
Patient Name:  Jake Kan  YOB: 1945  MRN:  [id number]  Admit Date:  11/8/2023  Patient Care Team:  Sree Thomas MD as PCP - General (Family Medicine)      Subjective   History Present Illness     Chief Complaint   Patient presents with    Weakness - Generalized       Mr. Kan is a 78 y.o. non-smoker with a history of  that presents to [facility] complaining of HLD, GERD, CKD Stage 3a, and dementia presenting with generalized weakness. He is unable to provide history as he is quite confused and he has no family at bedside. He apparently lives at home alone with his significant other and he got on the floor last night and was too weak to get up, resulting in his significant other calling EMS. He currently denies complaints other than being tired.      Review of Systems   Unable to perform ROS: Dementia        Personal History     Past Medical History:   Diagnosis Date    Pneumonia due to COVID-19 virus 9/2/2021     Past Surgical History:   Procedure Laterality Date    HERNIA REPAIR  1988    UMBILICAL HERNIA REPAIR N/A 9/23/2019    Procedure: excision of umbilical hernia mesh and umbilical hernia repair;  Surgeon: Luan Dowell MD;  Location: [facility] OR;  Service: General    WRIST FRACTURE SURGERY Left 1998     Family History   Problem Relation Age of Onset    COPD Sister     No Known Problems Brother     No Known Problems Daughter      Social History     Tobacco Use    Smoking status: Never    Smokeless tobacco: Never   Vaping Use    Vaping Use: Never used   Substance Use Topics    Alcohol use: No    Drug use: No     No current facility-administered medications on file prior to encounter.     Current Outpatient Medications on File Prior to Encounter   Medication Sig Dispense Refill    donepezil (Aricept) 10 MG tablet Take 1 tablet by mouth Daily With Dinner. 90 tablet 0    lansoprazole (PREVACID) 15 MG capsule Take 1 capsule by mouth Daily.       No Known  Allergies    Objective    Objective     Vital Signs  Temp:  [97.2 °F (36.2 °C)-97.3 °F (36.3 °C)] 97.3 °F (36.3 °C)  Heart Rate:  [58-77] 58  Resp:  [16-18] 16  BP: (145-167)/(72-86) 160/81  SpO2:  [94 %-100 %] 100 %  on   ;   Device (Oxygen Therapy): room air  Body mass index is 22.81 kg/m².    Physical Exam  Vitals and nursing note reviewed.   Constitutional:       General: He is not in acute distress.     Appearance: He is ill-appearing. He is not toxic-appearing or diaphoretic.   HENT:      Head: Normocephalic and atraumatic.      Nose: Nose normal.      Mouth/Throat:      Mouth: Mucous membranes are moist.      Pharynx: Oropharynx is clear.   Eyes:      Conjunctiva/sclera: Conjunctivae normal.      Pupils: Pupils are equal, round, and reactive to light.   Cardiovascular:      Rate and Rhythm: Normal rate and regular rhythm.      Pulses: Normal pulses.   Pulmonary:      Effort: Pulmonary effort is normal.      Breath sounds: Normal breath sounds. No rales.   Abdominal:      General: Bowel sounds are normal.      Palpations: Abdomen is soft.      Tenderness: There is no abdominal tenderness.   Musculoskeletal:         General: No swelling or tenderness.      Cervical back: Neck supple.   Skin:     General: Skin is warm and dry.      Capillary Refill: Capillary refill takes less than 2 seconds.   Neurological:      General: No focal deficit present.      Mental Status: He is lethargic.   Psychiatric:         Mood and Affect: Affect is flat.         Cognition and Memory: Cognition is impaired.         Results Review:  I reviewed the patient's new clinical results.  I reviewed the patient's new imaging results and agree with the interpretation.  I reviewed the patient's other test results and agree with the interpretation  I personally viewed and interpreted the patient's EKG/Telemetry data  Discussed with ED provider.    Lab Results (last 24 hours)       Procedure Component Value Units Date/Time    CBC &  Differential [[id number]]  (Abnormal) Collected: 11/08/23 0822    Specimen: Blood Updated: 11/08/23 0835    Narrative:      The following orders were created for panel order CBC & Differential.  Procedure                               Abnormality         Status                     ---------                               -----------         ------                     CBC Auto Differential[[id number]]        Abnormal            Final result                 Please view results for these tests on the individual orders.    Comprehensive Metabolic Panel [[id number]]  (Abnormal) Collected: 11/08/23 0822    Specimen: Blood Updated: 11/08/23 0855     Glucose 121 mg/dL      BUN 28 mg/dL      Creatinine 1.22 mg/dL      Sodium 141 mmol/L      Potassium 4.2 mmol/L      Comment: Slight hemolysis detected by analyzer. Result may be falsely elevated.        Chloride 106 mmol/L      CO2 25.0 mmol/L      Calcium 9.7 mg/dL      Total Protein 7.1 g/dL      Albumin 4.3 g/dL      ALT (SGPT) 22 U/L      AST (SGOT) 50 U/L      Alkaline Phosphatase 73 U/L      Total Bilirubin 0.7 mg/dL      Globulin 2.8 gm/dL      A/G Ratio 1.5 g/dL      BUN/Creatinine Ratio 23.0     Anion Gap 10.0 mmol/L      eGFR 59.9 mL/min/1.73     Narrative:      GFR Normal >60  Chronic Kidney Disease <60  Kidney Failure <15    The GFR formula is only valid for adults with stable renal function between ages 18 and 70.    Protime-INR [[id number]]  (Normal) Collected: 11/08/23 0822    Specimen: Blood Updated: 11/08/23 0849     Protime 13.8 Seconds      INR 1.05    High Sensitivity Troponin T [[id number]]  (Abnormal) Collected: 11/08/23 0822    Specimen: Blood Updated: 11/08/23 0901     HS Troponin T 22 ng/L     Narrative:      High Sensitive Troponin T Reference Range:  <14.0 ng/L- Negative Female for AMI  <22.0 ng/L- Negative Male for AMI  >=14 - Abnormal Female indicating possible myocardial injury.  >=22 - Abnormal Male indicating possible myocardial injury.   Clinicians  would have to utilize clinical acumen, EKG, Troponin, and serial changes to determine if it is an Acute Myocardial Infarction or myocardial injury due to an underlying chronic condition.         Magnesium [[id number]]  (Normal) Collected: 11/08/23 0822    Specimen: Blood Updated: 11/08/23 0855     Magnesium 2.0 mg/dL     CK [[id number]]  (Abnormal) Collected: 11/08/23 0822    Specimen: Blood Updated: 11/08/23 0907     Creatine Kinase 2,682 U/L     Ethanol [[id number]] Collected: 11/08/23 0822    Specimen: Blood Updated: 11/08/23 0855     Ethanol <10 mg/dL      Ethanol % <0.010 %     TSH [[id number]]  (Normal) Collected: 11/08/23 0822    Specimen: Blood Updated: 11/08/23 0901     TSH 1.350 uIU/mL     CBC Auto Differential [[id number]]  (Abnormal) Collected: 11/08/23 0822    Specimen: Blood Updated: 11/08/23 0835     WBC 7.97 10*3/mm3      RBC 4.29 10*6/mm3      Hemoglobin 13.9 g/dL      Hematocrit 40.4 %      MCV 94.2 fL      MCH 32.4 pg      MCHC 34.4 g/dL      RDW 13.2 %      RDW-SD 45.4 fl      MPV 10.0 fL      Platelets 210 10*3/mm3      Neutrophil % 81.2 %      Lymphocyte % 7.4 %      Monocyte % 11.0 %      Eosinophil % 0.0 %      Basophil % 0.1 %      Immature Grans % 0.3 %      Neutrophils, Absolute 6.47 10*3/mm3      Lymphocytes, Absolute 0.59 10*3/mm3      Monocytes, Absolute 0.88 10*3/mm3      Eosinophils, Absolute 0.00 10*3/mm3      Basophils, Absolute 0.01 10*3/mm3      Immature Grans, Absolute 0.02 10*3/mm3      nRBC 0.0 /100 WBC     D-dimer, Quantitative [[id number]]  (Abnormal) Collected: 11/08/23 0822    Specimen: Blood Updated: 11/08/23 1401     D-Dimer, Quantitative 1.64 MCGFEU/mL     Narrative:      According to the assay 's published package insert, a normal (<0.50 MCGFEU/mL) D-dimer result in conjunction with a non-high clinical probability assessment, excludes deep vein thrombosis (DVT) and pulmonary embolism (PE) with high sensitivity.    D-dimer values increase with age and this can make  "VTE exclusion of an older population difficult. To address this, the [employer], based on best available evidence and recent guidelines, recommends that clinicians use age-adjusted D-dimer thresholds in patients greater than 50 years of age with: a) a low probability of PE who do not meet all Pulmonary Embolism Rule Out Criteria, or b) in those with intermediate probability of PE.   The formula for an age-adjusted D-dimer cut-off is \"age/100\".  For example, a 60 year old patient would have an age-adjusted cut-off of 0.60 MCGFEU/mL and an 80 year old 0.80 MCGFEU/mL.    Urinalysis With Microscopic If Indicated (No Culture) - Urine, Clean Catch [[id number]]  (Abnormal) Collected: 11/08/23 0859    Specimen: Urine, Clean Catch Updated: 11/08/23 0915     Color, UA Dark Yellow     Appearance, UA Cloudy     pH, UA 5.5     Specific Gravity, UA >=1.030     Glucose, UA Negative     Ketones, UA Trace     Bilirubin, UA Negative     Blood, UA Trace     Protein, UA 30 mg/dL (1+)     Leuk Esterase, UA Negative     Nitrite, UA Negative     Urobilinogen, UA 1.0 E.U./dL    Urine Drug Screen - Urine, Clean Catch [[id number]]  (Normal) Collected: 11/08/23 0859    Specimen: Urine, Clean Catch Updated: 11/08/23 0928     Amphet/Methamphet, Screen Negative     Barbiturates Screen, Urine Negative     Benzodiazepine Screen, Urine Negative     Cocaine Screen, Urine Negative     Opiate Screen Negative     THC, Screen, Urine Negative     Methadone Screen, Urine Negative     Oxycodone Screen, Urine Negative     Fentanyl, Urine Negative    Narrative:      Negative Thresholds Per Drugs Screened:    Amphetamines                 500 ng/ml  Barbiturates                 200 ng/ml  Benzodiazepines              100 ng/ml  Cocaine                      300 ng/ml  Methadone                    300 ng/ml  Opiates                      300 ng/ml  Oxycodone                    100 ng/ml  THC                           50 ng/ml  Fentanyl           "             5 ng/ml      The Normal Value for all drugs tested is negative. This report includes final unconfirmed screening results to be used for medical treatment purposes only. Unconfirmed results must not be used for non-medical purposes such as employment or legal testing. Clinical consideration should be applied to any drug of abuse test, particularly when unconfirmed results are used.            Urinalysis, Microscopic Only - Urine, Clean Catch [[id number]]  (Abnormal) Collected: 11/08/23 0859    Specimen: Urine, Clean Catch Updated: 11/08/23 0915     RBC, UA 6-10 /HPF      WBC, UA 0-2 /HPF      Bacteria, UA None Seen /HPF      Squamous Epithelial Cells, UA 0-2 /HPF      Hyaline Casts, UA 7-12 /LPF      Methodology Automated Microscopy    Respiratory Panel PCR w/COVID-19(SARS-CoV-2) PABLO/HE/LORETA/PAD/[facility] In-House, NP Swab in UTM/VTM, 3-4 HR TAT - Swab, Nasopharynx [[id number]]  (Abnormal) Collected: 11/08/23 0900    Specimen: Swab from Nasopharynx Updated: 11/08/23 1004     ADENOVIRUS, PCR Not Detected     Coronavirus 229E Not Detected     Coronavirus HKU1 Not Detected     Coronavirus NL63 Not Detected     Coronavirus OC43 Not Detected     COVID19 Detected     Human Metapneumovirus Not Detected     Human Rhinovirus/Enterovirus Not Detected     Influenza A PCR Not Detected     Influenza B PCR Not Detected     Parainfluenza Virus 1 Not Detected     Parainfluenza Virus 2 Not Detected     Parainfluenza Virus 3 Not Detected     Parainfluenza Virus 4 Not Detected     RSV, PCR Not Detected     Bordetella pertussis pcr Not Detected     Bordetella parapertussis PCR Not Detected     Chlamydophila pneumoniae PCR Not Detected     Mycoplasma pneumo by PCR Not Detected    Narrative:      In the setting of a positive respiratory panel with a viral infection PLUS a negative procalcitonin without other underlying concern for bacterial infection, consider observing off antibiotics or discontinuation of antibiotics and  continue supportive care. If the respiratory panel is positive for atypical bacterial infection (Bordetella pertussis, Chlamydophila pneumoniae, or Mycoplasma pneumoniae), consider antibiotic de-escalation to target atypical bacterial infection.    High Sensitivity Troponin T 2Hr [[id number]]  (Abnormal) Collected: 11/08/23 1043    Specimen: Blood Updated: 11/08/23 1118     HS Troponin T 26 ng/L      Troponin T Delta 4 ng/L     Narrative:      High Sensitive Troponin T Reference Range:  <14.0 ng/L- Negative Female for AMI  <22.0 ng/L- Negative Male for AMI  >=14 - Abnormal Female indicating possible myocardial injury.  >=22 - Abnormal Male indicating possible myocardial injury.   Clinicians would have to utilize clinical acumen, EKG, Troponin, and serial changes to determine if it is an Acute Myocardial Infarction or myocardial injury due to an underlying chronic condition.         C-reactive Protein [[id number]]  (Abnormal) Collected: 11/08/23 1043    Specimen: Blood Updated: 11/08/23 1353     C-Reactive Protein 6.24 mg/dL     Ferritin [[id number]]  (Normal) Collected: 11/08/23 1043    Specimen: Blood Updated: 11/08/23 1400     Ferritin 377.00 ng/mL     Narrative:      Results may be falsely decreased if patient taking Biotin.      Procalcitonin [[id number]]  (Normal) Collected: 11/08/23 1043    Specimen: Blood Updated: 11/08/23 1400     Procalcitonin 0.11 ng/mL     Narrative:      As a Marker for Sepsis (Non-Neonates):    1. <0.5 ng/mL represents a low risk of severe sepsis and/or septic shock.  2. >2 ng/mL represents a high risk of severe sepsis and/or septic shock.    As a Marker for Lower Respiratory Tract Infections that require antibiotic therapy:    PCT on Admission    Antibiotic Therapy       6-12 Hrs later    >0.5                Strongly Recommended  >0.25 - <0.5        Recommended   0.1 - 0.25          Discouraged              Remeasure/reassess PCT  <0.1                Strongly Discouraged      "Remeasure/reassess PCT    As 28 day mortality risk marker: \"Change in Procalcitonin Result\" (>80% or <=80%) if Day 0 (or Day 1) and Day 4 values are available. Refer to http://www.[facility]-pct-calculator.com    Change in PCT <=80%  A decrease of PCT levels below or equal to 80% defines a positive change in PCT test result representing a higher risk for 28-day all-cause mortality of patients diagnosed with severe sepsis for septic shock.    Change in PCT >80%  A decrease of PCT levels of more than 80% defines a negative change in PCT result representing a lower risk for 28-day all-cause mortality of patients diagnosed with severe sepsis or septic shock.               Imaging Results (Last 24 Hours)       Procedure Component Value Units Date/Time    CT Head Without Contrast [[id number]] Collected: 11/08/23 0922     Updated: 11/08/23 0922    Narrative:      CT HEAD WITHOUT CONTRAST     HISTORY: Dizziness, found on floor.     COMPARISON: CT head 08/24/2022.     FINDINGS: There is no evidence of fracture or of intracranial  hemorrhage. A scalp hematoma is noted in the frontal region anteriorly.  The brain and ventricles are symmetrical. There is no evidence of acute  infarction, hydrocephalus or midline shift. A more nodular area of soft  tissue is appreciated overlying the frontal bone to the left of midline  superiorly measuring 6 mm in size. This is new versus the CT examination  of 08/24/2022.       Impression:      A scalp hematoma is noted in the frontal region. There is no  evidence of fracture or of intracranial hemorrhage. Further evaluation  could be performed with a MRI examination of the brain as indicated.  There is a 6 mm nodule involving the scalp overlying the frontal bone to  the left superiorly which is new versus the prior examination. This may  be sequela from trauma or potentially a new nodule/sebaceous cyst. A  follow-up CT examination of the head is recommended. If this nodule  persists, it can be " further characterized with a MRI examination of the  brain with and without contrast.     Radiation dose reduction techniques were utilized, including automated  exposure control and exposure modulation based on body size.          XR Chest 1 View [[id number]] Collected: 11/08/23 0849     Updated: 11/08/23 0855    Narrative:      XR CHEST 1 VW-11/8/2023     HISTORY: Weakness. Patient down with decreased responsiveness.     Heart size is within normal limits. The lungs appear free of acute  infiltrates. There is deformity of the left mid clavicle from an old  left mid clavicle fracture. Bones and soft tissues are otherwise  unremarkable.     There is slight blunting of the left costophrenic sulcus.       Impression:      1. Slight blunting of the left costophrenic sulcus which may be related  to a small left pleural effusion.  2. No other significant findings are noted.        This report was finalized on 11/8/2023 8:52 AM by Dr. Justin Hirsch M.D on Workstation: [id number]                   ECG 12 Lead Other; Down on the floor and weakness   Preliminary Result   HEART RATE= 72  bpm   RR Interval= 833  ms   DE Interval= 167  ms   P Horizontal Axis= -3  deg   P Front Axis= 65  deg   QRSD Interval= 134  ms   QT Interval= 410  ms   QTcB= 449  ms   QRS Axis= -51  deg   T Wave Axis= 9  deg   - ABNORMAL ECG -   Sinus rhythm   RBBB and LAFB   Probable left ventricular hypertrophy   Anterior Q waves, possibly due to LVH   Electronically Signed By:    Date and Time of Study: 2023-11-08 08:24:37           Assessment/Plan     Active Hospital Problems    Diagnosis  POA    **Pneumonia due to COVID-19 virus [U07.1, J12.82]  Yes    Acute encephalopathy [G93.40]  Yes    Non-traumatic rhabdomyolysis [M62.82]  Yes    Stage 3a chronic kidney disease [N18.31]  Yes    Metabolic encephalopathy [G93.41]  Yes    Gastroesophageal reflux disease without esophagitis [K21.9]  Yes    Hyperlipidemia [E78.5]  Yes      Resolved Hospital Problems    No resolved problems to display.   Non-Traumatic Rhabdomyolysis  - from prolonged time on his floor, no evidence of compartment syndrome or pigment nephropathy  - will give liberal IV fluids and monitor renal function, electrolytes, and CK levels    COVID-19 Pneumonia  - no evidence of bacterial pneumonia  - not hypoxic, therefore no steroids indicated  - will start on paxlovid  - monitor inflammatory markers, LFTs, renal function, D-dimer, and procalcitonin    Stage 3a CKD  - appears stable  - will monitor    Metabolic Encephalopathy  - superimposed on known dementia, likely due to COVID-19  - monitor on above treatment    I discussed the patient's findings and my recommendations with patient, nursing staff, and ED provider.    VTE Prophylaxis - SCDs.  Code Status - Full code.       Frandy Linn MD  [facility]  11/08/23  15:30 EST

## 2023-11-08 NOTE — ED PROVIDER NOTES
EMERGENCY DEPARTMENT ENCOUNTER    Room Number:  E652/1  Date of encounter:  11/8/2023  PCP: Sree Thomas MD  Historian: EMS and patient  Relevant information and history provided by sources other than the patient will be included below and in the ED Course.  Review of pertinent past medical records may also be included in record below and ED Course.    HPI:  Chief Complaint: Weakness and down on floor all night  A complete HPI/ROS/PMH/PSH/SH/FH are unobtainable due to: Patient has some dementia.  He cannot provide complete history concerning recent events or his past history as they are limited  Context: Jake Kan is a 78 y.o. male who presents to the ED c/o this is an elderly man who was called by patient's significant other because patient was too weak to get up.  According to EMS last night patient got to the floor and has been on the floor at least all night.  Patient was unable to get up off the floor and the patient's significant other was unable to assist him in getting up off the floor.  He initially was laying on his back and then rolled over to his side.  We believe that he was rolling on his right side.  He had urinated on himself prior to arrival here.  No family or significant other is currently present in the emergency department.  Patient cannot remember specifics of of the events.  He denies any pain anywhere.  He really denies any specific complaints and he is not quite certain why he is here.  He denies any headache, chest pain or shortness of breath.  He is aware that he is on Prevacid.  He denies any other medical problems currently for past.  Denies chest pain, shortness of breath, abdominal pain.  He states that people have told him that when he speaks his words run together.  He is not certain if he has more garbled speech or slurred speech than normal.  According to EMS and their conversation with significant other they state that his speech is at baseline.  He is unaware of any  fevers or chills or recent coughs or colds.        Previous Episodes: Unknown  Current Symptoms: See above    MEDICAL HISTORY REVIEWED    In reviewing old records I saw the patient was discharged from [facility] on 9/15/2021.  He had COVID and pneumonia with that.  Also has a history of chronic kidney disease at that time there was mention that he was a poor historian on that evaluation.  I can review the note from Dr. Peralta from 9/27/2022 does have a history of dementia did have gait disturbance as well and bilateral leg weakness that is mentioned.  I can see a note from Dr. Tovar from 5/24/2023 patient has a history of dementia and is on Aricept mention that he has memory loss and periods of altered mental status.    PAST MEDICAL HISTORY  Active Ambulatory Problems     Diagnosis Date Noted    Sciatica of right side 11/29/2017    Gastroesophageal reflux disease without esophagitis 11/29/2017    Abnormal EKG 08/29/2012    Hyperlipidemia 03/16/2011    Rosacea 07/11/2012    Seborrheic dermatitis 03/19/2012    Pneumonia due to COVID-19 virus 09/02/2021    Metabolic encephalopathy 09/03/2021    Stage 3a chronic kidney disease 09/03/2021    Cytokine release syndrome, grade 2 09/15/2021    Age-related cognitive decline 05/24/2023     Resolved Ambulatory Problems     Diagnosis Date Noted    Phlegm in throat 11/29/2017    Debility 08/29/2012    Other symptoms involving digestive system 03/29/2011    Paresthesias 03/19/2012    Pruritus 01/04/2012    Abscess, umbilical 08/07/2019    Dehydration 09/03/2021    Hypernatremia 09/03/2021    ANA (acute kidney injury) 09/03/2021    Positive D-dimer 09/03/2021    Acute cystitis 09/03/2021    Hypoxia 09/03/2021     No Additional Past Medical History         PAST SURGICAL HISTORY  Past Surgical History:   Procedure Laterality Date    HERNIA REPAIR  1988    UMBILICAL HERNIA REPAIR N/A 9/23/2019    Procedure: excision of umbilical hernia mesh and umbilical hernia repair;  Surgeon: Delmer  Luan MAY MD;  Location: [facility] MAIN OR;  Service: General    WRIST FRACTURE SURGERY Left 1998         FAMILY HISTORY  Family History   Problem Relation Age of Onset    COPD Sister     No Known Problems Brother     No Known Problems Daughter          SOCIAL HISTORY  Social History     Socioeconomic History    Marital status: Single    Number of children: 1   Tobacco Use    Smoking status: Never    Smokeless tobacco: Never   Vaping Use    Vaping Use: Never used   Substance and Sexual Activity    Alcohol use: No    Drug use: No    Sexual activity: Not Currently     Partners: Female         ALLERGIES  Patient has no known allergies.        REVIEW OF SYSTEMS  Review of Systems     All systems reviewed and negative except for those discussed in HPI.       PHYSICAL EXAM    I have reviewed the triage vital signs and nursing notes.    ED Triage Vitals [11/08/23 0759]   Temp Heart Rate Resp BP SpO2   97.2 °F (36.2 °C) 68 18 161/81 98 %      Temp src Heart Rate Source Patient Position BP Location FiO2 (%)   -- -- -- -- --       GENERAL: This is an elderly male that appears frail and chronically ill.  He is poorly kept.  He has urinated on himself.  No acute distress.Vital signs on my initial evaluation on my evaluation his oxygen saturation 9 9% on room air repeat blood pressure is 125/81 he is afebrile with normal pulse.  HENT: nares patent  To the right side of his face and forehead there is some mild erythema and swelling.  It has the appearance that he has been laying on his right side of his face for at least a prolonged period of time.  Does have mild periorbital edema more prominent on the right compared to the left.  There is no obvious drainage or bleeding.  EYES: no scleral icterus, no conjunctival pallor.  Extraocular muscles are intact.  No new visual impairment.  Pupils equal round reactive to light  NECK: Supple, no meningismus  CV: regular rhythm, regular rate with intact distal pulses.  To the anterior lower  right portion of his chest and right abdomen you can see some mild erythema which likely relates to him laying on that side of his body.  He has no pain with palpation to his chest abdomen or pelvis.  RESPIRATORY: normal effort and no respiratory distress.  Decreased breath sounds in the bases bilaterally.  ABDOMEN: soft and nontender.  MUSCULOSKELETAL: To his knees anteriorly he is got mild superficial abrasion.  There is mild isolated areas of redness where you feel that he is probably been laying on that area for prolonged period of time and has some mild pressure sores.  He has intact distal pulses.  NEURO: alert and oriented to name.  Disoriented to his age and the year.  He will follow commands.  Cranial nerves II through XII are grossly intact he has no drift to his upper extremities or lower extremities bilaterally.  He has some mild slurred speech but this appears normal for him per history with EMS and from what he is stating.  He has generalized weakness diffusely.  He has no focal motor or sensory changes.  SKIN: warm, dry    Vital signs and nursing notes reviewed.        LAB RESULTS  Recent Results (from the past 24 hour(s))   Comprehensive Metabolic Panel    Collection Time: 11/08/23  8:22 AM    Specimen: Blood   Result Value Ref Range    Glucose 121 (H) 65 - 99 mg/dL    BUN 28 (H) 8 - 23 mg/dL    Creatinine 1.22 0.76 - 1.27 mg/dL    Sodium 141 136 - 145 mmol/L    Potassium 4.2 3.5 - 5.2 mmol/L    Chloride 106 98 - 107 mmol/L    CO2 25.0 22.0 - 29.0 mmol/L    Calcium 9.7 8.6 - 10.5 mg/dL    Total Protein 7.1 6.0 - 8.5 g/dL    Albumin 4.3 3.5 - 5.2 g/dL    ALT (SGPT) 22 1 - 41 U/L    AST (SGOT) 50 (H) 1 - 40 U/L    Alkaline Phosphatase 73 39 - 117 U/L    Total Bilirubin 0.7 0.0 - 1.2 mg/dL    Globulin 2.8 gm/dL    A/G Ratio 1.5 g/dL    BUN/Creatinine Ratio 23.0 7.0 - 25.0    Anion Gap 10.0 5.0 - 15.0 mmol/L    eGFR 59.9 (L) >60.0 mL/min/1.73   Protime-INR    Collection Time: 11/08/23  8:22 AM     Specimen: Blood   Result Value Ref Range    Protime 13.8 11.7 - 14.2 Seconds    INR 1.05 0.90 - 1.10   High Sensitivity Troponin T    Collection Time: 11/08/23  8:22 AM    Specimen: Blood   Result Value Ref Range    HS Troponin T 22 (H) <22 ng/L   Magnesium    Collection Time: 11/08/23  8:22 AM    Specimen: Blood   Result Value Ref Range    Magnesium 2.0 1.6 - 2.4 mg/dL   CK    Collection Time: 11/08/23  8:22 AM    Specimen: Blood   Result Value Ref Range    Creatine Kinase 2,682 (H) 20 - 200 U/L   Ethanol    Collection Time: 11/08/23  8:22 AM    Specimen: Blood   Result Value Ref Range    Ethanol <10 0 - 10 mg/dL    Ethanol % <0.010 %   TSH    Collection Time: 11/08/23  8:22 AM    Specimen: Blood   Result Value Ref Range    TSH 1.350 0.270 - 4.200 uIU/mL   CBC Auto Differential    Collection Time: 11/08/23  8:22 AM    Specimen: Blood   Result Value Ref Range    WBC 7.97 3.40 - 10.80 10*3/mm3    RBC 4.29 4.14 - 5.80 10*6/mm3    Hemoglobin 13.9 13.0 - 17.7 g/dL    Hematocrit 40.4 37.5 - 51.0 %    MCV 94.2 79.0 - 97.0 fL    MCH 32.4 26.6 - 33.0 pg    MCHC 34.4 31.5 - 35.7 g/dL    RDW 13.2 12.3 - 15.4 %    RDW-SD 45.4 37.0 - 54.0 fl    MPV 10.0 6.0 - 12.0 fL    Platelets 210 140 - 450 10*3/mm3    Neutrophil % 81.2 (H) 42.7 - 76.0 %    Lymphocyte % 7.4 (L) 19.6 - 45.3 %    Monocyte % 11.0 5.0 - 12.0 %    Eosinophil % 0.0 (L) 0.3 - 6.2 %    Basophil % 0.1 0.0 - 1.5 %    Immature Grans % 0.3 0.0 - 0.5 %    Neutrophils, Absolute 6.47 1.70 - 7.00 10*3/mm3    Lymphocytes, Absolute 0.59 (L) 0.70 - 3.10 10*3/mm3    Monocytes, Absolute 0.88 0.10 - 0.90 10*3/mm3    Eosinophils, Absolute 0.00 0.00 - 0.40 10*3/mm3    Basophils, Absolute 0.01 0.00 - 0.20 10*3/mm3    Immature Grans, Absolute 0.02 0.00 - 0.05 10*3/mm3    nRBC 0.0 0.0 - 0.2 /100 WBC   D-dimer, Quantitative    Collection Time: 11/08/23  8:22 AM    Specimen: Blood   Result Value Ref Range    D-Dimer, Quantitative 1.64 (H) 0.00 - 0.80 MCGFEU/mL   ECG 12 Lead Other; Down  on the floor and weakness    Collection Time: 11/08/23  8:24 AM   Result Value Ref Range    QT Interval 410 ms    QTC Interval 449 ms   Urinalysis With Microscopic If Indicated (No Culture) - Urine, Clean Catch    Collection Time: 11/08/23  8:59 AM    Specimen: Urine, Clean Catch   Result Value Ref Range    Color, UA Dark Yellow (A) Yellow, Straw    Appearance, UA Cloudy (A) Clear    pH, UA 5.5 5.0 - 8.0    Specific Gravity, UA >=1.030 1.005 - 1.030    Glucose, UA Negative Negative    Ketones, UA Trace (A) Negative    Bilirubin, UA Negative Negative    Blood, UA Trace (A) Negative    Protein, UA 30 mg/dL (1+) (A) Negative    Leuk Esterase, UA Negative Negative    Nitrite, UA Negative Negative    Urobilinogen, UA 1.0 E.U./dL 0.2 - 1.0 E.U./dL   Urine Drug Screen - Urine, Clean Catch    Collection Time: 11/08/23  8:59 AM    Specimen: Urine, Clean Catch   Result Value Ref Range    Amphet/Methamphet, Screen Negative Negative    Barbiturates Screen, Urine Negative Negative    Benzodiazepine Screen, Urine Negative Negative    Cocaine Screen, Urine Negative Negative    Opiate Screen Negative Negative    THC, Screen, Urine Negative Negative    Methadone Screen, Urine Negative Negative    Oxycodone Screen, Urine Negative Negative    Fentanyl, Urine Negative Negative   Urinalysis, Microscopic Only - Urine, Clean Catch    Collection Time: 11/08/23  8:59 AM    Specimen: Urine, Clean Catch   Result Value Ref Range    RBC, UA 6-10 (A) None Seen, 0-2 /HPF    WBC, UA 0-2 None Seen, 0-2 /HPF    Bacteria, UA None Seen None Seen /HPF    Squamous Epithelial Cells, UA 0-2 None Seen, 0-2 /HPF    Hyaline Casts, UA 7-12 None Seen /LPF    Methodology Automated Microscopy    Respiratory Panel PCR w/COVID-19(SARS-CoV-2) [facility] In-House, NP Swab in UTM/VTM, 3-4 HR TAT - Swab, Nasopharynx    Collection Time: 11/08/23  9:00 AM    Specimen: Nasopharynx; Swab   Result Value Ref Range    ADENOVIRUS, PCR Not Detected Not  Detected    Coronavirus 229E Not Detected Not Detected    Coronavirus HKU1 Not Detected Not Detected    Coronavirus NL63 Not Detected Not Detected    Coronavirus OC43 Not Detected Not Detected    COVID19 Detected (C) Not Detected - Ref. Range    Human Metapneumovirus Not Detected Not Detected    Human Rhinovirus/Enterovirus Not Detected Not Detected    Influenza A PCR Not Detected Not Detected    Influenza B PCR Not Detected Not Detected    Parainfluenza Virus 1 Not Detected Not Detected    Parainfluenza Virus 2 Not Detected Not Detected    Parainfluenza Virus 3 Not Detected Not Detected    Parainfluenza Virus 4 Not Detected Not Detected    RSV, PCR Not Detected Not Detected    Bordetella pertussis pcr Not Detected Not Detected    Bordetella parapertussis PCR Not Detected Not Detected    Chlamydophila pneumoniae PCR Not Detected Not Detected    Mycoplasma pneumo by PCR Not Detected Not Detected   High Sensitivity Troponin T 2Hr    Collection Time: 11/08/23 10:43 AM    Specimen: Blood   Result Value Ref Range    HS Troponin T 26 (H) <22 ng/L    Troponin T Delta 4 (C) >=-4 - <+4 ng/L   C-reactive Protein    Collection Time: 11/08/23 10:43 AM    Specimen: Blood   Result Value Ref Range    C-Reactive Protein 6.24 (H) 0.00 - 0.50 mg/dL   Ferritin    Collection Time: 11/08/23 10:43 AM    Specimen: Blood   Result Value Ref Range    Ferritin 377.00 30.00 - 400.00 ng/mL   Procalcitonin    Collection Time: 11/08/23 10:43 AM    Specimen: Blood   Result Value Ref Range    Procalcitonin 0.11 0.00 - 0.25 ng/mL       Ordered the above labs and independently reviewed the results.        RADIOLOGY  CT Head Without Contrast    Result Date: 11/8/2023  CT HEAD WITHOUT CONTRAST  HISTORY: Dizziness, found on floor.  COMPARISON: CT head 08/24/2022.  FINDINGS: There is no evidence of fracture or of intracranial hemorrhage. A scalp hematoma is noted in the frontal region anteriorly. The brain and ventricles are symmetrical. There is no  evidence of acute infarction, hydrocephalus or midline shift. A more nodular area of soft tissue is appreciated overlying the frontal bone to the left of midline superiorly measuring 6 mm in size. This is new versus the CT examination of 08/24/2022.      A scalp hematoma is noted in the frontal region. There is no evidence of fracture or of intracranial hemorrhage. Further evaluation could be performed with a MRI examination of the brain as indicated. There is a 6 mm nodule involving the scalp overlying the frontal bone to the left superiorly which is new versus the prior examination. This may be sequela from trauma or potentially a new nodule/sebaceous cyst. A follow-up CT examination of the head is recommended. If this nodule persists, it can be further characterized with a MRI examination of the brain with and without contrast.  Radiation dose reduction techniques were utilized, including automated exposure control and exposure modulation based on body size.       XR Chest 1 View    Result Date: 11/8/2023  XR CHEST 1 VW-11/8/2023  HISTORY: Weakness. Patient down with decreased responsiveness.  Heart size is within normal limits. The lungs appear free of acute infiltrates. There is deformity of the left mid clavicle from an old left mid clavicle fracture. Bones and soft tissues are otherwise unremarkable.  There is slight blunting of the left costophrenic sulcus.      1. Slight blunting of the left costophrenic sulcus which may be related to a small left pleural effusion. 2. No other significant findings are noted.   This report was finalized on 11/8/2023 8:52 AM by Dr. Justin Hirsch M.D on Workstation: [id number]       I ordered the above noted radiological studies. Reviewed by me and discussed with radiologist.  See dictation for official radiology interpretation.      PROCEDURES    Procedures      MEDICATIONS GIVEN IN ER    Medications   sodium chloride 0.9 % flush 10 mL (has no administration in time range)    sodium chloride 0.9 % flush 10 mL (10 mL Intravenous Given 11/8/23 1410)   sodium chloride 0.9 % flush 10 mL (has no administration in time range)   sodium chloride 0.9 % infusion 40 mL (has no administration in time range)   sennosides-docusate (PERICOLACE) 8.6-50 MG per tablet 2 tablet (has no administration in time range)     And   polyethylene glycol (MIRALAX) packet 17 g (has no administration in time range)     And   bisacodyl (DULCOLAX) EC tablet 5 mg (has no administration in time range)     And   bisacodyl (DULCOLAX) suppository 10 mg (has no administration in time range)   nitroglycerin (NITROSTAT) SL tablet 0.4 mg (has no administration in time range)   Nirmatrelvir&Ritonavir 300/100 (PAXLOVID) 20 x 150 MG & 10 x 100MG tablet therapy pack 3 tablet (has no administration in time range)   lactated ringers infusion (125 mL/hr Intravenous New Bag 11/8/23 1410)   hydrocortisone-bacitracin-zinc oxide-nystatin (MAGIC BARRIER) ointment 1 application  (has no administration in time range)   sodium chloride 0.9 % bolus 500 mL (0 mL Intravenous Stopped 11/8/23 0857)         All labs have been independently reviewed by me.  All radiology studies have been reviewed by me and I discussed with radiologist dictating the report when indicated below.  All EKG's independently viewed and interpreted by me.  Discussion below represents my analysis of pertinent findings related to patient's condition, differential diagnosis, treatment plan and final disposition.        PROGRESS, DATA ANALYSIS, CONSULTS, AND MEDICAL DECISION MAKING    This is a gentleman who was on the floor for prolonged period of time and has the appearance of it as if he is laying on the right side of his body.  He does have some mild slurred speech which according to EMS is normal for him.  He is understandable.  We will check lab work as well as a CT scan of his head and EKG.  This gentleman will need to be admitted to the hospital.  We will start IV  fluids on him.  All questions answered at this time  .  This gentleman is not a tPA candidate.  He is not also an interventional candidate.  He has no focal deficit other than some mild slurred speech which is likely chronic per EMS and per his history.    ED Course as of 11/08/23 1650   Wed Nov 08, 2023   0826 Patient's significant other just called the  and communicated that she is not able to come into the emergency department.  She states that she has been recently diagnosed with COVID. [MM]   0831 My own independent TURP rotation of the EKG that was done at 8:24 AM reveals a rate of 72 it is sinus rhythm there is some interventional conduction delay consistent with a right bundle branch block there is also signs of some LVH  He has Q waves in anterior septal leads  I do not see any definitive acute injury pattern  QT looks normal  I compared to an EKG that was done on 9/19/2019 and this looks fairly similar. [MM]   1035 COVID19(!!): Detected [MM]   1035 Creatine Kinase(!): 2,682  Likely from prolonged downtime. [MM]   1035 HS Troponin T(!): 22  I do not see any definitive acute injury pattern on EKG.  Patient reports no chest pain.  Likely related to his comorbidities.  We will continue to trend and follow. [MM]   1036  spikes I believe it is likely from I reviewed the CT scan report from the radiologist.  Radiologist mentions a scalp hematoma.  I anticipate that his edema from him laying on his head all night.  There is no fracture or intracranial hemorrhage.  Please see complete dictated report from radiologist. [MM]   1037 I reviewed the chest x-ray.  There is no obvious focal pulmonary consider consolidation no pneumothorax.  I did review the radiologist report.  There is mention of potentially very small left pleural effusion.  Please see complete dictated report from radiologist [MM]   1037 On my reevaluation of the patient this patient status is remained unchanged.  Hemodynamically stable.  No  family or friend present in the emergency department.  All questions answered [MM]   1208 I did discuss the case with Dr. Linn who is on for A.  Informed him of the patient's presenting symptoms and results of tests.  He agrees to admit the patient to the hospital.  I admit the patient to a monitored bed. [MM]      ED Course User Index  [MM] Justin Reed MD       AS OF 16:50 EST VITALS:    BP - 160/81  HR - 58  TEMP - 97.3 °F (36.3 °C) (Axillary)  02 SATS - 100%    SOCIAL DETERMINANTS OF HEALTH THAT IMPACT OR LIMIT CARE (For example..Homelessness,safe discharge, inability to obtain care, follow up, or prescriptions):        DIAGNOSIS  Final diagnoses:   Altered mental status, unspecified altered mental status type   Non-traumatic rhabdomyolysis   Troponin level elevated   COVID-19 virus infection         DISPOSITION  I have reviewed the test results with my patient and explained the current treatment plan.  I answered all of the patient's questions.  The patient will be admitted to monitor bed at this time.  The patient is not hypotensive and is tolerating their current disease condition well enough for a monitored bed at this time.  The patient's current condition does not require intensive care treatment at this time.            DICTATED UTILIZING DRAGON DICTATION    Note Disclaimer: At [facility], we believe that sharing information builds trust and better relationships. You are receiving this note because you recently visited [facility]. It is possible you will see health information before a provider has talked with you about it. This kind of information can be easy to misunderstand. To help you fully understand what it means for your health, we urge you to discuss this note with your provider.       Justin Reed MD  11/08/23 9690

## 2023-11-08 NOTE — ED NOTES
Pt states that he has been weak since Sunday. Reports that he had an incontinence episode in the bed last night and that his girlfriend laid him in the floor to clean it up. Reports that he could not get back up and so she left him there through out the night. Pt denies normal incontinence. Pt has redness and swelling to his face.

## 2023-11-08 NOTE — CASE MANAGEMENT/SOCIAL WORK
Discharge Planning Assessment  [facility]     Patient Name: Jake Kan  MRN: [id number]  Today's Date: 11/8/2023    Admit Date: 11/8/2023    Plan: Plan home with friend.  KARRI Buckley RN   Discharge Needs Assessment       Row Name 11/08/23 1402       Living Environment    People in Home friend(s)    Name(s) of People in Home Friend  ( Kary Charles [phone])    Current Living Arrangements home    Potentially Unsafe Housing Conditions none    Primary Care Provided by self    Provides Primary Care For no one, unable/limited ability to care for self    Family Caregiver if Needed friend(s)    Quality of Family Relationships helpful;involved;supportive    Able to Return to Prior Arrangements yes    Living Arrangement Comments Pt lives with his friend  (Kary Charles [phone]) in a single story house.       Resource/Environmental Concerns    Resource/Environmental Concerns none    Transportation Concerns none       Transition Planning    Patient/Family Anticipates Transition to home    Patient/Family Anticipated Services at Transition none    Transportation Anticipated family or friend will provide       Discharge Needs Assessment    Readmission Within the Last 30 Days no previous admission in last 30 days    Equipment Currently Used at Home grab bar;walker, rolling;wheelchair    Concerns to be Addressed no discharge needs identified;denies needs/concerns at this time    Anticipated Changes Related to Illness none    Equipment Needed After Discharge grab bar, tub/shower;walker, rolling;wheelchair, manual                   Discharge Plan       Row Name 11/08/23 1406       Plan    Plan Plan home with friend.  KARRI Buckley RN    Patient/Family in Agreement with Plan yes    Plan Comments FACE SHEET VERIFIED/ IM LETTER SIGNED.  Pt is in ISOLATION FOR [facility]. Called and spoke with pt 's friend ( Kary Charles).  Pt's PCP is Dr. Sree Thomas.   Pt lives with his friend ( Kary Charles [phone]) in a  single story house.  Pt is independent with ADLs.  Pt has a grab bar, rolling walker and wheelchair for home use.  Pt gets his prescriptions at [facility]).  Pt does not have issues affording medications.  Pt is not current with .  Pt has been in [facility] for skilled care. CCP will follow for discharge needs.  Plan home with friend.   KARRI Buckley RN                  Continued Care and Services - Admitted Since 11/8/2023    Coordination has not been started for this encounter.       Expected Discharge Date and Time       Expected Discharge Date Expected Discharge Time    Nov 13, 2023            Demographic Summary       Row Name 11/08/23 1357       General Information    Admission Type inpatient    Arrived From emergency department    Required Notices Provided Important Message from Medicare    Referral Source admission list    Reason for Consult discharge planning    Preferred Language English                   Functional Status       Row Name 11/08/23 1401       Functional Status    Usual Activity Tolerance moderate    Current Activity Tolerance fair       Functional Status, IADL    Medications independent    Meal Preparation assistive person    Housekeeping assistive person    Laundry assistive person    Shopping assistive person       Mental Status    General Appearance WDL WDL                   Psychosocial    No documentation.                  Abuse/Neglect    No documentation.                  Legal    No documentation.                  Substance Abuse    No documentation.                  Patient Forms    No documentation.                     Mary Jo Buckley, RN

## 2023-11-08 NOTE — CASE MANAGEMENT/SOCIAL WORK
Continued Stay Note  [facility]     Patient Name: Jake Kan  MRN: [id number]  Today's Date: 11/8/2023    Admit Date: 11/8/2023    Plan: Plan home with friend.  KARRI Buckley RN   Discharge Plan       Row Name 11/08/23 1406       Plan    Plan Plan home with friend.  KARRI Buckley RN    Patient/Family in Agreement with Plan yes    Plan Comments FACE SHEET VERIFIED/ IM LETTER SIGNED.  Pt is in ISOLATION FOR [facility]. Called and spoke with pt 's friend ( Kary Charles).  Pt's PCP is Dr. Sree Thomas.   Pt lives with his friend ( Kary Charles [phone]) in a single story house.  Pt is independent with ADLs.  Pt has a grab bar, rolling walker and wheelchair for home use.  Pt gets his prescriptions at [facility]).  Pt does not have issues affording medications.  Pt is not current with .  Pt has been in [facility] for skilled care. CCP will follow for discharge needs.  Plan home with friend.   KARRI Buckley RN                   Discharge Codes    No documentation.                 Expected Discharge Date and Time       Expected Discharge Date Expected Discharge Time    Nov 13, 2023               Mary Jo Buckley RN

## 2023-11-08 NOTE — ED TRIAGE NOTES
Patient to ED via EMS from home. Patient's wife called EMS because patient has been lying prone on the ground over night. EMS stated that the wife told them the patient urinated in the bed last night, and while she was changing the sheets, she had the patient lie on the floor. When the wife tried to help the patient back into bed, she was unable to get him up because he was too weak.

## 2023-11-08 NOTE — ED NOTES
Nursing report ED to floor  Jake SANTOS Kan  78 y.o.  male    HPI :   Chief Complaint   Patient presents with    Weakness - Generalized       Admitting doctor:   Frandy Linn MD    Admitting diagnosis:   The primary encounter diagnosis was Altered mental status, unspecified altered mental status type. Diagnoses of Non-traumatic rhabdomyolysis, Troponin level elevated, and COVID-19 virus infection were also pertinent to this visit.    Code status:   Current Code Status       Date Active Code Status Order ID Comments User Context       Prior            Allergies:   Patient has no known allergies.    Isolation:   Enhanced Droplet/Contact     Intake and Output    Intake/Output Summary (Last 24 hours) at 11/8/2023 1210  Last data filed at 11/8/2023 0857  Gross per 24 hour   Intake 500 ml   Output --   Net 500 ml       Weight:       11/08/23  0806   Weight: 80.3 kg (177 lb)       Most recent vitals:   Vitals:    11/08/23 0901 11/08/23 1004 11/08/23 1114 11/08/23 1201   BP: 167/84 145/72 161/86 148/76   Patient Position: Lying Lying Lying Lying   Pulse: 77 65 71 72   Resp: 16 16 16 16   Temp:       SpO2: 100% 97% 94% 99%   Weight:       Height:           Active LDAs/IV Access:   Lines, Drains & Airways       Active LDAs       Name Placement date Placement time Site Days    Peripheral IV 11/08/23 0801 Left;Posterior Hand 11/08/23  0801  Hand  less than 1    Peripheral IV 11/08/23 0824 Right Antecubital 11/08/23  0824  Antecubital  less than 1                    Labs (abnormal labs have a star):   Labs Reviewed   RESPIRATORY PANEL PCR W/ COVID-19 (SARS-COV-2), NP SWAB IN UTM/VTP, 3-4 HR TAT - Abnormal; Notable for the following components:       Result Value    COVID19 Detected (*)     All other components within normal limits    Narrative:     In the setting of a positive respiratory panel with a viral infection PLUS a negative procalcitonin without other underlying concern for bacterial infection, consider observing off  antibiotics or discontinuation of antibiotics and continue supportive care. If the respiratory panel is positive for atypical bacterial infection (Bordetella pertussis, Chlamydophila pneumoniae, or Mycoplasma pneumoniae), consider antibiotic de-escalation to target atypical bacterial infection.   COMPREHENSIVE METABOLIC PANEL - Abnormal; Notable for the following components:    Glucose 121 (*)     BUN 28 (*)     AST (SGOT) 50 (*)     eGFR 59.9 (*)     All other components within normal limits    Narrative:     GFR Normal >60  Chronic Kidney Disease <60  Kidney Failure <15    The GFR formula is only valid for adults with stable renal function between ages 18 and 70.   URINALYSIS W/ MICROSCOPIC IF INDICATED (NO CULTURE) - Abnormal; Notable for the following components:    Color, UA Dark Yellow (*)     Appearance, UA Cloudy (*)     Ketones, UA Trace (*)     Blood, UA Trace (*)     Protein, UA 30 mg/dL (1+) (*)     All other components within normal limits   TROPONIN - Abnormal; Notable for the following components:    HS Troponin T 22 (*)     All other components within normal limits    Narrative:     High Sensitive Troponin T Reference Range:  <14.0 ng/L- Negative Female for AMI  <22.0 ng/L- Negative Male for AMI  >=14 - Abnormal Female indicating possible myocardial injury.  >=22 - Abnormal Male indicating possible myocardial injury.   Clinicians would have to utilize clinical acumen, EKG, Troponin, and serial changes to determine if it is an Acute Myocardial Infarction or myocardial injury due to an underlying chronic condition.        CK - Abnormal; Notable for the following components:    Creatine Kinase 2,682 (*)     All other components within normal limits   CBC WITH AUTO DIFFERENTIAL - Abnormal; Notable for the following components:    Neutrophil % 81.2 (*)     Lymphocyte % 7.4 (*)     Eosinophil % 0.0 (*)     Lymphocytes, Absolute 0.59 (*)     All other components within normal limits   HIGH SENSITIVITIY  TROPONIN T 2HR - Abnormal; Notable for the following components:    HS Troponin T 26 (*)     Troponin T Delta 4 (*)     All other components within normal limits    Narrative:     High Sensitive Troponin T Reference Range:  <14.0 ng/L- Negative Female for AMI  <22.0 ng/L- Negative Male for AMI  >=14 - Abnormal Female indicating possible myocardial injury.  >=22 - Abnormal Male indicating possible myocardial injury.   Clinicians would have to utilize clinical acumen, EKG, Troponin, and serial changes to determine if it is an Acute Myocardial Infarction or myocardial injury due to an underlying chronic condition.        URINALYSIS, MICROSCOPIC ONLY - Abnormal; Notable for the following components:    RBC, UA 6-10 (*)     All other components within normal limits   PROTIME-INR - Normal   MAGNESIUM - Normal   URINE DRUG SCREEN - Normal    Narrative:     Negative Thresholds Per Drugs Screened:    Amphetamines                 500 ng/ml  Barbiturates                 200 ng/ml  Benzodiazepines              100 ng/ml  Cocaine                      300 ng/ml  Methadone                    300 ng/ml  Opiates                      300 ng/ml  Oxycodone                    100 ng/ml  THC                           50 ng/ml  Fentanyl                       5 ng/ml      The Normal Value for all drugs tested is negative. This report includes final unconfirmed screening results to be used for medical treatment purposes only. Unconfirmed results must not be used for non-medical purposes such as employment or legal testing. Clinical consideration should be applied to any drug of abuse test, particularly when unconfirmed results are used.           TSH - Normal   ETHANOL   POCT GLUCOSE FINGERSTICK   CBC AND DIFFERENTIAL    Narrative:     The following orders were created for panel order CBC & Differential.  Procedure                               Abnormality         Status                     ---------                                -----------         ------                     CBC Auto Differential[[id number]]        Abnormal            Final result                 Please view results for these tests on the individual orders.       EKG:   ECG 12 Lead Other; Down on the floor and weakness   Preliminary Result   HEART RATE= 72  bpm   RR Interval= 833  ms   GA Interval= 167  ms   P Horizontal Axis= -3  deg   P Front Axis= 65  deg   QRSD Interval= 134  ms   QT Interval= 410  ms   QTcB= 449  ms   QRS Axis= -51  deg   T Wave Axis= 9  deg   - ABNORMAL ECG -   Sinus rhythm   RBBB and LAFB   Probable left ventricular hypertrophy   Anterior Q waves, possibly due to LVH   Electronically Signed By:    Date and Time of Study: 2023-11-08 08:24:37          Meds given in ED:   Medications   sodium chloride 0.9 % flush 10 mL (has no administration in time range)   sodium chloride 0.9 % infusion (125 mL/hr Intravenous New Bag 11/8/23 0859)   sodium chloride 0.9 % bolus 500 mL (0 mL Intravenous Stopped 11/8/23 0857)       Imaging results:  CT Head Without Contrast    Result Date: 11/8/2023  A scalp hematoma is noted in the frontal region. There is no evidence of fracture or of intracranial hemorrhage. Further evaluation could be performed with a MRI examination of the brain as indicated. There is a 6 mm nodule involving the scalp overlying the frontal bone to the left superiorly which is new versus the prior examination. This may be sequela from trauma or potentially a new nodule/sebaceous cyst. A follow-up CT examination of the head is recommended. If this nodule persists, it can be further characterized with a MRI examination of the brain with and without contrast.  Radiation dose reduction techniques were utilized, including automated exposure control and exposure modulation based on body size.       XR Chest 1 View    Result Date: 11/8/2023  1. Slight blunting of the left costophrenic sulcus which may be related to a small left pleural effusion. 2. No other  significant findings are noted.   This report was finalized on 11/8/2023 8:52 AM by Dr. Justin Hirsch M.D on Workstation: [id number]       Ambulatory status:   - bedrest     Social issues:   Social History     Socioeconomic History    Marital status: Single    Number of children: 1   Tobacco Use    Smoking status: Never    Smokeless tobacco: Never   Vaping Use    Vaping Use: Never used   Substance and Sexual Activity    Alcohol use: No    Drug use: No    Sexual activity: Not Currently     Partners: Female       NIH Stroke Scale:       Chela Crisostomo RN  11/08/23 12:10 EST

## 2023-11-08 NOTE — PLAN OF CARE
Goal Outcome Evaluation:  Plan of Care Reviewed With: patient           Outcome Evaluation: Pt has Disoriented x4.  Pt has a L. knee laceration.  Scrotal area was cleaned and had clumps of hair and yeast surrounded.  Remnants of a BM present in brief. Coccyx has a line of excoriation in between buttocks.  Wound Care to be consulted for care.  Pt will answer questions but does not make sense to what is known adn then falls asleep.  Pt said that he lives by himself.  Will start IVF as ordered.  SHAHIDAS. LEONARD. SB on the monitor.

## 2023-11-09 LAB
ALBUMIN SERPL-MCNC: 3.3 G/DL (ref 3.5–5.2)
ALBUMIN/GLOB SERPL: 1.3 G/DL
ALP SERPL-CCNC: 58 U/L (ref 39–117)
ALT SERPL W P-5'-P-CCNC: 15 U/L (ref 1–41)
ANION GAP SERPL CALCULATED.3IONS-SCNC: 6.9 MMOL/L (ref 5–15)
AST SERPL-CCNC: 38 U/L (ref 1–40)
BASOPHILS # BLD AUTO: 0.02 10*3/MM3 (ref 0–0.2)
BASOPHILS NFR BLD AUTO: 0.3 % (ref 0–1.5)
BILIRUB SERPL-MCNC: 0.7 MG/DL (ref 0–1.2)
BUN SERPL-MCNC: 27 MG/DL (ref 8–23)
BUN/CREAT SERPL: 22.5 (ref 7–25)
CALCIUM SPEC-SCNC: 8.8 MG/DL (ref 8.6–10.5)
CHLORIDE SERPL-SCNC: 109 MMOL/L (ref 98–107)
CK SERPL-CCNC: 1515 U/L (ref 20–200)
CO2 SERPL-SCNC: 23.1 MMOL/L (ref 22–29)
CREAT SERPL-MCNC: 1.2 MG/DL (ref 0.76–1.27)
CRP SERPL-MCNC: 7.52 MG/DL (ref 0–0.5)
DEPRECATED RDW RBC AUTO: 45.3 FL (ref 37–54)
EGFRCR SERPLBLD CKD-EPI 2021: 61.1 ML/MIN/1.73
EOSINOPHIL # BLD AUTO: 0.04 10*3/MM3 (ref 0–0.4)
EOSINOPHIL NFR BLD AUTO: 0.7 % (ref 0.3–6.2)
ERYTHROCYTE [DISTWIDTH] IN BLOOD BY AUTOMATED COUNT: 13.2 % (ref 12.3–15.4)
FERRITIN SERPL-MCNC: 397 NG/ML (ref 30–400)
GLOBULIN UR ELPH-MCNC: 2.6 GM/DL
GLUCOSE SERPL-MCNC: 96 MG/DL (ref 65–99)
HCT VFR BLD AUTO: 36 % (ref 37.5–51)
HGB BLD-MCNC: 12.1 G/DL (ref 13–17.7)
IMM GRANULOCYTES # BLD AUTO: 0.02 10*3/MM3 (ref 0–0.05)
IMM GRANULOCYTES NFR BLD AUTO: 0.3 % (ref 0–0.5)
LYMPHOCYTES # BLD AUTO: 1.11 10*3/MM3 (ref 0.7–3.1)
LYMPHOCYTES NFR BLD AUTO: 19.1 % (ref 19.6–45.3)
MCH RBC QN AUTO: 31.7 PG (ref 26.6–33)
MCHC RBC AUTO-ENTMCNC: 33.6 G/DL (ref 31.5–35.7)
MCV RBC AUTO: 94.2 FL (ref 79–97)
MONOCYTES # BLD AUTO: 0.61 10*3/MM3 (ref 0.1–0.9)
MONOCYTES NFR BLD AUTO: 10.5 % (ref 5–12)
NEUTROPHILS NFR BLD AUTO: 4.01 10*3/MM3 (ref 1.7–7)
NEUTROPHILS NFR BLD AUTO: 69.1 % (ref 42.7–76)
NRBC BLD AUTO-RTO: 0 /100 WBC (ref 0–0.2)
PLATELET # BLD AUTO: 190 10*3/MM3 (ref 140–450)
PMV BLD AUTO: 10 FL (ref 6–12)
POTASSIUM SERPL-SCNC: 4 MMOL/L (ref 3.5–5.2)
PROT SERPL-MCNC: 5.9 G/DL (ref 6–8.5)
RBC # BLD AUTO: 3.82 10*6/MM3 (ref 4.14–5.8)
SODIUM SERPL-SCNC: 139 MMOL/L (ref 136–145)
WBC NRBC COR # BLD: 5.81 10*3/MM3 (ref 3.4–10.8)

## 2023-11-09 PROCEDURE — 97110 THERAPEUTIC EXERCISES: CPT

## 2023-11-09 PROCEDURE — 25010000002 ENOXAPARIN PER 10 MG: Performed by: INTERNAL MEDICINE

## 2023-11-09 PROCEDURE — 86140 C-REACTIVE PROTEIN: CPT | Performed by: INTERNAL MEDICINE

## 2023-11-09 PROCEDURE — 97162 PT EVAL MOD COMPLEX 30 MIN: CPT

## 2023-11-09 PROCEDURE — 97166 OT EVAL MOD COMPLEX 45 MIN: CPT

## 2023-11-09 PROCEDURE — 82550 ASSAY OF CK (CPK): CPT | Performed by: INTERNAL MEDICINE

## 2023-11-09 PROCEDURE — 85025 COMPLETE CBC W/AUTO DIFF WBC: CPT | Performed by: INTERNAL MEDICINE

## 2023-11-09 PROCEDURE — 25810000003 LACTATED RINGERS PER 1000 ML: Performed by: INTERNAL MEDICINE

## 2023-11-09 PROCEDURE — 97535 SELF CARE MNGMENT TRAINING: CPT

## 2023-11-09 PROCEDURE — 82728 ASSAY OF FERRITIN: CPT | Performed by: INTERNAL MEDICINE

## 2023-11-09 PROCEDURE — 92610 EVALUATE SWALLOWING FUNCTION: CPT

## 2023-11-09 PROCEDURE — 80053 COMPREHEN METABOLIC PANEL: CPT | Performed by: INTERNAL MEDICINE

## 2023-11-09 RX ORDER — ENOXAPARIN SODIUM 100 MG/ML
40 INJECTION SUBCUTANEOUS EVERY 24 HOURS
Status: DISCONTINUED | OUTPATIENT
Start: 2023-11-09 | End: 2023-11-21 | Stop reason: HOSPADM

## 2023-11-09 RX ADMIN — Medication 10 ML: at 20:17

## 2023-11-09 RX ADMIN — ENOXAPARIN SODIUM 40 MG: 100 INJECTION SUBCUTANEOUS at 13:28

## 2023-11-09 RX ADMIN — SODIUM CHLORIDE, POTASSIUM CHLORIDE, SODIUM LACTATE AND CALCIUM CHLORIDE 125 ML/HR: 600; 310; 30; 20 INJECTION, SOLUTION INTRAVENOUS at 18:08

## 2023-11-09 RX ADMIN — Medication 10 ML: at 09:34

## 2023-11-09 RX ADMIN — NIRMATRELVIR AND RITONAVIR 3 TABLET: KIT at 09:33

## 2023-11-09 RX ADMIN — NIRMATRELVIR AND RITONAVIR 3 TABLET: KIT at 20:17

## 2023-11-09 RX ADMIN — ZINC OXIDE 1 APPLICATION: 200 OINTMENT TOPICAL at 20:17

## 2023-11-09 RX ADMIN — SODIUM CHLORIDE, POTASSIUM CHLORIDE, SODIUM LACTATE AND CALCIUM CHLORIDE 125 ML/HR: 600; 310; 30; 20 INJECTION, SOLUTION INTRAVENOUS at 00:09

## 2023-11-09 RX ADMIN — SENNOSIDES AND DOCUSATE SODIUM 2 TABLET: 50; 8.6 TABLET ORAL at 09:33

## 2023-11-09 RX ADMIN — ZINC OXIDE 1 APPLICATION: 200 OINTMENT TOPICAL at 09:34

## 2023-11-09 RX ADMIN — SENNOSIDES AND DOCUSATE SODIUM 2 TABLET: 50; 8.6 TABLET ORAL at 20:17

## 2023-11-09 NOTE — PLAN OF CARE
Goal Outcome Evaluation:              Outcome Evaluation: Patient seen for clinical swallow assessment. No family present at bedside. Per RN, patient had inconsistent wet vocal quality with thin liquids. Requested speech eval to determine safest diet. Patient agreeable to p.o. trials. Vocal quality hoarse prior assessment. He tolerated ice chips and thin via spoon with no overt s/sx of aspiration or penetration. Wet vocal quality x2 with thin by cup and throat clear with thin by straw. No overt s/sx of aspiration noted with nectar thick liquids or puree solids. Mastication slightly prolonged, but functional for soft and regular solids. Slow oral transit with regular cracker. Utilized liquid wash to clear oral residue.     At this time, SLP recommends soft, chopped solids (no mixed) with nectar thick liquids. May have ice or water sparingly in between meals after oral care. Medication whole with thickened liquids or applesauce. Precautions: slow rate, small bites, upright for meals. ST to follow for diet tolerance/re-evaluation.

## 2023-11-09 NOTE — THERAPY EVALUATION
Patient Name: Jake Kan  : 1945    MRN: [id number]                              Today's Date: 2023       Admit Date: 2023    Visit Dx:     ICD-10-CM ICD-9-CM   1. Altered mental status, unspecified altered mental status type  R41.82 780.97   2. Non-traumatic rhabdomyolysis  M62.82 728.88   3. Troponin level elevated  R79.89 790.6   4. COVID-19 virus infection  U07.1 079.89     Patient Active Problem List   Diagnosis    Sciatica of right side    Gastroesophageal reflux disease without esophagitis    Abnormal EKG    Hyperlipidemia    Rosacea    Seborrheic dermatitis    Pneumonia due to COVID-19 virus    Metabolic encephalopathy    Stage 3a chronic kidney disease    Cytokine release syndrome, grade 2    Age-related cognitive decline    Acute encephalopathy    Non-traumatic rhabdomyolysis     Past Medical History:   Diagnosis Date    Pneumonia due to COVID-19 virus 2021     Past Surgical History:   Procedure Laterality Date    HERNIA REPAIR      UMBILICAL HERNIA REPAIR N/A 2019    Procedure: excision of umbilical hernia mesh and umbilical hernia repair;  Surgeon: Luan Dowell MD;  Location: [facility] OR;  Service: General    WRIST FRACTURE SURGERY Left       General Information       Row Name 2358          OT Time and Intention    Document Type evaluation  -LAMAR     Mode of Treatment individual therapy;occupational therapy  -LAMAR       Row Name 23          General Information    Patient Profile Reviewed yes  -LAMAR     Prior Level of Function independent:;ADL's  reports he uses a rollator. poor historian due to cognition  -KA     Existing Precautions/Restrictions fall  -LAMAR     Barriers to Rehab cognitive status  -LAMAR       Row Name 23          Living Environment    People in Home friend(s)  -LAMAR       Row Name 2358          Home Main Entrance    Number of Stairs, Main Entrance none  -LAMAR       Row Name 23          Stairs Within Home,  Primary    Number of Stairs, Within Home, Primary none  -       Row Name 11/09/23 0958          Cognition    Orientation Status (Cognition) oriented to;person  -       Row Name 11/09/23 0958          Safety Issues, Functional Mobility    Impairments Affecting Function (Mobility) balance;endurance/activity tolerance;strength;shortness of breath  -               User Key  (r) = Recorded By, (t) = Taken By, (c) = Cosigned By      Initials Name Provider Type    Michelle Shaffer OT Occupational Therapist                     Mobility/ADL's       Row Name 11/09/23 1000          Bed Mobility    Comment, (Bed Mobility) [facility] at the start and end of session  -       Row Name 11/09/23 1000          Transfers    Transfers toilet transfer  -[facility] Name 11/09/23 1000          Sit-Stand Transfer    Sit-Stand [place] (Transfers) moderate assist (50% patient effort);maximum assist (25% patient effort);set up;verbal cues;nonverbal cues (demo/gesture)  -     Assistive Device (Sit-Stand Transfers) walker, front-wheeled  -       Row Name 11/09/23 1000          Stand-Sit Transfer    Stand-Sit [place] (Transfers) moderate assist (50% patient effort);maximum assist (25% patient effort);set up  -     Assistive Device (Stand-Sit Transfers) walker, front-wheeled  -       Row Name 11/09/23 1000          Toilet Transfer    Type (Toilet Transfer) stand pivot/stand step  -     [place] Level (Toilet Transfer) moderate assist (50% patient effort)  -     Assistive Device (Toilet Transfer) walker, front-wheeled  -     Comment, (Toilet Transfer) Used rwx to take steps from chair to BSC  -       Row Name 11/09/23 1000          Functional Mobility    Functional Mobility- Ind. Level moderate assist (50% patient effort)  -     Functional Mobility- Device walker, front-wheeled  -     Functional Mobility-Distance (Feet) --  steps to bedside commode  -[facility] Name 11/09/23 1000          Activities of  Daily Living    BADL Assessment/Intervention lower body dressing;toileting;grooming  -KA       Row Name 11/09/23 1000          Lower Body Dressing Assessment/Training    [place] Level (Lower Body Dressing) lower body dressing skills;doff;don;socks;maximum assist (25% patient effort)  -KA     Position (Lower Body Dressing) supported sitting  -KA       Row Name 11/09/23 1000          Toileting Assessment/Training    [place] Level (Toileting) toileting skills;adjust/manage clothing;perform perineal hygiene;change pad/brief;maximum assist (25% patient effort)  -KA     Position (Toileting) supported standing  -KA     Comment, (Toileting) pt attempted to let go of rwx to help and required mod-max A from therapist for balance. Cued pt to hold to rwx as therapist assisted due to pt's decreased balance  -       Row Name 11/09/23 1000          Grooming Assessment/Training    [place] Level (Grooming) grooming skills;wash face, hands;supervision;verbal cues  -KA     Position (Grooming) supported sitting  -KA               User Key  (r) = Recorded By, (t) = Taken By, (c) = Cosigned By      Initials Name Provider Type    Michelle Shaffer OT Occupational Therapist                   Obj/Interventions       Row Name 11/09/23 1002          Sensory Assessment (Somatosensory)    Sensory Assessment (Somatosensory) sensation intact  -       Row Name 11/09/23 1002          Range of Motion Comprehensive    General Range of Motion bilateral upper extremity ROM WFL  -       Row Name 11/09/23 1002          Strength Comprehensive (MMT)    General Manual Muscle Testing (MMT) Assessment upper extremity strength deficits identified  -     Comment, General Manual Muscle Testing (MMT) Assessment Pt unable to understand MMT directions. At least BUE >3/5  -       Row Name 11/09/23 1002          Balance    Balance Assessment sitting dynamic balance  -KA     Static Sitting Balance contact guard;standby assist  -      Dynamic Sitting Balance contact guard  seated on [facility]  -     Position, Sitting Balance --  seated on [facility]     Static Standing Balance moderate assist;maximum assist;verbal cues;non-verbal cues (demo/gesture)  -     Position/Device Used, Standing Balance walker, front-wheeled  -     Balance Interventions sitting;standing;occupation based/functional task  -               User Key  (r) = Recorded By, (t) = Taken By, (c) = Cosigned By      Initials Name Provider Type    Michelle Shaffer, OT Occupational Therapist                   Goals/Plan       Row Name 11/09/23 1041          Transfer Goal 1 (OT)    Activity/Assistive Device (Transfer Goal 1, OT) toilet  -     [place] Level/Cues Needed (Transfer Goal 1, OT) minimum assist (75% or more patient effort)  -     Time Frame (Transfer Goal 1, OT) short term goal (STG);2 weeks  -     Progress/Outcome (Transfer Goal 1, OT) goal ongoing  -       Row Name 11/09/23 1041          Bathing Goal 1 (OT)    Activity/Device (Bathing Goal 1, OT) bathing skills, all  -     [place] Level/Cues Needed (Bathing Goal 1, OT) minimum assist (75% or more patient effort)  -     Time Frame (Bathing Goal 1, OT) short term goal (STG);2 weeks  -KA     Progress/Outcomes (Bathing Goal 1, OT) goal ongoing  -       Row Name 11/09/23 1041          Toileting Goal 1 (OT)    Activity/Device (Toileting Goal 1, OT) toileting skills, all  -     [place] Level/Cues Needed (Toileting Goal 1, OT) minimum assist (75% or more patient effort)  -     Time Frame (Toileting Goal 1, OT) short term goal (STG);2 weeks  -KA     Progress/Outcome (Toileting Goal 1, OT) goal ongoing  -       Row Name 11/09/23 1041          Grooming Goal 1 (OT)    Activity/Device (Grooming Goal 1, OT) grooming skills, all  -KA     [place] (Grooming Goal 1, OT) supervision required;set-up required  -     Time Frame (Grooming Goal 1, OT) short term goal (STG);2 weeks  -     Progress/Outcome  (Grooming Goal 1, OT) goal ongoing  -       Row Name 11/09/23 1041          Therapy Assessment/Plan (OT)    Planned Therapy Interventions (OT) activity tolerance training;BADL retraining;functional balance retraining;patient/caregiver education/training;occupation/activity based interventions;transfer/mobility retraining;strengthening exercise;ROM/therapeutic exercise  -               User Key  (r) = Recorded By, (t) = Taken By, (c) = Cosigned By      Initials Name Provider Type    Michelle Shaffer, LETICIA Occupational Therapist                   Clinical Impression       Row Name 11/09/23 1038          Pain Assessment    Pretreatment Pain Rating 0/10 - no pain  -     Posttreatment Pain Rating 0/10 - no pain  -       Row Name 11/09/23 1038          Plan of Care Review    Plan of Care Reviewed With patient  -     Outcome Evaluation Pt seen for OT raiza this AM. Admitted with weakness after being down on the floor overnight. Hx of dementia. He reports he lives with a friend and was independent prior with ADLs and uses a rollator for functional mobility. Unsure of PLOF due to pt's cognitive status. He required mod to max A for STS from chair and to perform stand pivot transfers to BSC using a rwx. He required max A for all toileting needs due to decrease in standing balance. Pt attempted to let go of rwx to assist therapist with management of brief and pt demo LOB corrected by therapist. Dep for donning socks. SBA for seated grooming task. Pt presents below baseline with decreased strength, endurance, activity tolerance, ADL performance and functional mobility. Pt to benefit from skilled OT to address deficits and maximize independence with ADLs.  -       Row Name 11/09/23 1038          Therapy Assessment/Plan (OT)    Rehab Potential (OT) good, to achieve stated therapy goals  -     Criteria for Skilled Therapeutic Interventions Met (OT) yes  -LAMAR     Therapy Frequency (OT) 5 times/wk  -       Row Name  11/09/23 1038          Therapy Plan Review/Discharge Plan (OT)    Anticipated Discharge Disposition (OT) skilled nursing facility  -       Row Name 11/09/23 1038          Vital Signs    Pre Patient Position Sitting  -KA     Intra Patient Position Standing  -KA     Post Patient Position Sitting  -KA       Row Name 11/09/23 1038          Positioning and Restraints    Pre-Treatment Position sitting in chair/recliner  -KA     Post Treatment Position chair  -KA     In Chair notified nsg;reclined;call light within reach;encouraged to call for assist;exit alarm on  -KA               User Key  (r) = Recorded By, (t) = Taken By, (c) = Cosigned By      Initials Name Provider Type    Michelle Shaffer, LETICIA Occupational Therapist                   Outcome Measures       Row Name 11/09/23 1042          How much help from another is currently needed...    Putting on and taking off regular lower body clothing? 1  -KA     Bathing (including washing, rinsing, and drying) 2  -KA     Toileting (which includes using toilet bed pan or urinal) 1  -KA     Putting on and taking off regular upper body clothing 2  -KA     Taking care of personal grooming (such as brushing teeth) 3  -KA     Eating meals 3  -KA     AM-PAC 6 Clicks Score (OT) 12  -KA       Row Name 11/09/23 0900 11/09/23 0800       How much help from another person do you currently need...    Turning from your back to your side while in flat bed without using bedrails? 2  - 1  -JK    Moving from lying on back to sitting on the side of a flat bed without bedrails? 2  - 1  -JK    Moving to and from a bed to a chair (including a wheelchair)? 2  - 1  -JK    Standing up from a chair using your arms (e.g., wheelchair, bedside chair)? 2  - 1  -JK    Climbing 3-5 steps with a railing? 1  - 1  -JK    To walk in hospital room? 2  - 1  -JK    AM-PAC 6 Clicks Score (PT) 11  - 6  -JK    Highest level of mobility 4 --> Transferred to chair/commode  - 2 --> Bed  activities/dependent transfer  -JK      Row Name 11/09/23 1042 11/09/23 0900       Functional Assessment    Outcome Measure Options AM-PAC 6 Clicks Daily Activity (OT)  - AM-PAC 6 Clicks Basic Mobility (PT)  -              User Key  (r) = Recorded By, (t) = Taken By, (c) = Cosigned By      Initials Name Provider Type     Susan Kan, PT Physical Therapist    Sanjuanita Vivas, RN Registered Nurse    Michelle Shaffer OT Occupational Therapist                    Occupational Therapy Education       Title: PT OT SLP Therapies (In Progress)       Topic: Occupational Therapy (Done)       Point: ADL training (Done)       Description:   Instruct learner(s) on proper safety adaptation and remediation techniques during self care or transfers.   Instruct in proper use of assistive devices.                  Learning Progress Summary             Patient Acceptance, E, VU,DU by LAMAR at 11/9/2023 1042                         Point: Home exercise program (Done)       Description:   Instruct learner(s) on appropriate technique for monitoring, assisting and/or progressing therapeutic exercises/activities.                  Learning Progress Summary             Patient Acceptance, E, VU,DU by LAMAR at 11/9/2023 1042                         Point: Precautions (Done)       Description:   Instruct learner(s) on prescribed precautions during self-care and functional transfers.                  Learning Progress Summary             Patient Acceptance, E, VU,DU by LAMAR at 11/9/2023 1042                                         User Key       Initials Effective Dates Name Provider Type Rex NEWTON 09/22/22 -  Michelle Chairez OT Occupational Therapist OT                  OT Recommendation and Plan  Planned Therapy Interventions (OT): activity tolerance training, BADL retraining, functional balance retraining, patient/caregiver education/training, occupation/activity based interventions, transfer/mobility retraining,  strengthening exercise, ROM/therapeutic exercise  Therapy Frequency (OT): 5 times/wk  Plan of Care Review  Plan of Care Reviewed With: patient  Outcome Evaluation: Pt seen for OT eval this AM. Admitted with weakness after being down on the floor overnight. Hx of dementia. He reports he lives with a friend and was independent prior with ADLs and uses a rollator for functional mobility. Unsure of PLOF due to pt's cognitive status. He required mod to max A for STS from chair and to perform stand pivot transfers to [facility] using a rwx. He required max A for all toileting needs due to decrease in standing balance. Pt attempted to let go of rwx to assist therapist with management of brief and pt demo LOB corrected by therapist. Dep for donning socks. SBA for seated grooming task. Pt presents below baseline with decreased strength, endurance, activity tolerance, ADL performance and functional mobility. Pt to benefit from skilled OT to address deficits and maximize independence with ADLs.     Time Calculation:   Evaluation Complexity (OT)  Review Occupational Profile/Medical/Therapy History Complexity: expanded/moderate complexity  Assessment, Occupational Performance/Identification of Deficit Complexity: 3-5 performance deficits  Clinical Decision Making Complexity (OT): detailed assessment/moderate complexity  Overall Complexity of Evaluation (OT): moderate complexity     Time Calculation- OT       Row Name 11/09/23 1043             Time Calculation- OT    OT Start Time 0936  -KA      OT Stop Time 1025  -KA      OT Time Calculation (min) 49 min  -KA      OT Received On 11/09/23  -KA      OT - Next Appointment 11/10/23  -KA      OT Goal Re-Cert Due Date 11/23/23  -KA         Timed Charges    29297 - OT Self Care/Mgmt Minutes 29  -KA         Untimed Charges    OT Eval/Re-eval Minutes 20  -KA         Total Minutes    Timed Charges Total Minutes 29  -KA      Untimed Charges Total Minutes 20  -KA       Total Minutes 49  -KA                 User Key  (r) = Recorded By, (t) = Taken By, (c) = Cosigned By      Initials Name Provider Type    Michelle Shaffer OT Occupational Therapist                  Therapy Charges for Today       Code Description Service Date Service Provider Modifiers Qty    [id number]  OT SELF CARE/MGMT/TRAIN EA 15 MIN 11/9/2023 Michelle Chairez OT GO 2    [id number]  OT EVAL MOD COMPLEXITY 3 11/9/2023 Michelle Chairez OT GO 1                 Michelle Chairez OT  11/9/2023

## 2023-11-09 NOTE — THERAPY EVALUATION
Acute Care - Physical Therapy Initial Evaluation  [facility]     Patient Name: Jake Kan  : 1945  MRN: [id number]  Today's Date: 2023   Onset of Illness/Injury or Date of Surgery: 23  Visit Dx:     ICD-10-CM ICD-9-CM   1. Altered mental status, unspecified altered mental status type  R41.82 780.97   2. Non-traumatic rhabdomyolysis  M62.82 728.88   3. Troponin level elevated  R79.89 790.6   4. COVID-19 virus infection  U07.1 079.89     Patient Active Problem List   Diagnosis    Sciatica of right side    Gastroesophageal reflux disease without esophagitis    Abnormal EKG    Hyperlipidemia    Rosacea    Seborrheic dermatitis    Pneumonia due to COVID-19 virus    Metabolic encephalopathy    Stage 3a chronic kidney disease    Cytokine release syndrome, grade 2    Age-related cognitive decline    Acute encephalopathy    Non-traumatic rhabdomyolysis     Past Medical History:   Diagnosis Date    Pneumonia due to COVID-19 virus 2021     Past Surgical History:   Procedure Laterality Date    HERNIA REPAIR      UMBILICAL HERNIA REPAIR N/A 2019    Procedure: excision of umbilical hernia mesh and umbilical hernia repair;  Surgeon: Luan Dowell MD;  Location: [facility] OR;  Service: General    WRIST FRACTURE SURGERY Left      PT Assessment (last 12 hours)       PT Evaluation and Treatment       Row Name 23          Physical Therapy Time and Intention    Subjective Information no complaints  -     Document Type evaluation  -     Mode of Treatment individual therapy;physical therapy  -     Patient Effort good  -     Symptoms Noted During/After Treatment none  -       Row Name 23          General Information    Patient Profile Reviewed yes  -     Onset of Illness/Injury or Date of Surgery 23  -     Patient Observations agree to therapy  -     General Observations of Patient Patient supine in bed no acute distress  -LH     Prior Level of  Function independent:  Patient reports he was independent no DME however questionable historian  -     Equipment Currently Used at Home none  -     Pertinent History of Current Functional Problem COVID +  -     Existing Precautions/Restrictions fall  -     Benefits Reviewed patient:  -     Barriers to Rehab cognitive status  -       Row Name 11/09/23 0900          Living Environment    Current Living Arrangements home  -     People in Home friend(s)  -       Row Name 11/09/23 0900          Cognition    Affect/Mental Status (Cognition) confused  -     Orientation Status (Cognition) oriented to;person  -       Row Name 11/09/23 0900          Range of Motion Comprehensive    Comment, General Range of Motion LEs AROM WFL for age  -       Row Name 11/09/23 0900          Strength Comprehensive (MMT)    Comment, General Manual Muscle Testing (MMT) Assessment Lower extremities grossly at least 3 out of 5  -[facility] Name 11/09/23 0900          Bed Mobility    Bed Mobility sit-supine;supine-sit  -     Supine-Sit [place] (Bed Mobility) moderate assist (50% patient effort);maximum assist (25% patient effort)  -     Bed Mobility, Safety Issues cognitive deficits limit understanding  -     Assistive Device (Bed Mobility) bed rails;draw sheet;head of bed elevated  -     Comment, (Bed Mobility) Increased time required to complete task  -       Row Name 11/09/23 0900          Transfers    Transfers stand-sit transfer;sit-stand transfer  -       Row Name 11/09/23 0900          Sit-Stand Transfer    Sit-Stand [place] (Transfers) moderate assist (50% patient effort);maximum assist (25% patient effort);set up;verbal cues;nonverbal cues (demo/gesture)  -     Assistive Device (Sit-Stand Transfers) walker, front-wheeled  -       Row Name 11/09/23 0900          Stand-Sit Transfer    Stand-Sit [place] (Transfers) moderate assist (50% patient effort);maximum assist (25% patient  effort);set up  -     Assistive Device (Stand-Sit Transfers) walker, front-wheeled  -       Row Name 11/09/23 0900          Gait/Stairs (Locomotion)    [place] Level (Gait) moderate assist (50% patient effort);set up;verbal cues;nonverbal cues (demo/gesture)  -     Assistive Device (Gait) walker, front-wheeled  -     Distance in Feet (Gait) 3  -     Pattern (Gait) step-to  -     Deviations/Abnormal Patterns (Gait) elijah decreased;bilateral deviations  -     Bilateral Gait Deviations forward flexed posture;heel strike decreased  -       Row Name 11/09/23 0900          Balance    Balance Assessment sitting static balance;standing static balance  -     Static Sitting Balance contact guard  -     Position, Sitting Balance supported;sitting edge of bed  -     Static Standing Balance moderate assist;maximum assist;set-up;verbal cues;non-verbal cues (demo/gesture)  -     Position/Device Used, Standing Balance supported;walker, front-wheeled  -[facility] Name 11/09/23 0900          Motor Skills    Therapeutic Exercise --  Apx 10  -[facility] Name 11/09/23 0900          Plan of Care Review    Plan of Care Reviewed With patient  -     Outcome Evaluation Patient 78 year-old male admited with weakness after being on the floor,  has history of dementia, lives with friend.  On PT exam patient oriented to self only, says lorena in [place].  Required mod to max assist for bed mobility and stand pivot transfer to a chair with a walker, chair alarm in place.  Patient reports he does not typically use any DME however poor historian.  Patient may benefit from skilled PT services acutely for generalized strength training and with discharge planning anticipate facility needs.  -       Row Name 11/09/23 0900          Positioning and Restraints    Pre-Treatment Position in bed  -     Post Treatment Position chair  -     In Chair reclined;call light within reach;encouraged to call for assist;exit  alarm on  -[facility] Name 11/09/23 0900          Therapy Assessment/Plan (PT)    Rehab Potential (PT) fair, will monitor progress closely  -     Criteria for Skilled Interventions Met (PT) yes  -     Therapy Frequency (PT) 3 times/wk  -[facility] Name 11/09/23 0900          PT Evaluation Complexity    History, PT Evaluation Complexity 3 or more personal factors and/or comorbidities  -     Examination of Body Systems (PT Eval Complexity) total of 3 or more elements  -     Clinical Presentation (PT Evaluation Complexity) evolving  -     Clinical Decision Making (PT Evaluation Complexity) moderate complexity  -     Overall Complexity (PT Evaluation Complexity) moderate complexity  -[facility] Name 11/09/23 0900          Physical Therapy Goals    Bed Mobility Goal Selection (PT) bed mobility, PT goal 1  -     Transfer Goal Selection (PT) transfer, PT goal 1  -     Gait Training Goal Selection (PT) gait training, PT goal 1  -LH       Row Name 11/09/23 0900          Bed Mobility Goal 1 (PT)    Activity/Assistive Device (Bed Mobility Goal 1, PT) bed mobility activities, all  -     [place] Level/Cues Needed (Bed Mobility Goal 1, PT) minimum assist (75% or more patient effort)  -     Time Frame (Bed Mobility Goal 1, PT) 2 weeks  -[facility] Name 11/09/23 0900          Transfer Goal 1 (PT)    Activity/Assistive Device (Transfer Goal 1, PT) bed-to-chair/chair-to-bed;sit-to-stand/stand-to-sit;walker, rolling  -     [place] Level/Cues Needed (Transfer Goal 1, PT) minimum assist (75% or more patient effort)  -     Time Frame (Transfer Goal 1, PT) 2 weeks  -LH       Row Name 11/09/23 0900          Gait Training Goal 1 (PT)    Activity/Assistive Device (Gait Training Goal 1, PT) assistive device use;walker, rolling  -     [place] Level (Gait Training Goal 1, PT) minimum assist (75% or more patient effort)  -     Distance (Gait Training Goal 1, PT) 40  -     Time Frame (Gait  Training Goal 1, PT) 2 weeks  -               User Key  (r) = Recorded By, (t) = Taken By, (c) = Cosigned By      Initials Name Provider Type     Susan Kan, PT Physical Therapist                    Physical Therapy Education       Title: PT OT SLP Therapies (In Progress)       Topic: Physical Therapy (In Progress)       Point: Mobility training (In Progress)       Learning Progress Summary             Patient Acceptance, E, NR by  at 11/9/2023 0939                         Point: Home exercise program (In Progress)       Learning Progress Summary             Patient Acceptance, E, NR by  at 11/9/2023 0939                         Point: Body mechanics (In Progress)       Learning Progress Summary             Patient Acceptance, E, NR by  at 11/9/2023 0939                         Point: Precautions (In Progress)       Learning Progress Summary             Patient Acceptance, E, NR by  at 11/9/2023 0939                                         User Key       Initials Effective Dates Name Provider Type [facility] 06/16/21 -  Susan Kan, PT Physical Therapist PT                  PT Recommendation and Plan  Anticipated Discharge Disposition (PT): skilled nursing facility  Planned Therapy Interventions (PT): balance training, gait training, home exercise program, bed mobility training, ROM (range of motion), stair training, stretching, strengthening, transfer training  Therapy Frequency (PT): 3 times/wk  Plan of Care Reviewed With: patient  Outcome Evaluation: Patient 78 year-old male admited with weakness after being on the floor,  has history of dementia, lives with friend.  On PT exam patient oriented to self only, says lorena in [place].  Required mod to max assist for bed mobility and stand pivot transfer to a chair with a walker, chair alarm in place.  Patient reports he does not typically use any DME however poor historian.  Patient may benefit from skilled PT services acutely for generalized  strength training and with discharge planning anticipate facility needs.   Outcome Measures       Row Name 11/09/23 0900             How much help from another person do you currently need...    Turning from your back to your side while in flat bed without using bedrails? 2  -LH      Moving from lying on back to sitting on the side of a flat bed without bedrails? 2  -LH      Moving to and from a bed to a chair (including a wheelchair)? 2  -LH      Standing up from a chair using your arms (e.g., wheelchair, bedside chair)? 2  -LH      Climbing 3-5 steps with a railing? 1  -LH      To walk in hospital room? 2  -      AM-PAC 6 Clicks Score (PT) 11  -      Highest level of mobility 4 --> Transferred to chair/commode  -         Functional Assessment    Outcome Measure Options AM-PAC 6 Clicks Basic Mobility (PT)  -                User Key  (r) = Recorded By, (t) = Taken By, (c) = Cosigned By      Initials Name Provider Type     Susan Kan, PT Physical Therapist                     Time Calculation:    PT Charges       Row Name 11/09/23 0942             Time Calculation    Start Time 0901  -      Stop Time 0920  -      Time Calculation (min) 19 min  -      PT Received On 11/09/23  -      PT - Next Appointment 11/10/23  -      PT Goal Re-Cert Due Date 11/23/23  -         Time Calculation- PT    Total Timed Code Minutes- PT 10 minute(s)  -                User Key  (r) = Recorded By, (t) = Taken By, (c) = Cosigned By      Initials Name Provider Type     Susan Kan, PT Physical Therapist                  Therapy Charges for Today       Code Description Service Date Service Provider Modifiers Qty    34311498688  PT EVAL MOD COMPLEXITY 3 11/9/2023 Susan Kan, PT GP 1    92825252991 HC PT THER PROC EA 15 MIN 11/9/2023 Susan Kan, PT GP 1            PT G-Codes  Outcome Measure Options: AM-PAC 6 Clicks Basic Mobility (PT)  AM-PAC 6 Clicks Score (PT): 11    Susan Kan PT  11/9/2023

## 2023-11-09 NOTE — PLAN OF CARE
Goal Outcome Evaluation:         Pt resting in bed , sleeping most of shift , alert to self, Room air , SR on tele , IVF continues to infuse , Incontinent , turn q 2 . VSS, No sign of acute distress noted. Plan of care is ongoing.

## 2023-11-09 NOTE — THERAPY EVALUATION
Acute Care - Speech Language Pathology   Swallow Initial Evaluation [facility]     Patient Name: Jake Kan  : 1945  MRN: [id number]  Today's Date: 2023  Onset of Illness/Injury or Date of Surgery: 23            Admit Date: 2023    Visit Dx:     ICD-10-CM ICD-9-CM   1. Altered mental status, unspecified altered mental status type  R41.82 780.97   2. Non-traumatic rhabdomyolysis  M62.82 728.88   3. Troponin level elevated  R79.89 790.6   4. COVID-19 virus infection  U07.1 079.89     Patient Active Problem List   Diagnosis    Sciatica of right side    Gastroesophageal reflux disease without esophagitis    Abnormal EKG    Hyperlipidemia    Rosacea    Seborrheic dermatitis    Pneumonia due to COVID-19 virus    Metabolic encephalopathy    Stage 3a chronic kidney disease    Cytokine release syndrome, grade 2    Age-related cognitive decline    Acute encephalopathy    Non-traumatic rhabdomyolysis     Past Medical History:   Diagnosis Date    Pneumonia due to COVID-19 virus 2021     Past Surgical History:   Procedure Laterality Date    HERNIA REPAIR      UMBILICAL HERNIA REPAIR N/A 2019    Procedure: excision of umbilical hernia mesh and umbilical hernia repair;  Surgeon: Luan Dowell MD;  Location: [facility] MAIN OR;  Service: General    WRIST FRACTURE SURGERY Left        SLP Recommendation and Plan  SLP Swallowing Diagnosis: R/O pharyngeal dysphagia (23 103)  SLP Diet Recommendation: soft to chew textures, chopped, no mixed consistencies, nectar thick liquids, ice chips between meals after oral care, with supervision, water between meals after oral care, with supervision (23 103)  Recommended Precautions and Strategies: upright posture during/after eating, small bites of food and sips of liquid (23 103)  SLP Rec. for Method of Medication Administration: meds whole, with thick liquids, with puree, as tolerated (23)     Monitor for Signs of  Aspiration: yes, notify SLP if any concerns (11/09/23 1030)  Recommended Diagnostics: reassess via clinical swallow evaluation (11/09/23 1030)  Swallow Criteria for Skilled Therapeutic Interventions Met: demonstrates skilled criteria (11/09/23 1030)  Anticipated Discharge Disposition (SLP): unknown (11/09/23 1030)  Rehab Potential/Prognosis, Swallowing: good, to achieve stated therapy goals (11/09/23 1030)  Therapy Frequency (Swallow): PRN (11/09/23 1030)  Predicted Duration Therapy Intervention (Days): until discharge (11/09/23 1030)  Oral Care Recommendations: Oral Care BID/PRN, Before ice/water (11/09/23 1030)                                      Oral Care Recommendations: Oral Care BID/PRN, Before ice/water (11/09/23 1030)    Outcome Evaluation: Patient seen for clinical swallow assessment. No family present at bedside. Per RN, patient had inconsistent wet vocal quality with thin liquids. Requested speech eval to determine safest diet. Patient agreeable to p.o. trials. Vocal quality hoarse prior assessment. He tolerated ice chips and thin via spoon with no overt s/sx of aspiration or penetration. Wet vocal quality x2 with thin by cup and throat clear with thin by straw. No overt s/sx of aspiration noted with nectar thick liquids or puree solids. Mastication slightly prolonged, but functional for soft and regular solids. Slow oral transit with regular cracker. Utilized liquid wash to clear oral residue. At this time, SLP recommends soft, chopped solids (no mixed) with nectar thick liquids. May have ice or water sparingly in between meals after oral care. Medication whole with thickened liquids or applesauce. Precautions: slow rate, small bites, upright for meals. ST to follow for diet tolerance/re-evaluation.      SWALLOW EVALUATION (last 72 hours)       SLP Adult Swallow Evaluation       Row Name 11/09/23 1030       Rehab Evaluation    Document Type evaluation  -SR    Subjective Information no complaints  -SR     Patient Observations alert;cooperative;agree to therapy  -SR    Patient Effort good  -SR    Symptoms Noted During/After Treatment none  -SR       General Information    Patient Profile Reviewed yes  -SR    Pertinent History Of Current Problem 78 y.o. male; presented to [facility] with generalized weakness. Diagnosed with pneumonia secondary to COVID. PMHx significant for dementia  -SR    Current Method of Nutrition regular textures;thin liquids  -SR    Precautions/Limitations, Vision WFL;for purposes of eval  -SR    Precautions/Limitations, Hearing WFL;for purposes of eval  -SR    Prior Level of Function-Communication cognitive-linguistic impairment;other (see comments)  dementia, per chart  -SR    Prior Level of Function-Swallowing no diet consistency restrictions  -SR    Plans/Goals Discussed with patient;agreed upon  -SR    Barriers to Rehab cognitive status  -SR       Pain Scale: Numbers Pre/Post-Treatment    Pretreatment Pain Rating 0/10 - no pain  -SR    Posttreatment Pain Rating 0/10 - no pain  -SR       Oral Motor Structure and Function    Dentition Assessment natural, present and adequate  -SR    Secretion Management WNL/WFL  -SR    Mucosal Quality dry  -SR    Volitional Swallow WFL  -SR    Volitional Cough weak  -SR       Oral Musculature and Cranial Nerve Assessment    Oral Motor General Assessment vocal impairment  -SR    Vocal Impairment, Detail. Cranial Nerve X (Vagus) vocal quality abnormality (see comments);other (see comments)  hoarse vocal quality  -SR       General Eating/Swallowing Observations    Respiratory Support Currently in Use room air  -SR    Eating/Swallowing Skills self-fed;fed by SLP  -SR    Positioning During Eating upright 90 degree;upright in chair  -SR    Utensils Used spoon;cup;straw  -SR    Consistencies Trialed ice chips;thin liquids;nectar/syrup-thick liquids;mechanical ground textures;mixed consistency;regular textures  -SR       Clinical Swallow Eval    Clinical Swallow Evaluation  Summary Patient seen for clinical swallow assessment. No family present at bedside. Per RN, patient had inconsistent wet vocal quality with thin liquids. Requested speech eval to determine safest diet. Patient agreeable to p.o. trials. Vocal quality hoarse prior assessment. He tolerated ice chips and thin via spoon with no overt s/sx of aspiration or penetration. Wet vocal quality x2 with thin by cup and throat clear with thin by straw. No overt s/sx of aspiration noted with nectar thick liquids or puree solids. Mastication slightly prolonged, but functional for soft and regular solids. Slow oral transit with regular cracker. Utilized liquid wash to clear oral residue.  At this time, SLP recommends soft, chopped solids (no mixed) with nectar thick liquids. May have ice or water sparingly in between meals after oral care. Medication whole with thickened liquids or applesauce. Precautions: slow rate, small bites, upright for meals. ST to follow for diet tolerance/re-evaluation.  -SR       SLP Evaluation Clinical Impression    SLP Swallowing Diagnosis R/O pharyngeal dysphagia  -SR    Functional Impact risk of aspiration/pneumonia  -SR    Rehab Potential/Prognosis, Swallowing good, to achieve stated therapy goals  -SR    Swallow Criteria for Skilled Therapeutic Interventions Met demonstrates skilled criteria  -SR       Recommendations    Therapy Frequency (Swallow) PRN  -SR    Predicted Duration Therapy Intervention (Days) until discharge  -SR    SLP Diet Recommendation soft to chew textures;chopped;no mixed consistencies;nectar thick liquids;ice chips between meals after oral care, with supervision;water between meals after oral care, with supervision  -SR    Recommended Diagnostics reassess via clinical swallow evaluation  -SR    Recommended Precautions and Strategies upright posture during/after eating;small bites of food and sips of liquid  -SR    Oral Care Recommendations Oral Care BID/PRN;Before ice/water  -SR     SLP Rec. for Method of Medication Administration meds whole;with thick liquids;with puree;as tolerated  -SR    Monitor for Signs of Aspiration yes;notify SLP if any concerns  -SR    Anticipated Discharge Disposition (SLP) unknown  -SR       Swallow Goals (SLP)    Swallow LTGs Patient will demonstrate progress toward functional swallow for  -SR       (LTG) Patient will demonstrate progress toward functional swallow for    Diet Texture (Demonstrate progress toward functional swallow) regular textures  -SR    Liquid viscosity (Demonstrate progress toward functional swallow) thin liquids  -SR    [place] (Demonstrate progress towards functional swallow) independently (over 90% accuracy)  -SR    Time Frame (Demonstrate progress toward functional swallow) 1 week  -SR    Progress/Outcomes (Demonstrate progress toward functional swallow) new goal  -SR              User Key  (r) = Recorded By, (t) = Taken By, (c) = Cosigned By      Initials Name Effective Dates    Morena Eden CCC-SLP 11/10/22 -                     EDUCATION  The patient has been educated in the following areas:   Dysphagia (Swallowing Impairment) Oral Care/Hydration.        SLP GOALS       Row Name 11/09/23 1030             (LTG) Patient will demonstrate progress toward functional swallow for    Diet Texture (Demonstrate progress toward functional swallow) regular textures  -SR      Liquid viscosity (Demonstrate progress toward functional swallow) thin liquids  -SR      [place] (Demonstrate progress towards functional swallow) independently (over 90% accuracy)  -SR      Time Frame (Demonstrate progress toward functional swallow) 1 week  -SR      Progress/Outcomes (Demonstrate progress toward functional swallow) new goal  -SR                User Key  (r) = Recorded By, (t) = Taken By, (c) = Cosigned By      Initials Name Provider Type    Morena Eden CCC-SLP Speech and Language Pathologist                       Time Calculation:     Time Calculation- SLP       Row Name 11/09/23 1333             Time Calculation- SLP    SLP Start Time 1030  -SR      SLP Received On 11/09/23  -SR         Untimed Charges    SLP Eval/Re-eval  ST Eval Oral Pharyng Swallow - 54069  -SR      [id number] Eval Oral Pharyng Swallow Minutes 60  -SR         Total Minutes    Untimed Charges Total Minutes 60  -SR       Total Minutes 60  -SR                User Key  (r) = Recorded By, (t) = Taken By, (c) = Cosigned By      Initials Name Provider Type    SR Morena Soni CCC-SLP Speech and Language Pathologist                    Therapy Charges for Today       Code Description Service Date Service Provider Modifiers Qty    01910314073 HC ST EVAL ORAL PHARYNG SWALLOW 4 11/9/2023 Morena Soni CCC-SLP GN 1                 RACHEL Calhoun  11/9/2023

## 2023-11-09 NOTE — PLAN OF CARE
Goal Outcome Evaluation:  Plan of Care Reviewed With: patient           Outcome Evaluation: Pt seen for OT raiza this AM. Admitted with weakness after being down on the floor overnight. Hx of dementia. He reports he lives with a friend and was independent prior with ADLs and uses a rollator for functional mobility. Unsure of PLOF due to pt's cognitive status. He required mod to max A for STS from chair and to perform stand pivot transfers to BSC using a rwx. He required max A for all toileting needs due to decrease in standing balance. Pt attempted to let go of rwx to assist therapist with management of brief and pt demo LOB corrected by therapist. Dep for donning socks. SBA for seated grooming task. Pt presents below baseline with decreased strength, endurance, activity tolerance, ADL performance and functional mobility. Pt to benefit from skilled OT to address deficits and maximize independence with ADLs.      Anticipated Discharge Disposition (OT): skilled nursing facility

## 2023-11-09 NOTE — PLAN OF CARE
Goal Outcome Evaluation:  Plan of Care Reviewed With: patient           Outcome Evaluation: Patient 78 year-old male admited with weakness after being on the floor,  has history of dementia, lives with friend.  On PT exam patient oriented to self only, says l[place]na in [place].  Required mod to max assist for bed mobility and stand pivot transfer to a chair with a walker, chair alarm in place.  Patient reports he does not typically use any DME however poor historian.  Patient may benefit from skilled PT services acutely for generalized strength training and with discharge planning anticipate facility needs.      Anticipated Discharge Disposition (PT): skilled nursing facility

## 2023-11-10 LAB
ALBUMIN SERPL-MCNC: 3.3 G/DL (ref 3.5–5.2)
ALBUMIN/GLOB SERPL: 1.3 G/DL
ALP SERPL-CCNC: 59 U/L (ref 39–117)
ALT SERPL W P-5'-P-CCNC: 16 U/L (ref 1–41)
ANION GAP SERPL CALCULATED.3IONS-SCNC: 8 MMOL/L (ref 5–15)
AST SERPL-CCNC: 40 U/L (ref 1–40)
BASOPHILS # BLD AUTO: 0.02 10*3/MM3 (ref 0–0.2)
BASOPHILS NFR BLD AUTO: 0.4 % (ref 0–1.5)
BILIRUB SERPL-MCNC: 1 MG/DL (ref 0–1.2)
BUN SERPL-MCNC: 19 MG/DL (ref 8–23)
BUN/CREAT SERPL: 16.2 (ref 7–25)
CALCIUM SPEC-SCNC: 8.7 MG/DL (ref 8.6–10.5)
CHLORIDE SERPL-SCNC: 105 MMOL/L (ref 98–107)
CK SERPL-CCNC: 1240 U/L (ref 20–200)
CO2 SERPL-SCNC: 25 MMOL/L (ref 22–29)
CREAT SERPL-MCNC: 1.17 MG/DL (ref 0.76–1.27)
CRP SERPL-MCNC: 8.72 MG/DL (ref 0–0.5)
D DIMER PPP FEU-MCNC: 1.73 MCGFEU/ML (ref 0–0.8)
DEPRECATED RDW RBC AUTO: 43.9 FL (ref 37–54)
EGFRCR SERPLBLD CKD-EPI 2021: 63 ML/MIN/1.73
EOSINOPHIL # BLD AUTO: 0.09 10*3/MM3 (ref 0–0.4)
EOSINOPHIL NFR BLD AUTO: 1.8 % (ref 0.3–6.2)
ERYTHROCYTE [DISTWIDTH] IN BLOOD BY AUTOMATED COUNT: 12.9 % (ref 12.3–15.4)
FERRITIN SERPL-MCNC: 405 NG/ML (ref 30–400)
GLOBULIN UR ELPH-MCNC: 2.6 GM/DL
GLUCOSE SERPL-MCNC: 90 MG/DL (ref 65–99)
HCT VFR BLD AUTO: 34.5 % (ref 37.5–51)
HGB BLD-MCNC: 12 G/DL (ref 13–17.7)
IMM GRANULOCYTES # BLD AUTO: 0.01 10*3/MM3 (ref 0–0.05)
IMM GRANULOCYTES NFR BLD AUTO: 0.2 % (ref 0–0.5)
LYMPHOCYTES # BLD AUTO: 0.95 10*3/MM3 (ref 0.7–3.1)
LYMPHOCYTES NFR BLD AUTO: 19.4 % (ref 19.6–45.3)
MCH RBC QN AUTO: 32.2 PG (ref 26.6–33)
MCHC RBC AUTO-ENTMCNC: 34.8 G/DL (ref 31.5–35.7)
MCV RBC AUTO: 92.5 FL (ref 79–97)
MONOCYTES # BLD AUTO: 0.57 10*3/MM3 (ref 0.1–0.9)
MONOCYTES NFR BLD AUTO: 11.7 % (ref 5–12)
NEUTROPHILS NFR BLD AUTO: 3.25 10*3/MM3 (ref 1.7–7)
NEUTROPHILS NFR BLD AUTO: 66.5 % (ref 42.7–76)
NRBC BLD AUTO-RTO: 0 /100 WBC (ref 0–0.2)
PLATELET # BLD AUTO: 195 10*3/MM3 (ref 140–450)
PMV BLD AUTO: 10.2 FL (ref 6–12)
POTASSIUM SERPL-SCNC: 3.8 MMOL/L (ref 3.5–5.2)
PROCALCITONIN SERPL-MCNC: 0.12 NG/ML (ref 0–0.25)
PROT SERPL-MCNC: 5.9 G/DL (ref 6–8.5)
QT INTERVAL: 410 MS
QTC INTERVAL: 449 MS
RBC # BLD AUTO: 3.73 10*6/MM3 (ref 4.14–5.8)
SODIUM SERPL-SCNC: 138 MMOL/L (ref 136–145)
WBC NRBC COR # BLD: 4.89 10*3/MM3 (ref 3.4–10.8)

## 2023-11-10 PROCEDURE — 85025 COMPLETE CBC W/AUTO DIFF WBC: CPT | Performed by: INTERNAL MEDICINE

## 2023-11-10 PROCEDURE — 86140 C-REACTIVE PROTEIN: CPT | Performed by: INTERNAL MEDICINE

## 2023-11-10 PROCEDURE — 25810000003 LACTATED RINGERS PER 1000 ML: Performed by: INTERNAL MEDICINE

## 2023-11-10 PROCEDURE — 84145 PROCALCITONIN (PCT): CPT | Performed by: INTERNAL MEDICINE

## 2023-11-10 PROCEDURE — 82550 ASSAY OF CK (CPK): CPT | Performed by: INTERNAL MEDICINE

## 2023-11-10 PROCEDURE — 80053 COMPREHEN METABOLIC PANEL: CPT | Performed by: INTERNAL MEDICINE

## 2023-11-10 PROCEDURE — 85379 FIBRIN DEGRADATION QUANT: CPT | Performed by: INTERNAL MEDICINE

## 2023-11-10 PROCEDURE — 92610 EVALUATE SWALLOWING FUNCTION: CPT

## 2023-11-10 PROCEDURE — 82728 ASSAY OF FERRITIN: CPT | Performed by: INTERNAL MEDICINE

## 2023-11-10 PROCEDURE — 25010000002 ENOXAPARIN PER 10 MG: Performed by: INTERNAL MEDICINE

## 2023-11-10 RX ADMIN — NIRMATRELVIR AND RITONAVIR 3 TABLET: KIT at 22:30

## 2023-11-10 RX ADMIN — ZINC OXIDE 1 APPLICATION: 200 OINTMENT TOPICAL at 22:30

## 2023-11-10 RX ADMIN — Medication 10 ML: at 22:31

## 2023-11-10 RX ADMIN — Medication 10 ML: at 08:24

## 2023-11-10 RX ADMIN — SENNOSIDES AND DOCUSATE SODIUM 2 TABLET: 50; 8.6 TABLET ORAL at 22:30

## 2023-11-10 RX ADMIN — SENNOSIDES AND DOCUSATE SODIUM 2 TABLET: 50; 8.6 TABLET ORAL at 08:23

## 2023-11-10 RX ADMIN — SODIUM CHLORIDE, POTASSIUM CHLORIDE, SODIUM LACTATE AND CALCIUM CHLORIDE 125 ML/HR: 600; 310; 30; 20 INJECTION, SOLUTION INTRAVENOUS at 02:10

## 2023-11-10 RX ADMIN — ZINC OXIDE 1 APPLICATION: 200 OINTMENT TOPICAL at 08:23

## 2023-11-10 RX ADMIN — NIRMATRELVIR AND RITONAVIR 3 TABLET: KIT at 08:23

## 2023-11-10 RX ADMIN — SODIUM CHLORIDE, POTASSIUM CHLORIDE, SODIUM LACTATE AND CALCIUM CHLORIDE 75 ML/HR: 600; 310; 30; 20 INJECTION, SOLUTION INTRAVENOUS at 22:32

## 2023-11-10 RX ADMIN — ENOXAPARIN SODIUM 40 MG: 100 INJECTION SUBCUTANEOUS at 14:46

## 2023-11-10 NOTE — THERAPY RE-EVALUATION
Acute Care - Speech Language Pathology   Swallow Re-Evaluation [facility]     Patient Name: Jake Kan  : 1945  MRN: [id number]  Today's Date: 11/10/2023  Onset of Illness/Injury or Date of Surgery: 23            Admit Date: 2023    Visit Dx:     ICD-10-CM ICD-9-CM   1. Altered mental status, unspecified altered mental status type  R41.82 780.97   2. Non-traumatic rhabdomyolysis  M62.82 728.88   3. Troponin level elevated  R79.89 790.6   4. COVID-19 virus infection  U07.1 079.89     Patient Active Problem List   Diagnosis    Sciatica of right side    Gastroesophageal reflux disease without esophagitis    Abnormal EKG    Hyperlipidemia    Rosacea    Seborrheic dermatitis    Pneumonia due to COVID-19 virus    Metabolic encephalopathy    Stage 3a chronic kidney disease    Cytokine release syndrome, grade 2    Age-related cognitive decline    Acute encephalopathy    Non-traumatic rhabdomyolysis     Past Medical History:   Diagnosis Date    Pneumonia due to COVID-19 virus 2021     Past Surgical History:   Procedure Laterality Date    HERNIA REPAIR      UMBILICAL HERNIA REPAIR N/A 2019    Procedure: excision of umbilical hernia mesh and umbilical hernia repair;  Surgeon: Luan Dowell MD;  Location: [facility] MAIN OR;  Service: General    WRIST FRACTURE SURGERY Left        SLP Recommendation and Plan                                                                                  Plan of Care Reviewed With: patient  Outcome Evaluation: RN agreeable to re-eval, says pt is more awake/alert today. Pt agreeable to re-eval. He accepted PO trials of water via tsp, water via straw, nectar via straw, puree, and solid. Timely and adequate mastication of regular solid, no oral residue. No overt s/s of aspiration with any consistency. x1 delayed cough inbetween trials of thin, however do not suspect d/t PO intake. Recommend regular diet and thin liquid. Meds whole as able. Strict supervision  and feeding assistance during meals. RN and pt verbalized understanding.      SWALLOW EVALUATION (last 72 hours)       SLP Adult Swallow Evaluation       Row Name 11/10/23 1200       Rehab Evaluation    Document Type re-evaluation  -HF    Subjective Information no complaints  -HF    Patient Observations alert;cooperative;agree to therapy  -HF    Patient Effort good  -HF    Symptoms Noted During/After Treatment none  -HF       General Information    Patient Profile Reviewed yes  -HF    Pertinent History Of Current Problem 78 y.o. male; presented to [facility] with generalized weakness. Diagnosed with pneumonia secondary to COVID. PMHx significant for dementia  -HF    Current Method of Nutrition regular textures;thin liquids  -HF    Precautions/Limitations, Vision WFL;for purposes of eval  -HF    Precautions/Limitations, Hearing WFL;for purposes of eval  -HF    Prior Level of Function-Communication --    Prior Level of Function-Swallowing soft to chew;nectar thick liquids  -HF    Plans/Goals Discussed with --    Barriers to Rehab --       Pain    Additional Documentation Pain Scale: FACES Pre/Post-Treatment (Group)  -HF       Pain Scale: Numbers Pre/Post-Treatment    Pretreatment Pain Rating 0/10 - no pain  -HF    Posttreatment Pain Rating --       Oral Motor Structure and Function    Dentition Assessment --    Secretion Management --    Mucosal Quality --    Volitional Swallow --    Volitional Cough --       Oral Musculature and Cranial Nerve Assessment    Oral Motor General Assessment --    Vocal Impairment, Detail. Cranial Nerve X (Vagus) --       General Eating/Swallowing Observations    Respiratory Support Currently in Use --    Eating/Swallowing Skills --    Positioning During Eating --    Utensils Used --    Consistencies Trialed --       Clinical Swallow Eval    Clinical Swallow Evaluation Summary --       SLP Evaluation Clinical Impression    SLP Swallowing Diagnosis --    Functional Impact --    Rehab  Potential/Prognosis, Swallowing --    Swallow Criteria for Skilled Therapeutic Interventions Met --       Recommendations    Therapy Frequency (Swallow) --    Predicted Duration Therapy Intervention (Days) --    SLP Diet Recommendation --    Recommended Diagnostics --    Recommended Precautions and Strategies --    Oral Care Recommendations --    SLP Rec. for Method of Medication Administration --    Monitor for Signs of Aspiration --    Anticipated Discharge Disposition (SLP) --       Swallow Goals (SLP)    Swallow LTGs --       (LTG) Patient will demonstrate progress toward functional swallow for    Diet Texture (Demonstrate progress toward functional swallow) --    Liquid viscosity (Demonstrate progress toward functional swallow) --    [place] (Demonstrate progress towards functional swallow) --    Time Frame (Demonstrate progress toward functional swallow) --    Progress/Outcomes (Demonstrate progress toward functional swallow) --              User Key  (r) = Recorded By, (t) = Taken By, (c) = Cosigned By      Initials Name Effective Dates    SR Morena Soni, CCC-SLP 11/10/22 -      Yovana Askew, CINDY 08/01/23 -                     EDUCATION  The patient has been educated in the following areas:   Dysphagia (Swallowing Impairment).        SLP GOALS       Row Name 11/10/23 1200       (LTG) Patient will demonstrate progress toward functional swallow for    Diet Texture (Demonstrate progress toward functional swallow) regular textures  -HF    Liquid viscosity (Demonstrate progress toward functional swallow) thin liquids  -HF    [place] (Demonstrate progress towards functional swallow) independently (over 90% accuracy)  -HF    Time Frame (Demonstrate progress toward functional swallow) 1 week  -HF    Progress/Outcomes (Demonstrate progress toward functional swallow) good progress toward goal  -HF    Comment (Demonstrate progress toward functional swallow) RN agreeable to re-eval, says pt is more  awake/alert today. Pt agreeable to re-eval. He accepted PO trials of water via tsp, water via straw, nectar via straw, puree, and solid. Timely and adequate mastication of regular solid, no oral residue. No overt s/s of aspiration with any consistency. x1 delayed cough inbetween trials of thin, however do not suspect d/t PO intake. Recommend regular diet and thin liquid. Meds whole as able. Strict supervision and feeding assistance during meals. RN and pt verbalized understanding.  -              User Key  (r) = Recorded By, (t) = Taken By, (c) = Cosigned By      Initials Name Provider Type     Morena Soni CCC-SLP Speech and Language Pathologist    Yovana Young SLP Speech and Language Pathologist                       Time Calculation:    Time Calculation- SLP       Row Name 11/10/23 1245             Time Calculation- SLP    SLP Start Time 1030  -HF      SLP Received On 11/10/23  -         Untimed Charges    SLP Eval/Re-eval  ST Eval Oral Pharyng Swallow - 41744  -                User Key  (r) = Recorded By, (t) = Taken By, (c) = Cosigned By      Initials Name Provider Type     Yovana Askew SLP Speech and Language Pathologist                    Therapy Charges for Today       Code Description Service Date Service Provider Modifiers Qty    [id number]  ST EVAL ORAL PHARYNG SWALLOW 4 11/10/2023 Yovana Askew SLP GN 1                 CINDY Mckeon  11/10/2023

## 2023-11-10 NOTE — PROGRESS NOTES
"Nutrition Services    Patient Name:  Jake Kan  YOB: 1945  MRN: [id number]  Admit Date:  11/8/2023    Assessment Date:  11/10/23    NUTRITION SCREENING      Reason for Encounter MST score 2+ unsure wt loss/ALEXANDER   Diagnosis/Problem PNA d/t COVID-19, acute encephalopathy, CKD3a, non-traumatic rhabdomyolysis    SLP re-eval swallow today and upgraded diet to regular with thins   PO Diet Diet: Regular/House Diet; Texture: Regular Texture (IDDSI 7); Fluid Consistency: Thin (IDDSI 0)   Supplements    PO Intake % 25-75%       Medications MAR reviewed by RD   Labs  Listed below, reviewed   Physical Findings Alert, disoriented, garbled speech, room air   GI Function Last BM 11/9   Skin Status Skin intact, bruised       Height  Weight  BMI  Weight Trend     Height: 180.3 cm (71\")  Weight: 74.2 kg (163 lb 9.3 oz) (11/08/23 1332)  Body mass index is 22.81 kg/m².  Loss, Amount/Timeframe: 11 lb (6%) wt loss x 2 months  No wasting noted on NFPE.       Nutrition Problem (PES) Nutrition appropriate for condition at this time as evidenced by adequate po intake of meals.       Intervention/Plan Pt with some recent weight loss, non-significant and no signs of wasting on NFPE. Not unusual for minor weight loss and decreased po intake with COVID dx. Tolerating diet with 25-75% po intake. Encouraged increased po intake at all meals.     RD to follow up per protocol.     Results from last 7 days   Lab Units 11/10/23  0610 11/09/23  0608 11/08/23  0822   SODIUM mmol/L 138 139 141   POTASSIUM mmol/L 3.8 4.0 4.2   CHLORIDE mmol/L 105 109* 106   CO2 mmol/L 25.0 23.1 25.0   BUN mg/dL 19 27* 28*   CREATININE mg/dL 1.17 1.20 1.22   CALCIUM mg/dL 8.7 8.8 9.7   BILIRUBIN mg/dL 1.0 0.7 0.7   ALK PHOS U/L 59 58 73   ALT (SGPT) U/L 16 15 22   AST (SGOT) U/L 40 38 50*   GLUCOSE mg/dL 90 96 121*     Results from last 7 days   Lab Units 11/10/23  0610 11/09/23  0608 11/08/23  0822   MAGNESIUM mg/dL  --   --  2.0   HEMOGLOBIN g/dL 12.0*   " "< > 13.9   HEMATOCRIT % 34.5*   < > 40.4    < > = values in this interval not displayed.     No results found for: \"HGBA1C\"      Electronically signed by:  Johanna Carrasco RD  11/10/23 14:15 EST  "

## 2023-11-10 NOTE — PLAN OF CARE
Goal Outcome Evaluation:         Pt resting in bed , AO to self , more alert this shift, incontinent , Room air , SR on tele , afebrile , BP  is trending up , Pt remains asymptomatic. IVF continues to infuse, turn q 2 , Plan of care is ongoing.

## 2023-11-10 NOTE — PROGRESS NOTES
Name: Jake Kan ADMIT: 2023   : 1945  PCP: Sree Thomas MD    MRN: [id number] LOS: 2 days   AGE/SEX: 78 y.o. male  ROOM: [facility]     Subjective   Subjective   No acute events. Patient has no new complaints. Taking PO. No CP/dyspnea/f/c/n/v/d.  No family at bedside.    Objective   Objective   Vital Signs  Temp:  [97.5 °F (36.4 °C)-98.5 °F (36.9 °C)] 97.5 °F (36.4 °C)  Heart Rate:  [53-73] 64  Resp:  [18] 18  BP: (150-172)/(69-79) 160/79  SpO2:  [93 %-97 %] 95 %  on   ;   Device (Oxygen Therapy): room air  Body mass index is 22.81 kg/m².  Physical Exam  Vitals and nursing note reviewed.   Constitutional:       Appearance: He is not toxic-appearing.   HENT:      Head: Normocephalic and atraumatic.      Nose: Nose normal.      Mouth/Throat:      Mouth: Mucous membranes are moist.      Pharynx: Oropharynx is clear.   Eyes:      Conjunctiva/sclera: Conjunctivae normal.      Pupils: Pupils are equal, round, and reactive to light.   Cardiovascular:      Rate and Rhythm: Normal rate and regular rhythm.      Pulses: Normal pulses.   Pulmonary:      Effort: Pulmonary effort is normal.      Breath sounds: Normal breath sounds. No wheezing, rhonchi or rales.   Abdominal:      General: Bowel sounds are normal.      Palpations: Abdomen is soft.      Tenderness: There is no abdominal tenderness.   Musculoskeletal:         General: No swelling or tenderness.      Cervical back: Normal range of motion and neck supple.   Skin:     General: Skin is warm and dry.      Capillary Refill: Capillary refill takes less than 2 seconds.   Neurological:      General: No focal deficit present.      Mental Status: He is alert.   Psychiatric:         Mood and Affect: Mood normal.         Behavior: Behavior normal.       Results Review     I reviewed the patient's new clinical results.  Results from last 7 days   Lab Units 11/10/23  0610 23  0608 23  0822   WBC 10*3/mm3 4.89 5.81 7.97   HEMOGLOBIN g/dL 12.0*  "12.1* 13.9   PLATELETS 10*3/mm3 195 190 210     Results from last 7 days   Lab Units 11/10/23  0610 11/09/23  0608 11/08/23  0822   SODIUM mmol/L 138 139 141   POTASSIUM mmol/L 3.8 4.0 4.2   CHLORIDE mmol/L 105 109* 106   CO2 mmol/L 25.0 23.1 25.0   BUN mg/dL 19 27* 28*   CREATININE mg/dL 1.17 1.20 1.22   GLUCOSE mg/dL 90 96 121*   EGFR mL/min/1.73 63.0 61.1 59.9*     Results from last 7 days   Lab Units 11/10/23  0610 11/09/23  0608 11/08/23  0822   ALBUMIN g/dL 3.3* 3.3* 4.3   BILIRUBIN mg/dL 1.0 0.7 0.7   ALK PHOS U/L 59 58 73   AST (SGOT) U/L 40 38 50*   ALT (SGPT) U/L 16 15 22     Results from last 7 days   Lab Units 11/10/23  0610 11/09/23  0608 11/08/23  0822   CALCIUM mg/dL 8.7 8.8 9.7   ALBUMIN g/dL 3.3* 3.3* 4.3   MAGNESIUM mg/dL  --   --  2.0     Results from last 7 days   Lab Units 11/10/23  0610 11/08/23  1043   PROCALCITONIN ng/mL 0.12 0.11     No results found for: \"HGBA1C\", \"POCGLU\"    No radiology results for the last day    I have personally reviewed all medications:  Scheduled Medications  enoxaparin, 40 mg, Subcutaneous, Q24H  hydrocortisone-bacitracin-zinc oxide-nystatin, 1 application , Topical, BID  Nirmatrelvir&Ritonavir 300/100, 3 tablet, Oral, BID  senna-docusate sodium, 2 tablet, Oral, BID  sodium chloride, 10 mL, Intravenous, Q12H    Infusions  lactated ringers, 125 mL/hr, Last Rate: 125 mL/hr (11/10/23 0210)    Diet  Diet: Regular/House Diet; Texture: Regular Texture (IDDSI 7); Fluid Consistency: Thin (IDDSI 0)    I have personally reviewed:  [x]  Laboratory   [x]  Microbiology   []  Radiology   [x]  EKG/Telemetry  []  Cardiology/Vascular   []  Pathology    []  Records    Assessment/Plan     Active Hospital Problems    Diagnosis  POA    **Pneumonia due to COVID-19 virus [U07.1, J12.82]  Yes    Acute encephalopathy [G93.40]  Yes    Non-traumatic rhabdomyolysis [M62.82]  Yes    Stage 3a chronic kidney disease [N18.31]  Yes    Metabolic encephalopathy [G93.41]  Yes    Gastroesophageal " reflux disease without esophagitis [K21.9]  Yes    Hyperlipidemia [E78.5]  Yes      Resolved Hospital Problems   No resolved problems to display.   Non-Traumatic Rhabdomyolysis  - from prolonged time on his floor, no evidence of compartment syndrome or pigment nephropathy  - continue IV fluids but will reduce rate  - monitor renal function, electrolytes, and CK levels     COVID-19 Pneumonia  - no evidence of bacterial pneumonia  - not hypoxic, therefore no steroids indicated  - continue paxlovid  - monitor inflammatory markers, LFTs, renal function, D-dimer, and procalcitonin     Stage 3a CKD  - stable  - will monitor     Metabolic Encephalopathy  - superimposed on known dementia, likely due to COVID-19  - improved on above treatment    Lovenox 40 mg SC daily for DVT prophylaxis.  Full code.  Discussed with patient and nursing staff.  Anticipate discharge to SNU facility in 1-2 days.      Frandy Linn MD  [facility]  11/10/23  17:44 EST    Portions of this text have been copied and I have reviewed these. They are accurate as of 11/10/2023

## 2023-11-10 NOTE — PLAN OF CARE
Goal Outcome Evaluation:  Plan of Care Reviewed With: patient           Outcome Evaluation: RN agreeable to re-eval, says pt is more awake/alert today. Pt agreeable to re-eval. He accepted PO trials of water via tsp, water via straw, nectar via straw, puree, and solid. Timely and adequate mastication of regular solid, no oral residue. No overt s/s of aspiration with any consistency. x1 delayed cough inbetween trials of thin, however do not suspect d/t PO intake. Recommend regular diet and thin liquid. Meds whole as able. Strict supervision and feeding assistance during meals. RN and pt verbalized understanding.

## 2023-11-11 LAB
ALBUMIN SERPL-MCNC: 3.6 G/DL (ref 3.5–5.2)
ALBUMIN/GLOB SERPL: 1.2 G/DL
ALP SERPL-CCNC: 62 U/L (ref 39–117)
ALT SERPL W P-5'-P-CCNC: 20 U/L (ref 1–41)
ANION GAP SERPL CALCULATED.3IONS-SCNC: 10.7 MMOL/L (ref 5–15)
AST SERPL-CCNC: 38 U/L (ref 1–40)
BASOPHILS # BLD AUTO: 0.01 10*3/MM3 (ref 0–0.2)
BASOPHILS NFR BLD AUTO: 0.2 % (ref 0–1.5)
BILIRUB SERPL-MCNC: 1 MG/DL (ref 0–1.2)
BUN SERPL-MCNC: 15 MG/DL (ref 8–23)
BUN/CREAT SERPL: 14.7 (ref 7–25)
CALCIUM SPEC-SCNC: 9 MG/DL (ref 8.6–10.5)
CHLORIDE SERPL-SCNC: 105 MMOL/L (ref 98–107)
CK SERPL-CCNC: 828 U/L (ref 20–200)
CO2 SERPL-SCNC: 25.3 MMOL/L (ref 22–29)
CREAT SERPL-MCNC: 1.02 MG/DL (ref 0.76–1.27)
CRP SERPL-MCNC: 8.01 MG/DL (ref 0–0.5)
DEPRECATED RDW RBC AUTO: 43 FL (ref 37–54)
EGFRCR SERPLBLD CKD-EPI 2021: 74.3 ML/MIN/1.73
EOSINOPHIL # BLD AUTO: 0.07 10*3/MM3 (ref 0–0.4)
EOSINOPHIL NFR BLD AUTO: 1.5 % (ref 0.3–6.2)
ERYTHROCYTE [DISTWIDTH] IN BLOOD BY AUTOMATED COUNT: 12.7 % (ref 12.3–15.4)
FERRITIN SERPL-MCNC: 494 NG/ML (ref 30–400)
GLOBULIN UR ELPH-MCNC: 2.9 GM/DL
GLUCOSE SERPL-MCNC: 101 MG/DL (ref 65–99)
HCT VFR BLD AUTO: 35.8 % (ref 37.5–51)
HGB BLD-MCNC: 12.4 G/DL (ref 13–17.7)
IMM GRANULOCYTES # BLD AUTO: 0.02 10*3/MM3 (ref 0–0.05)
IMM GRANULOCYTES NFR BLD AUTO: 0.4 % (ref 0–0.5)
LYMPHOCYTES # BLD AUTO: 0.9 10*3/MM3 (ref 0.7–3.1)
LYMPHOCYTES NFR BLD AUTO: 19.4 % (ref 19.6–45.3)
MCH RBC QN AUTO: 32.1 PG (ref 26.6–33)
MCHC RBC AUTO-ENTMCNC: 34.6 G/DL (ref 31.5–35.7)
MCV RBC AUTO: 92.7 FL (ref 79–97)
MONOCYTES # BLD AUTO: 0.56 10*3/MM3 (ref 0.1–0.9)
MONOCYTES NFR BLD AUTO: 12 % (ref 5–12)
NEUTROPHILS NFR BLD AUTO: 3.09 10*3/MM3 (ref 1.7–7)
NEUTROPHILS NFR BLD AUTO: 66.5 % (ref 42.7–76)
NRBC BLD AUTO-RTO: 0 /100 WBC (ref 0–0.2)
PLATELET # BLD AUTO: 208 10*3/MM3 (ref 140–450)
PMV BLD AUTO: 10 FL (ref 6–12)
POTASSIUM SERPL-SCNC: 3.5 MMOL/L (ref 3.5–5.2)
PROT SERPL-MCNC: 6.5 G/DL (ref 6–8.5)
RBC # BLD AUTO: 3.86 10*6/MM3 (ref 4.14–5.8)
SODIUM SERPL-SCNC: 141 MMOL/L (ref 136–145)
WBC NRBC COR # BLD: 4.65 10*3/MM3 (ref 3.4–10.8)

## 2023-11-11 PROCEDURE — 85025 COMPLETE CBC W/AUTO DIFF WBC: CPT | Performed by: INTERNAL MEDICINE

## 2023-11-11 PROCEDURE — 25010000002 ENOXAPARIN PER 10 MG: Performed by: INTERNAL MEDICINE

## 2023-11-11 PROCEDURE — 82728 ASSAY OF FERRITIN: CPT | Performed by: INTERNAL MEDICINE

## 2023-11-11 PROCEDURE — 25810000003 LACTATED RINGERS PER 1000 ML: Performed by: INTERNAL MEDICINE

## 2023-11-11 PROCEDURE — 86140 C-REACTIVE PROTEIN: CPT | Performed by: INTERNAL MEDICINE

## 2023-11-11 PROCEDURE — 82550 ASSAY OF CK (CPK): CPT | Performed by: INTERNAL MEDICINE

## 2023-11-11 PROCEDURE — 80053 COMPREHEN METABOLIC PANEL: CPT | Performed by: INTERNAL MEDICINE

## 2023-11-11 RX ADMIN — SENNOSIDES AND DOCUSATE SODIUM 2 TABLET: 50; 8.6 TABLET ORAL at 22:39

## 2023-11-11 RX ADMIN — ZINC OXIDE 1 APPLICATION: 200 OINTMENT TOPICAL at 09:26

## 2023-11-11 RX ADMIN — NIRMATRELVIR AND RITONAVIR 3 TABLET: KIT at 22:39

## 2023-11-11 RX ADMIN — Medication 10 ML: at 22:40

## 2023-11-11 RX ADMIN — NIRMATRELVIR AND RITONAVIR 3 TABLET: KIT at 09:21

## 2023-11-11 RX ADMIN — ZINC OXIDE 1 APPLICATION: 200 OINTMENT TOPICAL at 22:40

## 2023-11-11 RX ADMIN — SODIUM CHLORIDE, POTASSIUM CHLORIDE, SODIUM LACTATE AND CALCIUM CHLORIDE 75 ML/HR: 600; 310; 30; 20 INJECTION, SOLUTION INTRAVENOUS at 13:48

## 2023-11-11 RX ADMIN — ENOXAPARIN SODIUM 40 MG: 100 INJECTION SUBCUTANEOUS at 13:48

## 2023-11-11 NOTE — PROGRESS NOTES
Name: Jake Kan ADMIT: 2023   : 1945  PCP: Sree Thomas MD    MRN: [id number] LOS: 3 days   AGE/SEX: 78 y.o. male  ROOM: [facility]     Subjective   Subjective   Patient seen and examined this morning.  Hospital day 3.  At time of my examination, patient is awake, alert, resting comfortably in bed.  He remains confused, however, appears to be near baseline.  He has no acute complaints at this time.  No acute events overnight.       Objective   Objective   Vital Signs  Temp:  [97.3 °F (36.3 °C)-98.4 °F (36.9 °C)] 97.3 °F (36.3 °C)  Heart Rate:  [56-64] 56  Resp:  [16-18] 16  BP: (156-169)/(69-88) 156/81  SpO2:  [95 %-97 %] 97 %  on   ;   Device (Oxygen Therapy): room air  Body mass index is 22.81 kg/m².  Physical Exam  Vitals and nursing note reviewed.   Constitutional:       General: He is awake. He is not in acute distress.  Cardiovascular:      Rate and Rhythm: Normal rate and regular rhythm.      Pulses: Normal pulses.      Heart sounds: Normal heart sounds.   Pulmonary:      Effort: Pulmonary effort is normal. No respiratory distress.      Breath sounds: Normal breath sounds. Decreased breath sounds present.      Comments: On supplemental O2  Abdominal:      General: Bowel sounds are normal.      Palpations: Abdomen is soft.      Tenderness: There is no abdominal tenderness.   Skin:     General: Skin is warm and dry.   Neurological:      Mental Status: He is alert. Mental status is at baseline. He is confused.       Results Review     I reviewed the patient's new clinical results.  Results from last 7 days   Lab Units 23  0847 11/10/23  0610 23  0608 23  0822   WBC 10*3/mm3 4.65 4.89 5.81 7.97   HEMOGLOBIN g/dL 12.4* 12.0* 12.1* 13.9   PLATELETS 10*3/mm3 208 195 190 210     Results from last 7 days   Lab Units 23  0847 11/10/23  0610 23  0608 23  0822   SODIUM mmol/L 141 138 139 141   POTASSIUM mmol/L 3.5 3.8 4.0 4.2   CHLORIDE mmol/L 105 105 109* 106  "  CO2 mmol/L 25.3 25.0 23.1 25.0   BUN mg/dL 15 19 27* 28*   CREATININE mg/dL 1.02 1.17 1.20 1.22   GLUCOSE mg/dL 101* 90 96 121*   EGFR mL/min/1.73 74.3 63.0 61.1 59.9*     Results from last 7 days   Lab Units 11/10/23  0610 11/09/23  0608 11/08/23  0822   ALBUMIN g/dL 3.3* 3.3* 4.3   BILIRUBIN mg/dL 1.0 0.7 0.7   ALK PHOS U/L 59 58 73   AST (SGOT) U/L 40 38 50*   ALT (SGPT) U/L 16 15 22     Results from last 7 days   Lab Units 11/11/23  0847 11/10/23  0610 11/09/23  0608 11/08/23  0822   CALCIUM mg/dL 9.0 8.7 8.8 9.7   ALBUMIN g/dL 3.6 3.3* 3.3* 4.3   MAGNESIUM mg/dL  --   --   --  2.0     Results from last 7 days   Lab Units 11/10/23  0610 11/08/23  1043   PROCALCITONIN ng/mL 0.12 0.11     No results found for: \"HGBA1C\", \"POCGLU\"    No radiology results for the last day    I have personally reviewed all medications:  Scheduled Medications  enoxaparin, 40 mg, Subcutaneous, Q24H  hydrocortisone-bacitracin-zinc oxide-nystatin, 1 application , Topical, BID  Nirmatrelvir&Ritonavir 300/100, 3 tablet, Oral, BID  senna-docusate sodium, 2 tablet, Oral, BID  sodium chloride, 10 mL, Intravenous, Q12H    Infusions  lactated ringers, 75 mL/hr, Last Rate: 75 mL/hr (11/10/23 2232)    Diet  Diet: Regular/House Diet; Texture: Regular Texture (IDDSI 7); Fluid Consistency: Thin (IDDSI 0)    I have personally reviewed:  [x]  Laboratory   [x]  Microbiology   [x]  Radiology   [x]  EKG/Telemetry  [x]  Cardiology/Vascular   []  Pathology    []  Records       Assessment/Plan     Active Hospital Problems    Diagnosis  POA    **Pneumonia due to COVID-19 virus [U07.1, J12.82]  Yes    Acute encephalopathy [G93.40]  Yes    Non-traumatic rhabdomyolysis [M62.82]  Yes    Stage 3a chronic kidney disease [N18.31]  Yes    Metabolic encephalopathy [G93.41]  Yes    Gastroesophageal reflux disease without esophagitis [K21.9]  Yes    Hyperlipidemia [E78.5]  Yes      Resolved Hospital Problems   No resolved problems to display.       78 y.o. male " admitted with Pneumonia due to COVID-19 virus.    Non-Traumatic Rhabdomyolysis  - Etiology secondary to prolonged time on his floor, no evidence of compartment syndrome or pigment nephropathy. Receiving IVF, CK levels remain elevated but improving from prior.  - Continue IVF as prescribed.   - Order repeat CMP in AM for reassessment, monitor renal function, electrolytes, and CK levels     COVID-19 Pneumonia  - Diagnosis noted on arrival. No evidence of bacterial pneumonia, patient not hypoxic so treatment with steroids not indicated. Currently on Paxlovid, tolerating well.   - Continue Paxlovid as prescribed.   - Monitor inflammatory markers, LFTs, renal function, D-dimer, and procalcitonin    Chronic kidney disease stage 3A  - On most recent labs, patient's creatinine found to be stable when compared to prior.  - No indications to warrant acute changes and/or intervention at this time.  - Order repeat CMP in AM for reassessment of renal function.   - Will continue to monitor and trend creatinine to guide ongoing management decisions. Avoid nephrotoxic medications, IVF, strict I/O, daily weights.    Anemia  - Hemoglobin low on most recent labs, however, stable from prior. No evidence of overt blood loss. No indications for acute intervention at this time.    - Order repeat CBC in AM for reassessment. Continue to monitor, transfuse for hemoglobin <7.     Metabolic Encephalopathy  Dementia  - Worsening mental status changes noted on arrival, now improving with treatment for contributing etiologies.   - Continue to monitor. Delirium/aspiration/fall precautions.    Debility/impairment in physical status  - PT/OT recommending SNF, discussed with nurse who is going to speak to CCP to begin this process.    All workup, problems and plans new to me today. First day taking care of patient.      Lovenox 40 mg SC daily for DVT prophylaxis.  Full code.  Discussed with patient and nursing staff.  Anticipate discharge to SNU  facility once arrangements have been made.      Leonel Terry DO  [facility]  11/11/23  08:52 EST

## 2023-11-11 NOTE — DISCHARGE PLACEMENT REQUEST
"Young Kan (78 y.o. Male)       Date of Birth   1945    Social Security Number       Address   [address] [place] [address] [place] [facility]    Home Phone   [phone]    MRN   [id number]       Jain   None    Marital Status   Single                            Admission Date   11/8/23    Admission Type   Emergency    Admitting Provider   Frandy Linn MD    Attending Provider   Leonel Terry DO    Department, Room/Bed   [address], [address]       Discharge Date       Discharge Disposition       Discharge Destination                                 Attending Provider: Leonel Terry DO    Allergies: No Known Allergies    Isolation: Enh Drop/Con   Infection: COVID (confirmed) (11/08/23)   Code Status: CPR    Ht: 180.3 cm (71\")   Wt: 74.2 kg (163 lb 9.3 oz)    Admission Cmt: None   Principal Problem: Pneumonia due to COVID-19 virus [U07.1,J12.82]                   Active Insurance as of 11/8/2023       Primary Coverage       Payor Plan Insurance Group Employer/Plan Group    MEDICARE MEDICARE A & B        Payor Plan Address Payor Plan Phone Number Payor Plan Fax Number Effective Dates    [address] [phone]  3/1/2008 - None Entered    [place] [address]         Subscriber Name Subscriber Birth Date Member ID       YOUNG KAN 1945 [id number]                     Emergency Contacts        (Rel.) Home Phone Work Phone Mobile Phone    Kary Charles (Other) [phone] -- [phone]                "

## 2023-11-11 NOTE — PLAN OF CARE
Goal Outcome Evaluation:      Pt alert to self. Confused. On RA. SR/SB w/ non-sustained SVT on tele. No c/o pain. Nursing will continue to monitor.

## 2023-11-12 LAB
ALBUMIN SERPL-MCNC: 3.4 G/DL (ref 3.5–5.2)
ALBUMIN/GLOB SERPL: 1.3 G/DL
ALP SERPL-CCNC: 59 U/L (ref 39–117)
ALT SERPL W P-5'-P-CCNC: 17 U/L (ref 1–41)
ANION GAP SERPL CALCULATED.3IONS-SCNC: 9 MMOL/L (ref 5–15)
AST SERPL-CCNC: 25 U/L (ref 1–40)
BASOPHILS # BLD AUTO: 0.01 10*3/MM3 (ref 0–0.2)
BASOPHILS NFR BLD AUTO: 0.2 % (ref 0–1.5)
BILIRUB SERPL-MCNC: 1 MG/DL (ref 0–1.2)
BUN SERPL-MCNC: 20 MG/DL (ref 8–23)
BUN/CREAT SERPL: 16.8 (ref 7–25)
CALCIUM SPEC-SCNC: 8.9 MG/DL (ref 8.6–10.5)
CHLORIDE SERPL-SCNC: 104 MMOL/L (ref 98–107)
CK SERPL-CCNC: 390 U/L (ref 20–200)
CO2 SERPL-SCNC: 27 MMOL/L (ref 22–29)
CREAT SERPL-MCNC: 1.19 MG/DL (ref 0.76–1.27)
CRP SERPL-MCNC: 9.43 MG/DL (ref 0–0.5)
D DIMER PPP FEU-MCNC: 1.57 MCGFEU/ML (ref 0–0.8)
DEPRECATED RDW RBC AUTO: 45.1 FL (ref 37–54)
EGFRCR SERPLBLD CKD-EPI 2021: 61.8 ML/MIN/1.73
EOSINOPHIL # BLD AUTO: 0.09 10*3/MM3 (ref 0–0.4)
EOSINOPHIL NFR BLD AUTO: 1.4 % (ref 0.3–6.2)
ERYTHROCYTE [DISTWIDTH] IN BLOOD BY AUTOMATED COUNT: 13 % (ref 12.3–15.4)
FERRITIN SERPL-MCNC: 506 NG/ML (ref 30–400)
GLOBULIN UR ELPH-MCNC: 2.7 GM/DL
GLUCOSE SERPL-MCNC: 98 MG/DL (ref 65–99)
HCT VFR BLD AUTO: 35.9 % (ref 37.5–51)
HGB BLD-MCNC: 12 G/DL (ref 13–17.7)
IMM GRANULOCYTES # BLD AUTO: 0.02 10*3/MM3 (ref 0–0.05)
IMM GRANULOCYTES NFR BLD AUTO: 0.3 % (ref 0–0.5)
LYMPHOCYTES # BLD AUTO: 1.34 10*3/MM3 (ref 0.7–3.1)
LYMPHOCYTES NFR BLD AUTO: 20.2 % (ref 19.6–45.3)
MCH RBC QN AUTO: 31.5 PG (ref 26.6–33)
MCHC RBC AUTO-ENTMCNC: 33.4 G/DL (ref 31.5–35.7)
MCV RBC AUTO: 94.2 FL (ref 79–97)
MONOCYTES # BLD AUTO: 0.54 10*3/MM3 (ref 0.1–0.9)
MONOCYTES NFR BLD AUTO: 8.1 % (ref 5–12)
NEUTROPHILS NFR BLD AUTO: 4.63 10*3/MM3 (ref 1.7–7)
NEUTROPHILS NFR BLD AUTO: 69.8 % (ref 42.7–76)
NRBC BLD AUTO-RTO: 0 /100 WBC (ref 0–0.2)
PLATELET # BLD AUTO: 211 10*3/MM3 (ref 140–450)
PMV BLD AUTO: 9.8 FL (ref 6–12)
POTASSIUM SERPL-SCNC: 4.2 MMOL/L (ref 3.5–5.2)
PROCALCITONIN SERPL-MCNC: 0.07 NG/ML (ref 0–0.25)
PROT SERPL-MCNC: 6.1 G/DL (ref 6–8.5)
RBC # BLD AUTO: 3.81 10*6/MM3 (ref 4.14–5.8)
SODIUM SERPL-SCNC: 140 MMOL/L (ref 136–145)
WBC NRBC COR # BLD: 6.63 10*3/MM3 (ref 3.4–10.8)

## 2023-11-12 PROCEDURE — 85025 COMPLETE CBC W/AUTO DIFF WBC: CPT | Performed by: INTERNAL MEDICINE

## 2023-11-12 PROCEDURE — 84145 PROCALCITONIN (PCT): CPT | Performed by: INTERNAL MEDICINE

## 2023-11-12 PROCEDURE — 86140 C-REACTIVE PROTEIN: CPT | Performed by: INTERNAL MEDICINE

## 2023-11-12 PROCEDURE — 25810000003 LACTATED RINGERS PER 1000 ML: Performed by: INTERNAL MEDICINE

## 2023-11-12 PROCEDURE — 80053 COMPREHEN METABOLIC PANEL: CPT | Performed by: INTERNAL MEDICINE

## 2023-11-12 PROCEDURE — 82728 ASSAY OF FERRITIN: CPT | Performed by: INTERNAL MEDICINE

## 2023-11-12 PROCEDURE — 82550 ASSAY OF CK (CPK): CPT | Performed by: INTERNAL MEDICINE

## 2023-11-12 PROCEDURE — 85379 FIBRIN DEGRADATION QUANT: CPT | Performed by: INTERNAL MEDICINE

## 2023-11-12 RX ADMIN — Medication 10 ML: at 20:22

## 2023-11-12 RX ADMIN — NIRMATRELVIR AND RITONAVIR 3 TABLET: KIT at 08:08

## 2023-11-12 RX ADMIN — SENNOSIDES AND DOCUSATE SODIUM 2 TABLET: 50; 8.6 TABLET ORAL at 20:21

## 2023-11-12 RX ADMIN — ZINC OXIDE 1 APPLICATION: 200 OINTMENT TOPICAL at 08:09

## 2023-11-12 RX ADMIN — SODIUM CHLORIDE, POTASSIUM CHLORIDE, SODIUM LACTATE AND CALCIUM CHLORIDE 75 ML/HR: 600; 310; 30; 20 INJECTION, SOLUTION INTRAVENOUS at 20:29

## 2023-11-12 RX ADMIN — NIRMATRELVIR AND RITONAVIR 3 TABLET: KIT at 20:21

## 2023-11-12 RX ADMIN — ZINC OXIDE 1 APPLICATION: 200 OINTMENT TOPICAL at 20:21

## 2023-11-12 NOTE — PLAN OF CARE
Goal Outcome Evaluation:      Pt resting in bed at this time. VSS. Pt has dry cough. NSR w/bbb on tele. On RA. No acute distress noted.

## 2023-11-12 NOTE — PROGRESS NOTES
Name: Jake Kan ADMIT: 2023   : 1945  PCP: Sree Thomas MD    MRN: [id number] LOS: 4 days   AGE/SEX: 78 y.o. male  ROOM: [facility]     Subjective   Subjective   Patient seen and examined this morning.  Hospital day 4.  At time of my examination, patient is awake, alert, resting comfortably in bed.  He remains confused, however, appears to be near baseline.  Respiratory status appears stable, currently on room air.  No acute events overnight.  Discussed with nursing, CCP currently trying to contact patient's POA to discuss rehab options.     Objective   Objective   Vital Signs  Temp:  [97.9 °F (36.6 °C)-98.2 °F (36.8 °C)] 98.2 °F (36.8 °C)  Heart Rate:  [61-77] 65  Resp:  [16] 16  BP: (142-159)/(72-81) 145/72  SpO2:  [92 %-100 %] 97 %  on   ;   Device (Oxygen Therapy): room air  Body mass index is 22.81 kg/m².  Physical Exam  Vitals and nursing note reviewed.   Constitutional:       General: He is awake. He is not in acute distress.     Comments: Elderly, chronically ill-appearing   Cardiovascular:      Rate and Rhythm: Normal rate and regular rhythm.      Pulses: Normal pulses.      Heart sounds: Normal heart sounds.   Pulmonary:      Effort: Pulmonary effort is normal. No respiratory distress.      Breath sounds: Decreased breath sounds present.      Comments: On room air  Abdominal:      General: Bowel sounds are normal.      Palpations: Abdomen is soft.      Tenderness: There is no abdominal tenderness.   Skin:     General: Skin is warm and dry.   Neurological:      Mental Status: He is alert. Mental status is at baseline. He is confused.       Results Review     I reviewed the patient's new clinical results.  Results from last 7 days   Lab Units 23  0557 23  0847 11/10/23  0610 23  0608   WBC 10*3/mm3 6.63 4.65 4.89 5.81   HEMOGLOBIN g/dL 12.0* 12.4* 12.0* 12.1*   PLATELETS 10*3/mm3 211 208 195 190     Results from last 7 days   Lab Units 2357 23  0847  "11/10/23  0610 11/09/23  0608   SODIUM mmol/L 140 141 138 139   POTASSIUM mmol/L 4.2 3.5 3.8 4.0   CHLORIDE mmol/L 104 105 105 109*   CO2 mmol/L 27.0 25.3 25.0 23.1   BUN mg/dL 20 15 19 27*   CREATININE mg/dL 1.19 1.02 1.17 1.20   GLUCOSE mg/dL 98 101* 90 96   EGFR mL/min/1.73 61.8 74.3 63.0 61.1     Results from last 7 days   Lab Units 11/12/23  0557 11/11/23  0847 11/10/23  0610 11/09/23  0608   ALBUMIN g/dL 3.4* 3.6 3.3* 3.3*   BILIRUBIN mg/dL 1.0 1.0 1.0 0.7   ALK PHOS U/L 59 62 59 58   AST (SGOT) U/L 25 38 40 38   ALT (SGPT) U/L 17 20 16 15     Results from last 7 days   Lab Units 11/12/23  0557 11/11/23  0847 11/10/23  0610 11/09/23  0608 11/08/23  0822   CALCIUM mg/dL 8.9 9.0 8.7 8.8 9.7   ALBUMIN g/dL 3.4* 3.6 3.3* 3.3* 4.3   MAGNESIUM mg/dL  --   --   --   --  2.0     Results from last 7 days   Lab Units 11/12/23  0557 11/10/23  0610 11/08/23  1043   PROCALCITONIN ng/mL 0.07 0.12 0.11     No results found for: \"HGBA1C\", \"POCGLU\"    No radiology results for the last day    I have personally reviewed all medications:  Scheduled Medications  enoxaparin, 40 mg, Subcutaneous, Q24H  hydrocortisone-bacitracin-zinc oxide-nystatin, 1 application , Topical, BID  Nirmatrelvir&Ritonavir 300/100, 3 tablet, Oral, BID  senna-docusate sodium, 2 tablet, Oral, BID  sodium chloride, 10 mL, Intravenous, Q12H    Infusions  lactated ringers, 75 mL/hr, Last Rate: 75 mL/hr (11/11/23 1348)    Diet  Diet: Regular/House Diet; Texture: Regular Texture (IDDSI 7); Fluid Consistency: Thin (IDDSI 0)    I have personally reviewed:  [x]  Laboratory   [x]  Microbiology   [x]  Radiology   [x]  EKG/Telemetry  [x]  Cardiology/Vascular   []  Pathology    []  Records       Assessment/Plan     Active Hospital Problems    Diagnosis  POA    **Pneumonia due to COVID-19 virus [U07.1, J12.82]  Yes    Acute encephalopathy [G93.40]  Yes    Non-traumatic rhabdomyolysis [M62.82]  Yes    Stage 3a chronic kidney disease [N18.31]  Yes    Metabolic " encephalopathy [G93.41]  Yes    Gastroesophageal reflux disease without esophagitis [K21.9]  Yes    Hyperlipidemia [E78.5]  Yes      Resolved Hospital Problems   No resolved problems to display.       78 y.o. male admitted with Pneumonia due to COVID-19 virus.    Non-Traumatic Rhabdomyolysis  - Etiology secondary to prolonged time on his floor, no evidence of compartment syndrome or pigment nephropathy. Receiving IVF, CK levels remain elevated but improving from prior.  - Continue IVF as prescribed.   - Order repeat CMP in AM for reassessment, monitor renal function, electrolytes, and CK levels     COVID-19 Pneumonia  - Diagnosis noted on arrival. No evidence of bacterial pneumonia, patient not hypoxic so treatment with steroids not indicated. Currently on Paxlovid, tolerating well.   - Continue Paxlovid as prescribed.   - Monitor inflammatory markers, LFTs, renal function, D-dimer, and procalcitonin    Chronic kidney disease stage 3A  - On most recent labs, patient's creatinine found to be stable when compared to prior.  - No indications to warrant acute changes and/or intervention at this time.  - Order repeat CMP in AM for reassessment of renal function.   - Will continue to monitor and trend creatinine to guide ongoing management decisions. Avoid nephrotoxic medications, IVF, strict I/O, daily weights.    Anemia  - Hemoglobin low on most recent labs, however, stable from prior. No evidence of overt blood loss. No indications for acute intervention at this time.    - Order repeat CBC in AM for reassessment. Continue to monitor, transfuse for hemoglobin <7.     Metabolic Encephalopathy  Dementia  - Worsening mental status changes noted on arrival, now improving with treatment for contributing etiologies. Remains confused, but appears to be near baseline at present.  - Continue to monitor. Delirium/aspiration/fall precautions.    Debility/impairment in physical status  - PT/OT recommending SNF, discussed with nurse  who is going to speak to CCP to begin this process.      Lovenox 40 mg SC daily for DVT prophylaxis.  Full code.  Discussed with patient and nursing staff.  Anticipate discharge to SNU facility once arrangements have been made.      Leonel Terry DO  [facility]  11/12/23

## 2023-11-13 LAB
ALBUMIN SERPL-MCNC: 3.2 G/DL (ref 3.5–5.2)
ALBUMIN/GLOB SERPL: 1 G/DL
ALP SERPL-CCNC: 60 U/L (ref 39–117)
ALT SERPL W P-5'-P-CCNC: 14 U/L (ref 1–41)
ANION GAP SERPL CALCULATED.3IONS-SCNC: 8.1 MMOL/L (ref 5–15)
AST SERPL-CCNC: 21 U/L (ref 1–40)
BASOPHILS # BLD AUTO: 0.02 10*3/MM3 (ref 0–0.2)
BASOPHILS NFR BLD AUTO: 0.4 % (ref 0–1.5)
BILIRUB SERPL-MCNC: 0.7 MG/DL (ref 0–1.2)
BUN SERPL-MCNC: 19 MG/DL (ref 8–23)
BUN/CREAT SERPL: 18.4 (ref 7–25)
CALCIUM SPEC-SCNC: 8.8 MG/DL (ref 8.6–10.5)
CHLORIDE SERPL-SCNC: 104 MMOL/L (ref 98–107)
CK SERPL-CCNC: 206 U/L (ref 20–200)
CO2 SERPL-SCNC: 27.9 MMOL/L (ref 22–29)
CREAT SERPL-MCNC: 1.03 MG/DL (ref 0.76–1.27)
CRP SERPL-MCNC: 9.13 MG/DL (ref 0–0.5)
DEPRECATED RDW RBC AUTO: 42.3 FL (ref 37–54)
EGFRCR SERPLBLD CKD-EPI 2021: 73.4 ML/MIN/1.73
EOSINOPHIL # BLD AUTO: 0.21 10*3/MM3 (ref 0–0.4)
EOSINOPHIL NFR BLD AUTO: 4 % (ref 0.3–6.2)
ERYTHROCYTE [DISTWIDTH] IN BLOOD BY AUTOMATED COUNT: 12.6 % (ref 12.3–15.4)
FERRITIN SERPL-MCNC: 527 NG/ML (ref 30–400)
GLOBULIN UR ELPH-MCNC: 3.1 GM/DL
GLUCOSE SERPL-MCNC: 103 MG/DL (ref 65–99)
HCT VFR BLD AUTO: 35.3 % (ref 37.5–51)
HGB BLD-MCNC: 12.2 G/DL (ref 13–17.7)
IMM GRANULOCYTES # BLD AUTO: 0.04 10*3/MM3 (ref 0–0.05)
IMM GRANULOCYTES NFR BLD AUTO: 0.8 % (ref 0–0.5)
LYMPHOCYTES # BLD AUTO: 1.32 10*3/MM3 (ref 0.7–3.1)
LYMPHOCYTES NFR BLD AUTO: 25.1 % (ref 19.6–45.3)
MCH RBC QN AUTO: 32 PG (ref 26.6–33)
MCHC RBC AUTO-ENTMCNC: 34.6 G/DL (ref 31.5–35.7)
MCV RBC AUTO: 92.7 FL (ref 79–97)
MONOCYTES # BLD AUTO: 0.47 10*3/MM3 (ref 0.1–0.9)
MONOCYTES NFR BLD AUTO: 8.9 % (ref 5–12)
NEUTROPHILS NFR BLD AUTO: 3.2 10*3/MM3 (ref 1.7–7)
NEUTROPHILS NFR BLD AUTO: 60.8 % (ref 42.7–76)
NRBC BLD AUTO-RTO: 0 /100 WBC (ref 0–0.2)
PLATELET # BLD AUTO: 230 10*3/MM3 (ref 140–450)
PMV BLD AUTO: 10 FL (ref 6–12)
POTASSIUM SERPL-SCNC: 3.7 MMOL/L (ref 3.5–5.2)
PROT SERPL-MCNC: 6.3 G/DL (ref 6–8.5)
RBC # BLD AUTO: 3.81 10*6/MM3 (ref 4.14–5.8)
SODIUM SERPL-SCNC: 140 MMOL/L (ref 136–145)
WBC NRBC COR # BLD: 5.26 10*3/MM3 (ref 3.4–10.8)

## 2023-11-13 PROCEDURE — 85025 COMPLETE CBC W/AUTO DIFF WBC: CPT | Performed by: INTERNAL MEDICINE

## 2023-11-13 PROCEDURE — 97530 THERAPEUTIC ACTIVITIES: CPT

## 2023-11-13 PROCEDURE — 80053 COMPREHEN METABOLIC PANEL: CPT | Performed by: INTERNAL MEDICINE

## 2023-11-13 PROCEDURE — 86140 C-REACTIVE PROTEIN: CPT | Performed by: INTERNAL MEDICINE

## 2023-11-13 PROCEDURE — 25010000002 ENOXAPARIN PER 10 MG: Performed by: INTERNAL MEDICINE

## 2023-11-13 PROCEDURE — 25810000003 LACTATED RINGERS PER 1000 ML: Performed by: INTERNAL MEDICINE

## 2023-11-13 PROCEDURE — 82550 ASSAY OF CK (CPK): CPT | Performed by: INTERNAL MEDICINE

## 2023-11-13 PROCEDURE — 82728 ASSAY OF FERRITIN: CPT | Performed by: INTERNAL MEDICINE

## 2023-11-13 RX ORDER — AMLODIPINE BESYLATE 5 MG/1
5 TABLET ORAL
Status: DISCONTINUED | OUTPATIENT
Start: 2023-11-13 | End: 2023-11-21 | Stop reason: HOSPADM

## 2023-11-13 RX ORDER — PANTOPRAZOLE SODIUM 40 MG/1
40 TABLET, DELAYED RELEASE ORAL
Status: DISCONTINUED | OUTPATIENT
Start: 2023-11-13 | End: 2023-11-21 | Stop reason: HOSPADM

## 2023-11-13 RX ORDER — DONEPEZIL HYDROCHLORIDE 10 MG/1
10 TABLET, FILM COATED ORAL
Status: DISCONTINUED | OUTPATIENT
Start: 2023-11-13 | End: 2023-11-21 | Stop reason: HOSPADM

## 2023-11-13 RX ADMIN — PANTOPRAZOLE SODIUM 40 MG: 40 TABLET, DELAYED RELEASE ORAL at 09:36

## 2023-11-13 RX ADMIN — SENNOSIDES AND DOCUSATE SODIUM 2 TABLET: 50; 8.6 TABLET ORAL at 09:37

## 2023-11-13 RX ADMIN — Medication 10 ML: at 09:37

## 2023-11-13 RX ADMIN — AMLODIPINE BESYLATE 5 MG: 5 TABLET ORAL at 09:36

## 2023-11-13 RX ADMIN — Medication 10 ML: at 21:03

## 2023-11-13 RX ADMIN — ENOXAPARIN SODIUM 40 MG: 100 INJECTION SUBCUTANEOUS at 16:50

## 2023-11-13 RX ADMIN — SODIUM CHLORIDE, POTASSIUM CHLORIDE, SODIUM LACTATE AND CALCIUM CHLORIDE 75 ML/HR: 600; 310; 30; 20 INJECTION, SOLUTION INTRAVENOUS at 09:44

## 2023-11-13 RX ADMIN — ZINC OXIDE 1 APPLICATION: 200 OINTMENT TOPICAL at 21:02

## 2023-11-13 RX ADMIN — DONEPEZIL HYDROCHLORIDE 10 MG: 10 TABLET, FILM COATED ORAL at 19:30

## 2023-11-13 RX ADMIN — ZINC OXIDE 1 APPLICATION: 200 OINTMENT TOPICAL at 09:37

## 2023-11-13 NOTE — PLAN OF CARE
Goal Outcome Evaluation:  Plan of Care Reviewed With: patient        Progress: improving  Outcome Evaluation: Pt seen by PT this AM for tx. Pt sat up to EOB req mod/max A and max cues for sequencing. Pt stood 5x (EOB, chair) req initially min A and use of fww although improved to CGA and use of fww. Pt amb 1' to chair then 5' fwd/bkwd w/ amb improving from min A to CGA and use of fww. Ther ex was performed in chair for strengthening/ PT will prog as pt marilee.      Anticipated Discharge Disposition (PT): skilled nursing facility

## 2023-11-13 NOTE — PROGRESS NOTES
Name: Jake Kan ADMIT: 2023   : 1945  PCP: Sree Thomas MD    MRN: [id number] LOS: 5 days   AGE/SEX: 78 y.o. male  ROOM: [facility]     Subjective   Subjective   Patient seen and examined this morning.  Respiratory status appears stable, currently on room air.  No acute events overnight.  Patient is alert only to self which is reportedly his baseline.  His blood pressure is elevated.     Objective   Objective   Vital Signs  Temp:  [97.3 °F (36.3 °C)-98.3 °F (36.8 °C)] 97.3 °F (36.3 °C)  Heart Rate:  [57-62] 60  Resp:  [16-18] 16  BP: (121-178)/(64-82) 140/79  SpO2:  [95 %-100 %] 97 %  on   ;   Device (Oxygen Therapy): room air  Body mass index is 22.81 kg/m².  Physical Exam  Vitals and nursing note reviewed.   Constitutional:       General: He is awake. He is not in acute distress.     Comments: Elderly, chronically ill-appearing   Cardiovascular:      Rate and Rhythm: Normal rate and regular rhythm.      Pulses: Normal pulses.      Heart sounds: Normal heart sounds.   Pulmonary:      Effort: Pulmonary effort is normal. No respiratory distress.      Breath sounds: Decreased breath sounds present.      Comments: On room air  Abdominal:      General: Bowel sounds are normal.      Palpations: Abdomen is soft.      Tenderness: There is no abdominal tenderness.   Skin:     General: Skin is warm and dry.   Neurological:      Mental Status: He is alert. Mental status is at baseline. He is confused.       Results Review     I reviewed the patient's new clinical results.  Results from last 7 days   Lab Units 2357 23  0847 11/10/23  0610   WBC 10*3/mm3 5.26 6.63 4.65 4.89   HEMOGLOBIN g/dL 12.2* 12.0* 12.4* 12.0*   PLATELETS 10*3/mm3 230 211 208 195     Results from last 7 days   Lab Units 23  0557 23  0847 11/10/23  0610   SODIUM mmol/L 140 140 141 138   POTASSIUM mmol/L 3.7 4.2 3.5 3.8   CHLORIDE mmol/L 104 104 105 105   CO2 mmol/L 27.9 27.0  "25.3 25.0   BUN mg/dL 19 20 15 19   CREATININE mg/dL 1.03 1.19 1.02 1.17   GLUCOSE mg/dL 103* 98 101* 90   EGFR mL/min/1.73 73.4 61.8 74.3 63.0     Results from last 7 days   Lab Units 11/13/23 0621 11/12/23  0557 11/11/23  0847 11/10/23  0610   ALBUMIN g/dL 3.2* 3.4* 3.6 3.3*   BILIRUBIN mg/dL 0.7 1.0 1.0 1.0   ALK PHOS U/L 60 59 62 59   AST (SGOT) U/L 21 25 38 40   ALT (SGPT) U/L 14 17 20 16     Results from last 7 days   Lab Units 11/13/23 0621 11/12/23 0557 11/11/23  0847 11/10/23  0610 11/09/23  0608 11/08/23  0822   CALCIUM mg/dL 8.8 8.9 9.0 8.7   < > 9.7   ALBUMIN g/dL 3.2* 3.4* 3.6 3.3*   < > 4.3   MAGNESIUM mg/dL  --   --   --   --   --  2.0    < > = values in this interval not displayed.     Results from last 7 days   Lab Units 11/12/23 0557 11/10/23  0610 11/08/23  1043   PROCALCITONIN ng/mL 0.07 0.12 0.11     No results found for: \"HGBA1C\", \"POCGLU\"    No radiology results for the last day    I have personally reviewed all medications:  Scheduled Medications  amLODIPine, 5 mg, Oral, Q24H  donepezil, 10 mg, Oral, Daily With Dinner  enoxaparin, 40 mg, Subcutaneous, Q24H  hydrocortisone-bacitracin-zinc oxide-nystatin, 1 application , Topical, BID  Nirmatrelvir&Ritonavir 300/100, 3 tablet, Oral, BID  pantoprazole, 40 mg, Oral, Q AM  senna-docusate sodium, 2 tablet, Oral, BID  sodium chloride, 10 mL, Intravenous, Q12H    Infusions  lactated ringers, 75 mL/hr, Last Rate: 75 mL/hr (11/13/23 0944)    Diet  Diet: Regular/House Diet; Texture: Regular Texture (IDDSI 7); Fluid Consistency: Thin (IDDSI 0)    I have personally reviewed:  [x]  Laboratory   [x]  Microbiology   [x]  Radiology   [x]  EKG/Telemetry  [x]  Cardiology/Vascular   []  Pathology    [x]  Records       Assessment/Plan     Active Hospital Problems    Diagnosis  POA    **Pneumonia due to COVID-19 virus [U07.1, J12.82]  Yes    Acute encephalopathy [G93.40]  Yes    Non-traumatic rhabdomyolysis [M62.82]  Yes    Stage 3a chronic kidney disease " [N18.31]  Yes    Metabolic encephalopathy [G93.41]  Yes    Gastroesophageal reflux disease without esophagitis [K21.9]  Yes    Hyperlipidemia [E78.5]  Yes      Resolved Hospital Problems   No resolved problems to display.       78 y.o. male admitted with Pneumonia due to COVID-19 virus.    Traumatic Rhabdomyolysis  - Etiology secondary to prolonged time on his floor, no evidence of compartment syndrome or pigment nephropathy  -CK trended down with IVF down to 206, will DC fluids     COVID-19 Pneumonia  - Diagnosis noted on arrival. No evidence of bacterial pneumonia, patient not hypoxic so treatment with steroids not indicated. Currently on Paxlovid, tolerating well.   -Completed Paxlovid    High blood pressure/hypertension  -No previous diagnosis per patient, initiate amlodipine 5 mg    Chronic kidney disease stage 3A  -Creatinine has been stable, continue to trend on daily BMP    Anemia  - Hemoglobin low on most recent labs, however, stable from prior. No evidence of overt blood loss. No indications for acute intervention at this time.    - Order repeat CBC in AM for reassessment. Continue to monitor, transfuse for hemoglobin <7.     Metabolic Encephalopathy  Dementia  - Worsening mental status changes noted on arrival, now improving with treatment for contributing etiologies. Remains confused, but appears to be near baseline at present.  - Continue to monitor. Delirium/aspiration/fall precautions.    Debility/impairment in physical status  - PT/OT recommending SNF, CCP to see    Lovenox 40 mg SC daily for DVT prophylaxis.  Full code.  Discussed with patient and nursing staff.  Anticipate discharge to SNU facility once arrangements have been made.      Merline Ortiz MD  [facility]  11/13/23

## 2023-11-13 NOTE — PLAN OF CARE
Goal Outcome Evaluation:         Pt resting in bed, AO to self , SR/SB on tele, VSS, Asymptomatic.  Room air. Turn q/2. Plan of care is ongoing

## 2023-11-13 NOTE — THERAPY TREATMENT NOTE
Patient Name: Jake Kan  : 1945    MRN: [id number]                              Today's Date: 2023       Admit Date: 2023    Visit Dx:     ICD-10-CM ICD-9-CM   1. Altered mental status, unspecified altered mental status type  R41.82 780.97   2. Non-traumatic rhabdomyolysis  M62.82 728.88   3. Troponin level elevated  R79.89 790.6   4. COVID-19 virus infection  U07.1 079.89     Patient Active Problem List   Diagnosis    Sciatica of right side    Gastroesophageal reflux disease without esophagitis    Abnormal EKG    Hyperlipidemia    Rosacea    Seborrheic dermatitis    Pneumonia due to COVID-19 virus    Metabolic encephalopathy    Stage 3a chronic kidney disease    Cytokine release syndrome, grade 2    Age-related cognitive decline    Acute encephalopathy    Non-traumatic rhabdomyolysis     Past Medical History:   Diagnosis Date    Pneumonia due to COVID-19 virus 2021     Past Surgical History:   Procedure Laterality Date    HERNIA REPAIR      UMBILICAL HERNIA REPAIR N/A 2019    Procedure: excision of umbilical hernia mesh and umbilical hernia repair;  Surgeon: Luan Dowell MD;  Location: [facility] OR;  Service: General    WRIST FRACTURE SURGERY Left       General Information       Row Name 23 1201          Physical Therapy Time and Intention    Document Type therapy note (daily note)  -     Mode of Treatment physical therapy  -       Row Name 23 1201          Cognition    Orientation Status (Cognition) --  -       Row Name 23 1201          Safety Issues, Functional Mobility    Impairments Affecting Function (Mobility) balance;endurance/activity tolerance;strength  -     Comment, Safety Issues/Impairments (Mobility) gt belt and non skid socks donned  -               User Key  (r) = Recorded By, (t) = Taken By, (c) = Cosigned By      Initials Name Provider Type    PH Ivy Horowitz PTA Physical Therapist Assistant                    Mobility       Row Name 11/13/23 1202          Bed Mobility    Bed Mobility supine-sit  -PH     Supine-Sit [place] (Bed Mobility) moderate assist (50% patient effort);maximum assist (25% patient effort);verbal cues;nonverbal cues (demo/gesture)  -PH     Assistive Device (Bed Mobility) bed rails;head of bed elevated;draw sheet  -PH     Comment, (Bed Mobility) cues for initiation and sequencing  -PH       Row Name 11/13/23 1202          Sit-Stand Transfer    Sit-Stand [place] (Transfers) minimum assist (75% patient effort);contact guard;verbal cues;nonverbal cues (demo/gesture)  -PH     Assistive Device (Sit-Stand Transfers) walker, front-wheeled  -PH     Comment, (Sit-Stand Transfer) STS: 1x from EOB; 4x from chair; pt improved from min A for initial stands to CGA. Cues for B hand placement and upright posture  -PH       Row Name 11/13/23 1202          Gait/Stairs (Locomotion)    [place] Level (Gait) contact guard;minimum assist (75% patient effort);verbal cues;nonverbal cues (demo/gesture)  -PH     Assistive Device (Gait) walker, front-wheeled  -PH     Distance in Feet (Gait) 1' to chair then 5' fwd/bkwd  -PH     Deviations/Abnormal Patterns (Gait) elijah decreased;gait speed decreased;stride length decreased  -PH     Bilateral Gait Deviations forward flexed posture;heel strike decreased  -PH     Comment, (Gait/Stairs) pt slow and mildly unsteady w/ no overt LOB  -PH               User Key  (r) = Recorded By, (t) = Taken By, (c) = Cosigned By      Initials Name Provider Type    PH Ivy Horowitz PTA Physical Therapist Assistant                   Obj/Interventions       Row Name 11/13/23 1204          Motor Skills    Therapeutic Exercise other (see comments)  BAP, LAQ, seated march, punches, shldr flexion; x 10-15 reps all  -PH       Row Name 11/13/23 1204          Balance    Balance Assessment sitting static balance;standing static balance  -PH     Static Sitting Balance standby assist  -PH      Static Standing Balance contact guard  -PH     Position/Device Used, Standing Balance walker, front-wheeled  -PH               User Key  (r) = Recorded By, (t) = Taken By, (c) = Cosigned By      Initials Name Provider Type    Ivy Baldwin PTA Physical Therapist Assistant                   Goals/Plan    No documentation.                  Clinical Impression       Row Name 11/13/23 1205          Pain    Pretreatment Pain Rating 0/10 - no pain  -PH     Posttreatment Pain Rating 0/10 - no pain  -PH     Additional Documentation Pain Scale: Numbers Pre/Post-Treatment (Group)  -PH       Row Name 11/13/23 1205          Plan of Care Review    Plan of Care Reviewed With patient  -PH     Progress improving  -PH     Outcome Evaluation Pt seen by PT this AM for tx. Pt sat up to EOB req mod/max A and max cues for sequencing. Pt stood 5x (EOB, chair) req initially min A and use of fww although improved to CGA and use of fww. Pt amb 1' to chair then 5' fwd/bkwd w/ amb improving from min A to CGA and use of fww. Ther ex was performed in chair for strengthening/ PT will prog as pt marilee.  -PH       Row Name 11/13/23 1205          Vital Signs    O2 Delivery Pre Treatment room air  -PH     O2 Delivery Intra Treatment room air  -PH     O2 Delivery Post Treatment room air  -PH       Row Name 11/13/23 1205          Positioning and Restraints    Pre-Treatment Position in bed  -PH     Post Treatment Position chair  -PH     In Chair reclined;call light within reach;encouraged to call for assist;exit alarm on  -PH               User Key  (r) = Recorded By, (t) = Taken By, (c) = Cosigned By      Initials Name Provider Type    Ivy Baldwin PTA Physical Therapist Assistant                   Outcome Measures       Row Name 11/13/23 1207          How much help from another person do you currently need...    Turning from your back to your side while in flat bed without using bedrails? 2  -PH     Moving from lying on  back to sitting on the side of a flat bed without bedrails? 2  -PH     Moving to and from a bed to a chair (including a wheelchair)? 3  -PH     Standing up from a chair using your arms (e.g., wheelchair, bedside chair)? 3  -PH     Climbing 3-5 steps with a railing? 2  -PH     To walk in hospital room? 3  -PH     AM-PAC 6 Clicks Score (PT) 15  -PH     Highest level of mobility 4 --> Transferred to chair/commode  -PH       Row Name 11/13/23 1207          Functional Assessment    Outcome Measure Options AM-PAC 6 Clicks Basic Mobility (PT)  -PH               User Key  (r) = Recorded By, (t) = Taken By, (c) = Cosigned By      Initials Name Provider Type    Ivy Baldwin PTA Physical Therapist Assistant                                 Physical Therapy Education       Title: PT OT SLP Therapies (Done)       Topic: Physical Therapy (Done)       Point: Mobility training (Done)       Learning Progress Summary             Patient Acceptance, E,TB,D, VU,NR by  at 11/13/2023 1208    Acceptance, E,TB, VU by  at 11/12/2023 0333    Acceptance, E,TB, VU by  at 11/11/2023 0315    Acceptance, E, NR by  at 11/9/2023 0939                         Point: Home exercise program (Done)       Learning Progress Summary             Patient Acceptance, E,TB,D, VU,NR by  at 11/13/2023 1208    Acceptance, E,TB, VU by  at 11/12/2023 0333    Acceptance, E,TB, VU by  at 11/11/2023 0315    Acceptance, E, NR by  at 11/9/2023 0939                         Point: Body mechanics (Done)       Learning Progress Summary             Patient Acceptance, E,TB,D, VU,NR by  at 11/13/2023 1208    Acceptance, E,TB, VU by SS at 11/12/2023 0333    Acceptance, E,TB, VU by  at 11/11/2023 0315    Acceptance, E, NR by  at 11/9/2023 0939                         Point: Precautions (Done)       Learning Progress Summary             Patient Acceptance, E,TB,D, VU,NR by  at 11/13/2023 1208    Acceptance, E,TB, VU by  at 11/12/2023 0333     Acceptance, E,TB, VU by  at 11/11/2023 0315    Acceptance, E, NR by  at 11/9/2023 0939                                         User Key       Initials Effective Dates Name Provider Type Discipline     06/16/21 -  Susan Kan, PT Physical Therapist PT     06/16/21 -  Ivy Horowitz PTA Physical Therapist Assistant PT     02/03/22 -  Dagoberto, Brady, RN Registered Nurse Nurse                  PT Recommendation and Plan     Plan of Care Reviewed With: patient  Progress: improving  Outcome Evaluation: Pt seen by PT this AM for tx. Pt sat up to EOB req mod/max A and max cues for sequencing. Pt stood 5x (EOB, chair) req initially min A and use of fww although improved to CGA and use of fww. Pt amb 1' to chair then 5' fwd/bkwd w/ amb improving from min A to CGA and use of fww. Ther ex was performed in chair for strengthening/ PT will prog as pt marilee.     Time Calculation:         PT Charges       Row Name 11/13/23 1208             Time Calculation    Start Time 1026  -PH      Stop Time 1045  -PH      Time Calculation (min) 19 min  -PH      PT Received On 11/13/23  -PH      PT - Next Appointment 11/15/23  -PH         Timed Charges    89888 - PT Therapeutic Exercise Minutes 5  -PH      02221 - PT Therapeutic Activity Minutes 14  -PH         Total Minutes    Timed Charges Total Minutes 19  -PH       Total Minutes 19  -PH                User Key  (r) = Recorded By, (t) = Taken By, (c) = Cosigned By      Initials Name Provider Type    PH Ivy Horowitz PTA Physical Therapist Assistant                  Therapy Charges for Today       Code Description Service Date Service Provider Modifiers Qty    [id number]  PT THERAPEUTIC ACT EA 15 MIN 11/13/2023 Ivy Horowitz PTA GP 1            PT G-Codes  Outcome Measure Options: AM-PAC 6 Clicks Basic Mobility (PT)  AM-PAC 6 Clicks Score (PT): 15  AM-PAC 6 Clicks Score (OT): 12  PT Discharge Summary  Anticipated Discharge Disposition (PT): skilled nursing  facility    Ivy Horowitz, PTA  11/13/2023

## 2023-11-14 PROBLEM — T79.6XXA TRAUMATIC RHABDOMYOLYSIS: Status: ACTIVE | Noted: 2023-11-14

## 2023-11-14 PROBLEM — F03.90 DEMENTIA WITHOUT BEHAVIORAL DISTURBANCE: Status: ACTIVE | Noted: 2023-11-14

## 2023-11-14 LAB
ALBUMIN SERPL-MCNC: 3.2 G/DL (ref 3.5–5.2)
ALBUMIN/GLOB SERPL: 1 G/DL
ALP SERPL-CCNC: 62 U/L (ref 39–117)
ALT SERPL W P-5'-P-CCNC: 12 U/L (ref 1–41)
ANION GAP SERPL CALCULATED.3IONS-SCNC: 8.7 MMOL/L (ref 5–15)
AST SERPL-CCNC: 16 U/L (ref 1–40)
BASOPHILS # BLD AUTO: 0.03 10*3/MM3 (ref 0–0.2)
BASOPHILS NFR BLD AUTO: 0.5 % (ref 0–1.5)
BILIRUB SERPL-MCNC: 0.4 MG/DL (ref 0–1.2)
BUN SERPL-MCNC: 17 MG/DL (ref 8–23)
BUN/CREAT SERPL: 15.9 (ref 7–25)
CALCIUM SPEC-SCNC: 8.6 MG/DL (ref 8.6–10.5)
CHLORIDE SERPL-SCNC: 107 MMOL/L (ref 98–107)
CO2 SERPL-SCNC: 26.3 MMOL/L (ref 22–29)
CREAT SERPL-MCNC: 1.07 MG/DL (ref 0.76–1.27)
DEPRECATED RDW RBC AUTO: 41 FL (ref 37–54)
EGFRCR SERPLBLD CKD-EPI 2021: 70.2 ML/MIN/1.73
EOSINOPHIL # BLD AUTO: 0.21 10*3/MM3 (ref 0–0.4)
EOSINOPHIL NFR BLD AUTO: 3.7 % (ref 0.3–6.2)
ERYTHROCYTE [DISTWIDTH] IN BLOOD BY AUTOMATED COUNT: 12.6 % (ref 12.3–15.4)
GLOBULIN UR ELPH-MCNC: 3.1 GM/DL
GLUCOSE SERPL-MCNC: 112 MG/DL (ref 65–99)
HCT VFR BLD AUTO: 34.7 % (ref 37.5–51)
HGB BLD-MCNC: 12.1 G/DL (ref 13–17.7)
IMM GRANULOCYTES # BLD AUTO: 0.09 10*3/MM3 (ref 0–0.05)
IMM GRANULOCYTES NFR BLD AUTO: 1.6 % (ref 0–0.5)
LYMPHOCYTES # BLD AUTO: 1.28 10*3/MM3 (ref 0.7–3.1)
LYMPHOCYTES NFR BLD AUTO: 22.8 % (ref 19.6–45.3)
MCH RBC QN AUTO: 31.4 PG (ref 26.6–33)
MCHC RBC AUTO-ENTMCNC: 34.9 G/DL (ref 31.5–35.7)
MCV RBC AUTO: 90.1 FL (ref 79–97)
MONOCYTES # BLD AUTO: 0.51 10*3/MM3 (ref 0.1–0.9)
MONOCYTES NFR BLD AUTO: 9.1 % (ref 5–12)
NEUTROPHILS NFR BLD AUTO: 3.49 10*3/MM3 (ref 1.7–7)
NEUTROPHILS NFR BLD AUTO: 62.3 % (ref 42.7–76)
NRBC BLD AUTO-RTO: 0 /100 WBC (ref 0–0.2)
PLATELET # BLD AUTO: 268 10*3/MM3 (ref 140–450)
PMV BLD AUTO: 9.9 FL (ref 6–12)
POTASSIUM SERPL-SCNC: 3.4 MMOL/L (ref 3.5–5.2)
PROCALCITONIN SERPL-MCNC: 0.05 NG/ML (ref 0–0.25)
PROT SERPL-MCNC: 6.3 G/DL (ref 6–8.5)
RBC # BLD AUTO: 3.85 10*6/MM3 (ref 4.14–5.8)
SODIUM SERPL-SCNC: 142 MMOL/L (ref 136–145)
WBC NRBC COR # BLD: 5.61 10*3/MM3 (ref 3.4–10.8)

## 2023-11-14 PROCEDURE — 84145 PROCALCITONIN (PCT): CPT | Performed by: INTERNAL MEDICINE

## 2023-11-14 PROCEDURE — 25010000002 ENOXAPARIN PER 10 MG: Performed by: INTERNAL MEDICINE

## 2023-11-14 PROCEDURE — 25810000003 LACTATED RINGERS PER 1000 ML: Performed by: INTERNAL MEDICINE

## 2023-11-14 PROCEDURE — 85025 COMPLETE CBC W/AUTO DIFF WBC: CPT | Performed by: INTERNAL MEDICINE

## 2023-11-14 PROCEDURE — 80053 COMPREHEN METABOLIC PANEL: CPT | Performed by: INTERNAL MEDICINE

## 2023-11-14 RX ORDER — UREA 10 %
3 LOTION (ML) TOPICAL NIGHTLY PRN
Status: DISCONTINUED | OUTPATIENT
Start: 2023-11-14 | End: 2023-11-21 | Stop reason: HOSPADM

## 2023-11-14 RX ORDER — AMLODIPINE BESYLATE 5 MG/1
5 TABLET ORAL
Start: 2023-11-15 | End: 2023-11-21 | Stop reason: SDUPTHER

## 2023-11-14 RX ORDER — LANOLIN ALCOHOL/MO/W.PET/CERES
3 CREAM (GRAM) TOPICAL NIGHTLY
Start: 2023-11-14 | End: 2023-11-21 | Stop reason: SDUPTHER

## 2023-11-14 RX ADMIN — SODIUM CHLORIDE, POTASSIUM CHLORIDE, SODIUM LACTATE AND CALCIUM CHLORIDE 75 ML/HR: 600; 310; 30; 20 INJECTION, SOLUTION INTRAVENOUS at 06:02

## 2023-11-14 RX ADMIN — ZINC OXIDE 1 APPLICATION: 200 OINTMENT TOPICAL at 08:27

## 2023-11-14 RX ADMIN — SENNOSIDES AND DOCUSATE SODIUM 2 TABLET: 50; 8.6 TABLET ORAL at 08:27

## 2023-11-14 RX ADMIN — Medication 3 MG: at 00:26

## 2023-11-14 RX ADMIN — Medication 10 ML: at 08:26

## 2023-11-14 RX ADMIN — Medication 10 ML: at 21:03

## 2023-11-14 RX ADMIN — AMLODIPINE BESYLATE 5 MG: 5 TABLET ORAL at 08:29

## 2023-11-14 RX ADMIN — DONEPEZIL HYDROCHLORIDE 10 MG: 10 TABLET, FILM COATED ORAL at 18:33

## 2023-11-14 RX ADMIN — ENOXAPARIN SODIUM 40 MG: 100 INJECTION SUBCUTANEOUS at 14:00

## 2023-11-14 RX ADMIN — PANTOPRAZOLE SODIUM 40 MG: 40 TABLET, DELAYED RELEASE ORAL at 06:02

## 2023-11-14 RX ADMIN — ZINC OXIDE 1 APPLICATION: 200 OINTMENT TOPICAL at 21:03

## 2023-11-14 NOTE — PLAN OF CARE
Goal Outcome Evaluation:  Plan of Care Reviewed With: patient           Outcome Evaluation: COVID isolation. Oriented to self only. Cooperative.  Room air. No cough or SOA. Swallows meds whole in applesauce w/o diff. Telemetry SB/SR.

## 2023-11-14 NOTE — PLAN OF CARE
Goal Outcome Evaluation:            To [facility] tomorrow. Turned every two hours. Bottom pink, blanchable. Up chair. No complaints. Safety maintained. Tami gastelum

## 2023-11-14 NOTE — CASE MANAGEMENT/SOCIAL WORK
Continued Stay Note  [facility]     Patient Name: Jake Kan  MRN: [id number]  Today's Date: 11/14/2023    Admit Date: 11/8/2023    Plan: [facility] will need strecher transport   Discharge Plan       Row Name 11/14/23 1530       Plan    Plan [facility] will need strecher transport    Plan Comments Per [facility] has a bed available for patient today at [facility], room 181. CCP spoke with [facility], no stretchers available, spoke with Matthew alvarez [facility], all stretchers booked for today. Spoke with [facility] no stretchers available today. Spoke with [facility] they don't transport COVID + patients. Spoke with [facility] all stretchers booked for today. CCP sent secure chat to Dr. Ortiz and the patient's nurse regarding discharge on hold due to no stretchers available for transport today. CCP to make stretcher transport arrangements for tomorrow unless a stretcher transport becomes available today. CCP to follow. CD, CSW.                   Discharge Codes    No documentation.                 Expected Discharge Date and Time       Expected Discharge Date Expected Discharge Time    Nov 14, 2023

## 2023-11-14 NOTE — CASE MANAGEMENT/SOCIAL WORK
Continued Stay Note  [facility]     Patient Name: Jake Kan  MRN: [id number]  Today's Date: 11/14/2023    Admit Date: 11/8/2023    Plan: [facility] will need strecher transport   Discharge Plan       Row Name 11/14/23 1604       Plan    Plan Comments CCP spoke with Agatha Mccabe who states they are still waiting for the significant other to sign admission paperwork for the patient. Wen states they are hopeful the significant other will sign them tomorrow. CCP to book stretcher transport once the admission paperwork has been completed and CCP has been notified by Wen with [facility] that admission can happend. CCP to follow. SHEILA, CSW.      Row Name 11/14/23 4120       Plan    Plan [place] [facility] will need strecher transport    Plan Comments Per Agatha Reeves has a bed available for patient today at [facility], room 181. CCP spoke with [facility], no stretchers available, spoke with Matthew at [facility], all stretchers booked for today. Spoke with [facility] no stretchers available today. Spoke with [facility] they don't transport COVID + patients. Spoke with [facility] all stretchers booked for today. CCP sent secure chat to Dr. Ortiz and the patient's nurse regarding discharge on hold due to no stretchers available for transport today. CCP to make stretcher transport arrangements for tomorrow unless a stretcher transport becomes available today. CCP to follow. CD, CSW.                   Discharge Codes    No documentation.                 Expected Discharge Date and Time       Expected Discharge Date Expected Discharge Time    Nov 14, 2023

## 2023-11-14 NOTE — DISCHARGE SUMMARY
Patient Name: Jake Kan  : 1945  MRN: [id number]    Date of Admission: 2023  Date of Discharge:  2023  Primary Care Physician: Sree Thomas MD      Chief Complaint:   Weakness - Generalized      Discharge Diagnoses     Active Hospital Problems    Diagnosis  POA    **Pneumonia due to COVID-19 virus [U07.1, J12.82]  Yes    Traumatic rhabdomyolysis [T79.6XXA]  Unknown    Dementia without behavioral disturbance [F03.90]  Unknown    Acute encephalopathy [G93.40]  Yes    Age-related cognitive decline [R41.81]  Yes    Stage 3a chronic kidney disease [N18.31]  Yes    Metabolic encephalopathy [G93.41]  Yes    Gastroesophageal reflux disease without esophagitis [K21.9]  Yes    Hyperlipidemia [E78.5]  Yes      Resolved Hospital Problems   No resolved problems to display.        Hospital Course     Mr. Kan is a 78 y.o. male with a history of dementia who presented to [facility] initially complaining of falling/not being able to get off the floor.  Please see the admitting history and physical for further details.  He was found to have COVID-19 and traumatic rhabdomyolysis and was admitted to the hospital for further evaluation and treatment.  The patient was initiated on IV fluids and his CK trended down/normalized.  Renal function remained stable.  For his COVID-19 infection the patient completed a course of Paxlovid.  He is stable on room air at the time of discharge.   The patient was noted to have multiple blood pressure readings that were elevated.  He has been started on amlodipine 5 mg and this can be further titrated as an outpatient.    The patient was evaluated by physical therapy and they recommended rehab at discharge for additional strengthening prior to returning home.  He should also follow-up with his PCP in a week for posthospital follow-up visit.      Day of Discharge     Subjective:  Patient resting in bed, he is alert to self which is his baseline.  Eager for  "discharge as he is \"tired of being here.\"    Physical Exam:  Temp:  [97.3 °F (36.3 °C)-98 °F (36.7 °C)] 97.7 °F (36.5 °C)  Heart Rate:  [60-83] 67  Resp:  [16] 16  BP: (138-188)/(73-92) 188/92  Body mass index is 22.81 kg/m².  Physical Exam  Vitals and nursing note reviewed.   Constitutional:       General: He is awake. He is not in acute distress.     Comments: Elderly, chronically ill-appearing   Cardiovascular:      Rate and Rhythm: Normal rate and regular rhythm.      Pulses: Normal pulses.      Heart sounds: Normal heart sounds.   Pulmonary:      Effort: Pulmonary effort is normal. No respiratory distress.      Comments: On room air  Abdominal:      General: Bowel sounds are normal.      Palpations: Abdomen is soft.      Tenderness: There is no abdominal tenderness.   Skin:     General: Skin is warm and dry.   Neurological:      Mental Status: He is alert. Mental status is at baseline. He is confused.         Consultants     Consult Orders (all) (From admission, onward)       Start     Ordered    11/08/23 1037  [facility] (on-call MD unless specified) Details  Once        Specialty:  Hospitalist  Provider:  (Not yet assigned)    11/08/23 1036                  Procedures     Imaging Results (All)       Procedure Component Value Units Date/Time    CT Head Without Contrast [[id number]] Collected: 11/08/23 0922     Updated: 11/08/23 1658    Narrative:      CT HEAD WITHOUT CONTRAST     HISTORY: Dizziness, found on floor.     COMPARISON: CT head 08/24/2022.     FINDINGS: There is no evidence of fracture or of intracranial  hemorrhage. A scalp hematoma is noted in the frontal region anteriorly.  The brain and ventricles are symmetrical. There is no evidence of acute  infarction, hydrocephalus or midline shift. A more nodular area of soft  tissue is appreciated overlying the frontal bone to the left of midline  superiorly measuring 6 mm in size. This is new versus the CT examination  of 08/24/2022.       Impression:      A " scalp hematoma is noted in the frontal region. There is no  evidence of fracture or of intracranial hemorrhage. Further evaluation  could be performed with a MRI examination of the brain as indicated.  There is a 6 mm nodule involving the scalp overlying the frontal bone to  the left superiorly which is new versus the prior examination. This may  be sequela from trauma or potentially a new nodule/sebaceous cyst. A  follow-up CT examination of the head is recommended. If this nodule  persists, it can be further characterized with a MRI examination of the  brain with and without contrast.     Radiation dose reduction techniques were utilized, including automated  exposure control and exposure modulation based on body size.        This report was finalized on 11/8/2023 4:55 PM by Dr. Benjamin Jacome M.D  on Workstation: [id number]       XR Chest 1 View [[id number]] Collected: 11/08/23 0849     Updated: 11/08/23 0855    Narrative:      XR CHEST 1 VW-11/8/2023     HISTORY: Weakness. Patient down with decreased responsiveness.     Heart size is within normal limits. The lungs appear free of acute  infiltrates. There is deformity of the left mid clavicle from an old  left mid clavicle fracture. Bones and soft tissues are otherwise  unremarkable.     There is slight blunting of the left costophrenic sulcus.       Impression:      1. Slight blunting of the left costophrenic sulcus which may be related  to a small left pleural effusion.  2. No other significant findings are noted.        This report was finalized on 11/8/2023 8:52 AM by Dr. Justin Hirsch M.D on Workstation: [id number]               Pertinent Labs     Results from last 7 days   Lab Units 11/14/23 0652 11/13/23 0621 11/12/23 0557 11/11/23 0847   WBC 10*3/mm3 5.61 5.26 6.63 4.65   HEMOGLOBIN g/dL 12.1* 12.2* 12.0* 12.4*   PLATELETS 10*3/mm3 268 230 211 208     Results from last 7 days   Lab Units 11/14/23 0652 11/13/23 0621 11/12/23 0557 11/11/23 0847  "  SODIUM mmol/L 142 140 140 141   POTASSIUM mmol/L 3.4* 3.7 4.2 3.5   CHLORIDE mmol/L 107 104 104 105   CO2 mmol/L 26.3 27.9 27.0 25.3   BUN mg/dL 17 19 20 15   CREATININE mg/dL 1.07 1.03 1.19 1.02   GLUCOSE mg/dL 112* 103* 98 101*   Estimated Creatinine Clearance: 57.8 mL/min (by C-G formula based on SCr of 1.07 mg/dL).  Results from last 7 days   Lab Units 11/14/23  0652 11/13/23  0621 11/12/23  0557 11/11/23  0847   ALBUMIN g/dL 3.2* 3.2* 3.4* 3.6   BILIRUBIN mg/dL 0.4 0.7 1.0 1.0   ALK PHOS U/L 62 60 59 62   AST (SGOT) U/L 16 21 25 38   ALT (SGPT) U/L 12 14 17 20     Results from last 7 days   Lab Units 11/14/23  0652 11/13/23  0621 11/12/23  0557 11/11/23  0847 11/09/23  0608 11/08/23  0822   CALCIUM mg/dL 8.6 8.8 8.9 9.0   < > 9.7   ALBUMIN g/dL 3.2* 3.2* 3.4* 3.6   < > 4.3   MAGNESIUM mg/dL  --   --   --   --   --  2.0    < > = values in this interval not displayed.       Results from last 7 days   Lab Units 11/13/23  0621 11/12/23  0557 11/11/23  0847 11/10/23  0610 11/09/23  0608 11/08/23  1043 11/08/23  0822   CK TOTAL U/L 206* 390* 828* 1,240*   < >  --  2,682*   HSTROP T ng/L  --   --   --   --   --  26* 22*   D DIMER QUANT MCGFEU/mL  --  1.57*  --  1.73*  --   --  1.64*    < > = values in this interval not displayed.           Invalid input(s): \"LDLCALC\"        Test Results Pending at Discharge       Discharge Details        Discharge Medications        New Medications        Instructions Start Date   amLODIPine 5 MG tablet  Commonly known as: NORVASC   5 mg, Oral, Every 24 Hours Scheduled   Start Date: November 15, 2023     melatonin 3 MG tablet   3 mg, Oral, Nightly             Continue These Medications        Instructions Start Date   donepezil 10 MG tablet  Commonly known as: Aricept   10 mg, Oral, Daily With Dinner      lansoprazole 15 MG capsule  Commonly known as: PREVACID   15 mg, Oral, Daily               No Known Allergies      Discharge Disposition:  Skilled Nursing Facility (DC - " External)    Discharge Diet:  Diet Order   Procedures    Diet: Regular/House Diet; Texture: Regular Texture (IDDSI 7); Fluid Consistency: Thin (IDDSI 0)       Discharge Activity:   Activity Instructions       Activity as Tolerated      Up WIth Assist              CODE STATUS:    Code Status and Medical Interventions:   Ordered at: 11/08/23 1332     Code Status (Patient has no pulse and is not breathing):    CPR (Attempt to Resuscitate)     Medical Interventions (Patient has pulse or is breathing):    Full Support       No future appointments.  Additional Instructions for the Follow-ups that You Need to Schedule       Discharge Follow-up with PCP   As directed       Currently Documented PCP:    Sree Thomas MD    PCP Phone Number:    [phone]     Follow Up Details: post-hospital follow up               Follow-up Information       Sree Thomas MD .    Specialty: Family Medicine  Why: post-hospital follow up  Contact information:  [address] [facility] [address]  [phone]                             Additional Instructions for the Follow-ups that You Need to Schedule       Discharge Follow-up with PCP   As directed       Currently Documented PCP:    Sree Thomas MD    PCP Phone Number:    [phone]     Follow Up Details: post-hospital follow up            Time Spent on Discharge:  I spent greater than 30 minutes on this discharge activity which included: face-to-face encounter with the patient, reviewing the data in the system, coordination of the care with the nursing staff as well as consultants, documentation, and entering orders.       Merline Ortiz MD  [facility]  11/14/23  09:57 EST

## 2023-11-14 NOTE — PLAN OF CARE
Goal Outcome Evaluation:         Pt AO to self , restless, Room air , SR/ SB on tele , VSS, No sign of acute distress noted. Turn q 2 , incontinent, Plan of care is ongoing.

## 2023-11-15 ENCOUNTER — HOME HEALTH ADMISSION (OUTPATIENT)
Dept: HOME HEALTH SERVICES | Facility: HOME HEALTHCARE | Age: 78
End: 2023-11-15
Payer: MEDICARE

## 2023-11-15 LAB
ALBUMIN SERPL-MCNC: 3.5 G/DL (ref 3.5–5.2)
ALBUMIN/GLOB SERPL: 1.1 G/DL
ALP SERPL-CCNC: 70 U/L (ref 39–117)
ALT SERPL W P-5'-P-CCNC: 16 U/L (ref 1–41)
ANION GAP SERPL CALCULATED.3IONS-SCNC: 9.3 MMOL/L (ref 5–15)
AST SERPL-CCNC: 19 U/L (ref 1–40)
BASOPHILS # BLD AUTO: 0.04 10*3/MM3 (ref 0–0.2)
BASOPHILS NFR BLD AUTO: 0.6 % (ref 0–1.5)
BILIRUB SERPL-MCNC: 0.4 MG/DL (ref 0–1.2)
BUN SERPL-MCNC: 16 MG/DL (ref 8–23)
BUN/CREAT SERPL: 17 (ref 7–25)
CALCIUM SPEC-SCNC: 9.2 MG/DL (ref 8.6–10.5)
CHLORIDE SERPL-SCNC: 107 MMOL/L (ref 98–107)
CO2 SERPL-SCNC: 26.7 MMOL/L (ref 22–29)
CREAT SERPL-MCNC: 0.94 MG/DL (ref 0.76–1.27)
DEPRECATED RDW RBC AUTO: 43.1 FL (ref 37–54)
EGFRCR SERPLBLD CKD-EPI 2021: 81.9 ML/MIN/1.73
EOSINOPHIL # BLD AUTO: 0.21 10*3/MM3 (ref 0–0.4)
EOSINOPHIL NFR BLD AUTO: 3.1 % (ref 0.3–6.2)
ERYTHROCYTE [DISTWIDTH] IN BLOOD BY AUTOMATED COUNT: 12.8 % (ref 12.3–15.4)
GLOBULIN UR ELPH-MCNC: 3.1 GM/DL
GLUCOSE SERPL-MCNC: 103 MG/DL (ref 65–99)
HCT VFR BLD AUTO: 38.4 % (ref 37.5–51)
HGB BLD-MCNC: 13.2 G/DL (ref 13–17.7)
IMM GRANULOCYTES # BLD AUTO: 0.18 10*3/MM3 (ref 0–0.05)
IMM GRANULOCYTES NFR BLD AUTO: 2.7 % (ref 0–0.5)
LYMPHOCYTES # BLD AUTO: 1.5 10*3/MM3 (ref 0.7–3.1)
LYMPHOCYTES NFR BLD AUTO: 22.1 % (ref 19.6–45.3)
MCH RBC QN AUTO: 31.9 PG (ref 26.6–33)
MCHC RBC AUTO-ENTMCNC: 34.4 G/DL (ref 31.5–35.7)
MCV RBC AUTO: 92.8 FL (ref 79–97)
MONOCYTES # BLD AUTO: 0.62 10*3/MM3 (ref 0.1–0.9)
MONOCYTES NFR BLD AUTO: 9.1 % (ref 5–12)
NEUTROPHILS NFR BLD AUTO: 4.23 10*3/MM3 (ref 1.7–7)
NEUTROPHILS NFR BLD AUTO: 62.4 % (ref 42.7–76)
NRBC BLD AUTO-RTO: 0 /100 WBC (ref 0–0.2)
PLATELET # BLD AUTO: 309 10*3/MM3 (ref 140–450)
PMV BLD AUTO: 9.9 FL (ref 6–12)
POTASSIUM SERPL-SCNC: 3.5 MMOL/L (ref 3.5–5.2)
PROT SERPL-MCNC: 6.6 G/DL (ref 6–8.5)
RBC # BLD AUTO: 4.14 10*6/MM3 (ref 4.14–5.8)
SODIUM SERPL-SCNC: 143 MMOL/L (ref 136–145)
WBC NRBC COR # BLD: 6.78 10*3/MM3 (ref 3.4–10.8)

## 2023-11-15 PROCEDURE — 80053 COMPREHEN METABOLIC PANEL: CPT | Performed by: INTERNAL MEDICINE

## 2023-11-15 PROCEDURE — 25010000002 ENOXAPARIN PER 10 MG: Performed by: INTERNAL MEDICINE

## 2023-11-15 PROCEDURE — 97530 THERAPEUTIC ACTIVITIES: CPT

## 2023-11-15 PROCEDURE — 85025 COMPLETE CBC W/AUTO DIFF WBC: CPT | Performed by: INTERNAL MEDICINE

## 2023-11-15 RX ADMIN — Medication 10 ML: at 20:25

## 2023-11-15 RX ADMIN — Medication 10 ML: at 08:35

## 2023-11-15 RX ADMIN — ZINC OXIDE 1 APPLICATION: 200 OINTMENT TOPICAL at 08:35

## 2023-11-15 RX ADMIN — PANTOPRAZOLE SODIUM 40 MG: 40 TABLET, DELAYED RELEASE ORAL at 05:47

## 2023-11-15 RX ADMIN — SENNOSIDES AND DOCUSATE SODIUM 2 TABLET: 50; 8.6 TABLET ORAL at 20:25

## 2023-11-15 RX ADMIN — ZINC OXIDE 1 APPLICATION: 200 OINTMENT TOPICAL at 20:25

## 2023-11-15 RX ADMIN — DONEPEZIL HYDROCHLORIDE 10 MG: 10 TABLET, FILM COATED ORAL at 17:38

## 2023-11-15 RX ADMIN — ENOXAPARIN SODIUM 40 MG: 100 INJECTION SUBCUTANEOUS at 14:01

## 2023-11-15 RX ADMIN — AMLODIPINE BESYLATE 5 MG: 5 TABLET ORAL at 08:34

## 2023-11-15 RX ADMIN — SENNOSIDES AND DOCUSATE SODIUM 2 TABLET: 50; 8.6 TABLET ORAL at 08:34

## 2023-11-15 NOTE — PLAN OF CARE
Goal Outcome Evaluation:         Pt resting in bed , Ao to self , turn q 2 , SR on tele Room air , restless, fall percaution maintained, incontinent. Plan of care is ongoing

## 2023-11-15 NOTE — PLAN OF CARE
Goal Outcome Evaluation:  Plan of Care Reviewed With: patient, friend           Outcome Evaluation: Pt is not to be DC'd today r/t to safe discharge plan.  MD, CCP notated.  Possible guardianship to be acquired.  RA, VSS, NSR.

## 2023-11-15 NOTE — PLAN OF CARE
Goal Outcome Evaluation:  Plan of Care Reviewed With: patient        Progress: improving  Outcome Evaluation: Pt was in bed sleeping at beg of PT tx although awoken easily. Pt performed ther ex while in bed then sat up to EOB w/ min A and max cues for initiation and sequencing. Pt stood w/ unsteadiness after cues for widening KAYLEEN and req min A w/ use of fww. Pt amb around bed to recliner w/ freq cues to widen KAYLEEN as B feet brushed each other and pt appearing to lack safety awareness.  Pt was unsteady w/ no overt LOB and req min A w/ use of fww. Pt UIc at end of session w/ alarm set. PT will prog as pt marilee.      Anticipated Discharge Disposition (PT): skilled nursing facility

## 2023-11-15 NOTE — PROGRESS NOTES
Name: Jake Kan ADMIT: 2023   : 1945  PCP: Sree Thomas MD    MRN: [id number] LOS: 7 days   AGE/SEX: 78 y.o. male  ROOM: [facility]     Subjective   Subjective   Patient resting in bed, no complaints.      Objective   Objective   Vital Signs  Temp:  [97.2 °F (36.2 °C)-98.1 °F (36.7 °C)] 97.2 °F (36.2 °C)  Heart Rate:  [59-65] 64  Resp:  [16] 16  BP: (159-195)/(78-89) 195/78  SpO2:  [96 %-100 %] 96 %  on   ;   Device (Oxygen Therapy): room air  Body mass index is 22.81 kg/m².  Physical Exam  Vitals and nursing note reviewed.   Constitutional:       General: He is awake. He is not in acute distress.     Comments: Elderly, chronically ill-appearing   Cardiovascular:      Rate and Rhythm: Normal rate and regular rhythm.      Pulses: Normal pulses.      Heart sounds: Normal heart sounds.   Pulmonary:      Effort: Pulmonary effort is normal. No respiratory distress.      Breath sounds: Decreased breath sounds present.      Comments: On room air  Abdominal:      General: Bowel sounds are normal.      Palpations: Abdomen is soft.      Tenderness: There is no abdominal tenderness.   Skin:     General: Skin is warm and dry.   Neurological:      Mental Status: He is alert. Mental status is at baseline. He is confused.       Results Review     I reviewed the patient's new clinical results.  Results from last 7 days   Lab Units 11/15/23  0541 23  0557   WBC 10*3/mm3 6.78 5.61 5.26 6.63   HEMOGLOBIN g/dL 13.2 12.1* 12.2* 12.0*   PLATELETS 10*3/mm3 309 268 230 211     Results from last 7 days   Lab Units 11/15/23  0541 2352 23  0557   SODIUM mmol/L 143 142 140 140   POTASSIUM mmol/L 3.5 3.4* 3.7 4.2   CHLORIDE mmol/L 107 107 104 104   CO2 mmol/L 26.7 26.3 27.9 27.0   BUN mg/dL 16 17 19 20   CREATININE mg/dL 0.94 1.07 1.03 1.19   GLUCOSE mg/dL 103* 112* 103* 98   EGFR mL/min/1.73 81.9 70.2 73.4 61.8     Results from last 7 days   Lab Units  "11/15/23  0541 11/14/23  0652 11/13/23  0621 11/12/23  0557   ALBUMIN g/dL 3.5 3.2* 3.2* 3.4*   BILIRUBIN mg/dL 0.4 0.4 0.7 1.0   ALK PHOS U/L 70 62 60 59   AST (SGOT) U/L 19 16 21 25   ALT (SGPT) U/L 16 12 14 17     Results from last 7 days   Lab Units 11/15/23  0541 11/14/23 0652 11/13/23 0621 11/12/23  0557   CALCIUM mg/dL 9.2 8.6 8.8 8.9   ALBUMIN g/dL 3.5 3.2* 3.2* 3.4*     Results from last 7 days   Lab Units 11/14/23 0652 11/12/23 0557 11/10/23  0610   PROCALCITONIN ng/mL 0.05 0.07 0.12     No results found for: \"HGBA1C\", \"POCGLU\"    No radiology results for the last day    I have personally reviewed all medications:  Scheduled Medications  amLODIPine, 5 mg, Oral, Q24H  donepezil, 10 mg, Oral, Daily With Dinner  enoxaparin, 40 mg, Subcutaneous, Q24H  hydrocortisone-bacitracin-zinc oxide-nystatin, 1 application , Topical, BID  pantoprazole, 40 mg, Oral, Q AM  senna-docusate sodium, 2 tablet, Oral, BID  sodium chloride, 10 mL, Intravenous, Q12H    Infusions     Diet  Diet: Regular/House Diet; Texture: Regular Texture (IDDSI 7); Fluid Consistency: Thin (IDDSI 0)    I have personally reviewed:  [x]  Laboratory   []  Microbiology   []  Radiology   [x]  EKG/Telemetry  []  Cardiology/Vascular   []  Pathology    []  Records       Assessment/Plan     Active Hospital Problems    Diagnosis  POA    **Pneumonia due to COVID-19 virus [U07.1, J12.82]  Yes    Traumatic rhabdomyolysis [T79.6XXA]  Unknown    Dementia without behavioral disturbance [F03.90]  Unknown    Acute encephalopathy [G93.40]  Yes    Age-related cognitive decline [R41.81]  Yes    Stage 3a chronic kidney disease [N18.31]  Yes    Gastroesophageal reflux disease without esophagitis [K21.9]  Yes    Hyperlipidemia [E78.5]  Yes      Resolved Hospital Problems   No resolved problems to display.       78 y.o. male admitted with Pneumonia due to COVID-19 virus.    Traumatic Rhabdomyolysis  - Etiology secondary to prolonged time on his floor, no evidence of " compartment syndrome or pigment nephropathy  -CK trended down with IVF down to 206, will DC fluids     COVID-19 Pneumonia  - Diagnosis noted on arrival. No evidence of bacterial pneumonia, patient not hypoxic so treatment with steroids not indicated. Currently on Paxlovid, tolerating well.   -Completed Paxlovid    High blood pressure/hypertension  -No previous diagnosis per patient, initiate amlodipine 5 mg    Chronic kidney disease stage 3A  -Creatinine has been stable, continue to trend on daily BMP    Anemia  - Hemoglobin low on most recent labs, however, stable from prior. No evidence of overt blood loss. No indications for acute intervention at this time.    - Order repeat CBC in AM for reassessment. Continue to monitor, transfuse for hemoglobin <7.     Metabolic Encephalopathy  Dementia  - Worsening mental status changes noted on arrival, now improving with treatment for contributing etiologies. Remains confused, but appears to be near baseline at present.  - Continue to monitor. Delirium/aspiration/fall precautions.    Debility/impairment in physical status  - PT/OT recommending SNF, CCP to see    Lovenox 40 mg SC daily for DVT prophylaxis.  Full code.  Discussed with patient and nursing staff.  Anticipate discharge to SNU facility once arrangements have been made.    Initially plan was to discharge to SNF for additional strengthening/care however patient's friend unwilling to sign paperwork for him to go to facility.  Given that the patient has no known family and friends is not willing to sign paperwork, have reached out to CCP to discuss possibility of needing guardianship.      Merline Ortiz MD  [facility]  11/15/23

## 2023-11-15 NOTE — DISCHARGE SUMMARY
Patient Name: Jake Kan  : 1945  MRN: [id number]    Date of Admission: 2023  Date of Discharge:  11/15/2023  Primary Care Physician: Sree Thomas MD      Chief Complaint:   Weakness - Generalized      Discharge Diagnoses     Active Hospital Problems    Diagnosis  POA    **Pneumonia due to COVID-19 virus [U07.1, J12.82]  Yes    Traumatic rhabdomyolysis [T79.6XXA]  Unknown    Dementia without behavioral disturbance [F03.90]  Unknown    Acute encephalopathy [G93.40]  Yes    Age-related cognitive decline [R41.81]  Yes    Stage 3a chronic kidney disease [N18.31]  Yes    Gastroesophageal reflux disease without esophagitis [K21.9]  Yes    Hyperlipidemia [E78.5]  Yes      Resolved Hospital Problems   No resolved problems to display.        Hospital Course     Mr. Kan is a 78 y.o. male with a history of dementia who presented to [facility] initially complaining of falling/not being able to get off the floor.  Please see the admitting history and physical for further details.  He was found to have COVID-19 and traumatic rhabdomyolysis and was admitted to the hospital for further evaluation and treatment.  The patient was initiated on IV fluids and his CK trended down/normalized.  Renal function remained stable.  For his COVID-19 infection the patient completed a course of Paxlovid.  He is stable on room air at the time of discharge.   The patient was noted to have multiple blood pressure readings that were elevated.  He has been started on amlodipine 5 mg and this can be further titrated as an outpatient.    The patient was evaluated by physical therapy and they recommended rehab at discharge for additional strengthening prior to returning home.  He should also follow-up with his PCP in a week for posthospital follow-up visit.    Discharge delayed by 1 day due to transportation.   Day of Discharge     Subjective:  Patient resting in bed, he is in no apparent distress. Discharge to  SNF today planned.     Physical Exam:  Temp:  [97.2 °F (36.2 °C)-98.1 °F (36.7 °C)] 97.2 °F (36.2 °C)  Heart Rate:  [59-65] 64  Resp:  [16] 16  BP: (139-195)/(78-89) 195/78  Body mass index is 22.81 kg/m².  Physical Exam  Vitals and nursing note reviewed.   Constitutional:       General: He is awake. He is not in acute distress.     Comments: Elderly, chronically ill-appearing   Cardiovascular:      Rate and Rhythm: Normal rate and regular rhythm.      Pulses: Normal pulses.      Heart sounds: Normal heart sounds.   Pulmonary:      Effort: Pulmonary effort is normal. No respiratory distress.      Comments: On room air  Abdominal:      General: Bowel sounds are normal.      Palpations: Abdomen is soft.      Tenderness: There is no abdominal tenderness.   Skin:     General: Skin is warm and dry.   Neurological:      Mental Status: He is alert. Mental status is at baseline. He is confused.         Consultants     Consult Orders (all) (From admission, onward)       Start     Ordered    11/08/23 1037  [facility] (on-call MD unless specified) Details  Once        Specialty:  Hospitalist  Provider:  (Not yet assigned)    11/08/23 1036                  Procedures     Imaging Results (All)       Procedure Component Value Units Date/Time    CT Head Without Contrast [[id number]] Collected: 11/08/23 0922     Updated: 11/08/23 1658    Narrative:      CT HEAD WITHOUT CONTRAST     HISTORY: Dizziness, found on floor.     COMPARISON: CT head 08/24/2022.     FINDINGS: There is no evidence of fracture or of intracranial  hemorrhage. A scalp hematoma is noted in the frontal region anteriorly.  The brain and ventricles are symmetrical. There is no evidence of acute  infarction, hydrocephalus or midline shift. A more nodular area of soft  tissue is appreciated overlying the frontal bone to the left of midline  superiorly measuring 6 mm in size. This is new versus the CT examination  of 08/24/2022.       Impression:      A scalp hematoma is noted  in the frontal region. There is no  evidence of fracture or of intracranial hemorrhage. Further evaluation  could be performed with a MRI examination of the brain as indicated.  There is a 6 mm nodule involving the scalp overlying the frontal bone to  the left superiorly which is new versus the prior examination. This may  be sequela from trauma or potentially a new nodule/sebaceous cyst. A  follow-up CT examination of the head is recommended. If this nodule  persists, it can be further characterized with a MRI examination of the  brain with and without contrast.     Radiation dose reduction techniques were utilized, including automated  exposure control and exposure modulation based on body size.        This report was finalized on 11/8/2023 4:55 PM by Dr. Benjamin Jacome M.D  on Workstation: [id number]       XR Chest 1 View [[id number]] Collected: 11/08/23 0849     Updated: 11/08/23 0855    Narrative:      XR CHEST 1 VW-11/8/2023     HISTORY: Weakness. Patient down with decreased responsiveness.     Heart size is within normal limits. The lungs appear free of acute  infiltrates. There is deformity of the left mid clavicle from an old  left mid clavicle fracture. Bones and soft tissues are otherwise  unremarkable.     There is slight blunting of the left costophrenic sulcus.       Impression:      1. Slight blunting of the left costophrenic sulcus which may be related  to a small left pleural effusion.  2. No other significant findings are noted.        This report was finalized on 11/8/2023 8:52 AM by Dr. Justin Hirsch M.D on Workstation: [id number]               Pertinent Labs     Results from last 7 days   Lab Units 11/15/23  0541 11/14/23  0652 11/13/23  0621 11/12/23  0557   WBC 10*3/mm3 6.78 5.61 5.26 6.63   HEMOGLOBIN g/dL 13.2 12.1* 12.2* 12.0*   PLATELETS 10*3/mm3 309 268 230 211     Results from last 7 days   Lab Units 11/15/23  0541 11/14/23 0652 11/13/23 0621 11/12/23  0557   SODIUM mmol/L 143 142 140  "140   POTASSIUM mmol/L 3.5 3.4* 3.7 4.2   CHLORIDE mmol/L 107 107 104 104   CO2 mmol/L 26.7 26.3 27.9 27.0   BUN mg/dL 16 17 19 20   CREATININE mg/dL 0.94 1.07 1.03 1.19   GLUCOSE mg/dL 103* 112* 103* 98   Estimated Creatinine Clearance: 65.8 mL/min (by C-G formula based on SCr of 0.94 mg/dL).  Results from last 7 days   Lab Units 11/15/23  0541 11/14/23  0652 11/13/23 0621 11/12/23  0557   ALBUMIN g/dL 3.5 3.2* 3.2* 3.4*   BILIRUBIN mg/dL 0.4 0.4 0.7 1.0   ALK PHOS U/L 70 62 60 59   AST (SGOT) U/L 19 16 21 25   ALT (SGPT) U/L 16 12 14 17     Results from last 7 days   Lab Units 11/15/23  0541 11/14/23 0652 11/13/23 0621 11/12/23  0557   CALCIUM mg/dL 9.2 8.6 8.8 8.9   ALBUMIN g/dL 3.5 3.2* 3.2* 3.4*       Results from last 7 days   Lab Units 11/13/23  0621 11/12/23  0557 11/11/23  0847 11/10/23  0610   CK TOTAL U/L 206* 390* 828* 1,240*   D DIMER QUANT MCGFEU/mL  --  1.57*  --  1.73*           Invalid input(s): \"LDLCALC\"        Test Results Pending at Discharge       Discharge Details        Discharge Medications        New Medications        Instructions Start Date   amLODIPine 5 MG tablet  Commonly known as: NORVASC   5 mg, Oral, Every 24 Hours Scheduled      melatonin 3 MG tablet   3 mg, Oral, Nightly             Continue These Medications        Instructions Start Date   donepezil 10 MG tablet  Commonly known as: Aricept   10 mg, Oral, Daily With Dinner      lansoprazole 15 MG capsule  Commonly known as: PREVACID   15 mg, Oral, Daily               No Known Allergies      Discharge Disposition:  Skilled Nursing Facility (DC - External)    Discharge Diet:  Diet Order   Procedures    Diet: Regular/House Diet; Texture: Regular Texture (IDDSI 7); Fluid Consistency: Thin (IDDSI 0)       Discharge Activity:   Activity Instructions       Activity as Tolerated      Up WIth Assist              CODE STATUS:    Code Status and Medical Interventions:   Ordered at: 11/08/23 0415     Code Status (Patient has no pulse and " is not breathing):    CPR (Attempt to Resuscitate)     Medical Interventions (Patient has pulse or is breathing):    Full Support       No future appointments.  Additional Instructions for the Follow-ups that You Need to Schedule       Discharge Follow-up with PCP   As directed       Currently Documented PCP:    Sree Thomas MD    PCP Phone Number:    [phone]     Follow Up Details: post-hospital follow up               Contact information for follow-up providers       Sree Thomas MD .    Specialty: Family Medicine  Why: post-hospital follow up  Contact information:  [address] [facility] [id number] [phone]                       Contact information for after-discharge care       Destination       [facility] .    Service: Skilled Nursing  Contact information:  [address]  [facility] [id number] [phone]                                   Additional Instructions for the Follow-ups that You Need to Schedule       Discharge Follow-up with PCP   As directed       Currently Documented PCP:    Sree Thomas MD    PCP Phone Number:    [phone]     Follow Up Details: post-hospital follow up            Time Spent on Discharge:  I spent greater than 30 minutes on this discharge activity which included: face-to-face encounter with the patient, reviewing the data in the system, coordination of the care with the nursing staff as well as consultants, documentation, and entering orders.       Merline Ortiz MD  [facility]  11/15/23  09:57 EST

## 2023-11-15 NOTE — THERAPY TREATMENT NOTE
Patient Name: Jake Kan  : 1945    MRN: [id number]                              Today's Date: 11/15/2023       Admit Date: 2023    Visit Dx:     ICD-10-CM ICD-9-CM   1. Altered mental status, unspecified altered mental status type  R41.82 780.97   2. Non-traumatic rhabdomyolysis  M62.82 728.88   3. Troponin level elevated  R79.89 790.6   4. COVID-19 virus infection  U07.1 079.89     Patient Active Problem List   Diagnosis    Sciatica of right side    Gastroesophageal reflux disease without esophagitis    Abnormal EKG    Hyperlipidemia    Rosacea    Seborrheic dermatitis    Pneumonia due to COVID-19 virus    Metabolic encephalopathy    Stage 3a chronic kidney disease    Cytokine release syndrome, grade 2    Age-related cognitive decline    Acute encephalopathy    Non-traumatic rhabdomyolysis    Traumatic rhabdomyolysis    Dementia without behavioral disturbance     Past Medical History:   Diagnosis Date    Pneumonia due to COVID-19 virus 2021     Past Surgical History:   Procedure Laterality Date    HERNIA REPAIR      UMBILICAL HERNIA REPAIR N/A 2019    Procedure: excision of umbilical hernia mesh and umbilical hernia repair;  Surgeon: Luan Dowell MD;  Location: [facility] MAIN OR;  Service: General    WRIST FRACTURE SURGERY Left       General Information       Row Name 11/15/23 1522          Physical Therapy Time and Intention    Document Type therapy note (daily note)  -PH     Mode of Treatment physical therapy  -       Row Name 11/15/23 1522          General Information    Existing Precautions/Restrictions fall  -PH     Barriers to Rehab cognitive status  -PH       Row Name 11/15/23 1522          Safety Issues, Functional Mobility    Impairments Affecting Function (Mobility) balance;cognition;endurance/activity tolerance;strength  -PH     Cognitive Impairments, Mobility Safety/Performance insight into deficits/self-awareness;sequencing abilities;problem-solving/reasoning;safety  precaution awareness;safety precaution follow-through  -PH               User Key  (r) = Recorded By, (t) = Taken By, (c) = Cosigned By      Initials Name Provider Type    PH Ivy Horowitz PTA Physical Therapist Assistant                   Mobility       Row Name 11/15/23 1523          Bed Mobility    Bed Mobility supine-sit  -PH     Supine-Sit Moca (Bed Mobility) minimum assist (75% patient effort);verbal cues;nonverbal cues (demo/gesture)  -PH     Assistive Device (Bed Mobility) bed rails;head of bed elevated  -PH     Comment, (Bed Mobility) improved although still req cues for initiation and sequencing.  -PH       Row Name 11/15/23 1523          Sit-Stand Transfer    Sit-Stand Moca (Transfers) minimum assist (75% patient effort);verbal cues;nonverbal cues (demo/gesture)  -PH     Assistive Device (Sit-Stand Transfers) walker, 4-wheeled  -PH     Comment, (Sit-Stand Transfer) cues to widen KAYLEEN prior to and after standing w/ unsteadiness noted.  -PH       Row Name 11/15/23 1523          Gait/Stairs (Locomotion)    Moca Level (Gait) minimum assist (75% patient effort);verbal cues;nonverbal cues (demo/gesture)  -PH     Assistive Device (Gait) walker, front-wheeled  -PH     Distance in Feet (Gait) 12' around bed to chair  -PH     Deviations/Abnormal Patterns (Gait) elijah decreased;gait speed decreased;base of support, narrow;festinating/shuffling;stride length decreased;weight shifting decreased  -PH     Bilateral Gait Deviations forward flexed posture;heel strike decreased  -PH     Moca Level (Stairs) unable to assess  -PH     Comment, (Gait/Stairs) pt was slow and unsteady w/ several cues to widen KAYLEEN w/ B feet often brushing each other; no overt LOB  -PH               User Key  (r) = Recorded By, (t) = Taken By, (c) = Cosigned By      Initials Name Provider Type    PH Ivy Horowitz PTA Physical Therapist Assistant                   Obj/Interventions       Row Name  11/15/23 1525          Motor Skills    Therapeutic Exercise other (see comments)  BAP, HS, QS. hip abd/add, SLR; x 10-15 reps; AAROM for HS and SLR; freq redirect to keep eyes open and remain on task  -PH       Row Name 11/15/23 1525          Balance    Balance Assessment sitting static balance;standing static balance  -PH     Static Sitting Balance standby assist  -PH     Static Standing Balance minimal assist  -PH     Position/Device Used, Standing Balance walker, front-wheeled  -PH               User Key  (r) = Recorded By, (t) = Taken By, (c) = Cosigned By      Initials Name Provider Type    PH Ivy Horowitz, PTA Physical Therapist Assistant                   Goals/Plan    No documentation.                  Clinical Impression       Row Name 11/15/23 1526          Pain    Pretreatment Pain Rating 0/10 - no pain  -PH     Posttreatment Pain Rating 0/10 - no pain  -PH     Additional Documentation Pain Scale: Numbers Pre/Post-Treatment (Group)  -PH       Row Name 11/15/23 1526          Plan of Care Review    Plan of Care Reviewed With patient  -PH     Progress improving  -PH     Outcome Evaluation Pt was in bed sleeping at beg of PT tx although awoken easily. Pt performed ther ex while in bed then sat up to EOB w/ min A and max cues for initiation and sequencing. Pt stood w/ unsteadiness after cues for widening KAYLEEN and req min A w/ use of fww. Pt amb around bed to recliner w/ freq cues to widen KAYLEEN as B feet brushed each other and pt appearing to lack safety awareness.  Pt was unsteady w/ no overt LOB and req min A w/ use of fww. Pt UIc at end of session w/ alarm set. PT will prog as pt marilee.  -PH       Row Name 11/15/23 1526          Vital Signs    O2 Delivery Pre Treatment room air  -PH     O2 Delivery Intra Treatment room air  -PH     O2 Delivery Post Treatment room air  -PH       Row Name 11/15/23 1526          Positioning and Restraints    Pre-Treatment Position in bed  -PH     Post Treatment Position  chair  -PH     In Chair reclined;call light within reach;encouraged to call for assist;exit alarm on;notified nsg  -PH               User Key  (r) = Recorded By, (t) = Taken By, (c) = Cosigned By      Initials Name Provider Type    Ivy Baldwin PTA Physical Therapist Assistant                   Outcome Measures       Row Name 11/15/23 1529 11/15/23 0830       How much help from another person do you currently need...    Turning from your back to your side while in flat bed without using bedrails? 3  -PH 2  -RW    Moving from lying on back to sitting on the side of a flat bed without bedrails? 3  -PH 2  -RW    Moving to and from a bed to a chair (including a wheelchair)? 3  -PH 2  -RW    Standing up from a chair using your arms (e.g., wheelchair, bedside chair)? 3  -PH 2  -RW    Climbing 3-5 steps with a railing? 2  -PH 2  -RW    To walk in hospital room? 3  -PH 2  -RW    AM-PAC 6 Clicks Score (PT) 17  -PH 12  -RW    Highest Level of Mobility Goal 5 --> Static standing  -PH 4 --> Transfer to chair/commode  -RW      Row Name 11/15/23 1529          Functional Assessment    Outcome Measure Options AM-PAC 6 Clicks Basic Mobility (PT)  -PH               User Key  (r) = Recorded By, (t) = Taken By, (c) = Cosigned By      Initials Name Provider Type     Du Moss, RN Registered Nurse    Ivy Baldwin PTA Physical Therapist Assistant                                 Physical Therapy Education       Title: PT OT SLP Therapies (In Progress)       Topic: Physical Therapy (In Progress)       Point: Mobility training (In Progress)       Learning Progress Summary             Patient Acceptance, E,TB,D, NR by  at 11/15/2023 1529    Acceptance, E,TB, VU by  at 11/15/2023 1413    Acceptance, E,TB,D, VU,NR by  at 11/13/2023 1208    Acceptance, E,TB, VU by  at 11/12/2023 0333    Acceptance, E,TB, VU by  at 11/11/2023 0315    Acceptance, E, NR by  at 11/9/2023 0939   Other Acceptance, E,TB, VU  by RW at 11/15/2023 1413                         Point: Home exercise program (In Progress)       Learning Progress Summary             Patient Acceptance, E,TB,D, NR by  at 11/15/2023 1529    Acceptance, E,TB, VU by RW at 11/15/2023 1413    Acceptance, E,TB,D, VU,NR by  at 11/13/2023 1208    Acceptance, E,TB, VU by  at 11/12/2023 0333    Acceptance, E,TB, VU by  at 11/11/2023 0315    Acceptance, E, NR by  at 11/9/2023 0939   Other Acceptance, E,TB, VU by RW at 11/15/2023 1413                         Point: Body mechanics (In Progress)       Learning Progress Summary             Patient Acceptance, E,TB,D, NR by  at 11/15/2023 1529    Acceptance, E,TB, VU by RW at 11/15/2023 1413    Acceptance, E,TB,D, VU,NR by  at 11/13/2023 1208    Acceptance, E,TB, VU by  at 11/12/2023 0333    Acceptance, E,TB, VU by  at 11/11/2023 0315    Acceptance, E, NR by  at 11/9/2023 0939   Other Acceptance, E,TB, VU by  at 11/15/2023 1413                         Point: Precautions (In Progress)       Learning Progress Summary             Patient Acceptance, E,TB,D, NR by  at 11/15/2023 1529    Acceptance, E,TB, VU by RW at 11/15/2023 1413    Acceptance, E,TB,D, VU,NR by  at 11/13/2023 1208    Acceptance, E,TB, VU by  at 11/12/2023 0333    Acceptance, E,TB, VU by  at 11/11/2023 0315    Acceptance, E, NR by  at 11/9/2023 0939   Other Acceptance, E,TB, VU by  at 11/15/2023 1413                                         User Key       Initials Effective Dates Name Provider Type Discipline     06/16/21 -  Susan Kan, PT Physical Therapist PT     06/16/21 -  Du Moss, ROD Registered Nurse Nurse     06/16/21 -  [place]ubler, Ivy, PTA Physical Therapist Assistant PT     02/03/22 -  Brady Arenas, RN Registered Nurse Nurse                  PT Recommendation and Plan     Plan of Care Reviewed With: patient  Progress: improving  Outcome Evaluation: Pt was in bed sleeping at beg of PT tx although awoken  easily. Pt performed ther ex while in bed then sat up to EOB w/ min A and max cues for initiation and sequencing. Pt stood w/ unsteadiness after cues for widening KAYLEEN and req min A w/ use of fww. Pt amb around bed to recliner w/ freq cues to widen KAYLEEN as B feet brushed each other and pt appearing to lack safety awareness.  Pt was unsteady w/ no overt LOB and req min A w/ use of fww. Pt UIc at end of session w/ alarm set. PT will prog as pt marilee.     Time Calculation:         PT Charges       Row Name 11/15/23 1529             Time Calculation    Start Time 1432  -PH      Stop Time 1451  -PH      Time Calculation (min) 19 min  -PH      PT Received On 11/15/23  -PH      PT - Next Appointment 11/16/23  -PH         Timed Charges    33015 - PT Therapeutic Exercise Minutes 7  -PH      37573 - PT Therapeutic Activity Minutes 12  -PH         Total Minutes    Timed Charges Total Minutes 19  -PH       Total Minutes 19  -PH                User Key  (r) = Recorded By, (t) = Taken By, (c) = Cosigned By      Initials Name Provider Type    PH Ivy Horowitz PTA Physical Therapist Assistant                  Therapy Charges for Today       Code Description Service Date Service Provider Modifiers Qty    [id number]  PT THERAPEUTIC ACT EA 15 MIN 11/15/2023 Ivy Horowitz PTA GP 1            PT G-Codes  Outcome Measure Options: AM-PAC 6 Clicks Basic Mobility (PT)  AM-PAC 6 Clicks Score (PT): 17  AM-PAC 6 Clicks Score (OT): 12  PT Discharge Summary  Anticipated Discharge Disposition (PT): skilled nursing facility    Ivy Horowitz PTA  11/15/2023

## 2023-11-15 NOTE — SIGNIFICANT NOTE
11/15/23 1306   OTHER   Discipline occupational therapist   Rehab Time/Intention   Session Not Performed other (see comments)  (Pt reports he is too tired to participate in therapy in the am. OT will f/u later if available.)   Therapy Assessment/Plan (PT)   Criteria for Skilled Interventions Met (PT) yes   Recommendation   OT - Next Appointment 11/16/23

## 2023-11-16 LAB
ALBUMIN SERPL-MCNC: 3.3 G/DL (ref 3.5–5.2)
ALBUMIN/GLOB SERPL: 1 G/DL
ALP SERPL-CCNC: 63 U/L (ref 39–117)
ALT SERPL W P-5'-P-CCNC: 16 U/L (ref 1–41)
ANION GAP SERPL CALCULATED.3IONS-SCNC: 7.7 MMOL/L (ref 5–15)
AST SERPL-CCNC: 16 U/L (ref 1–40)
BASOPHILS # BLD AUTO: 0.03 10*3/MM3 (ref 0–0.2)
BASOPHILS NFR BLD AUTO: 0.5 % (ref 0–1.5)
BILIRUB SERPL-MCNC: 0.4 MG/DL (ref 0–1.2)
BUN SERPL-MCNC: 23 MG/DL (ref 8–23)
BUN/CREAT SERPL: 23.5 (ref 7–25)
CALCIUM SPEC-SCNC: 9.1 MG/DL (ref 8.6–10.5)
CHLORIDE SERPL-SCNC: 106 MMOL/L (ref 98–107)
CO2 SERPL-SCNC: 26.3 MMOL/L (ref 22–29)
CREAT SERPL-MCNC: 0.98 MG/DL (ref 0.76–1.27)
DEPRECATED RDW RBC AUTO: 44.2 FL (ref 37–54)
EGFRCR SERPLBLD CKD-EPI 2021: 78 ML/MIN/1.73
EOSINOPHIL # BLD AUTO: 0.23 10*3/MM3 (ref 0–0.4)
EOSINOPHIL NFR BLD AUTO: 3.6 % (ref 0.3–6.2)
ERYTHROCYTE [DISTWIDTH] IN BLOOD BY AUTOMATED COUNT: 12.7 % (ref 12.3–15.4)
GLOBULIN UR ELPH-MCNC: 3.2 GM/DL
GLUCOSE SERPL-MCNC: 107 MG/DL (ref 65–99)
HCT VFR BLD AUTO: 37.4 % (ref 37.5–51)
HGB BLD-MCNC: 12.8 G/DL (ref 13–17.7)
IMM GRANULOCYTES # BLD AUTO: 0.13 10*3/MM3 (ref 0–0.05)
IMM GRANULOCYTES NFR BLD AUTO: 2 % (ref 0–0.5)
LYMPHOCYTES # BLD AUTO: 1.41 10*3/MM3 (ref 0.7–3.1)
LYMPHOCYTES NFR BLD AUTO: 22 % (ref 19.6–45.3)
MCH RBC QN AUTO: 31.8 PG (ref 26.6–33)
MCHC RBC AUTO-ENTMCNC: 34.2 G/DL (ref 31.5–35.7)
MCV RBC AUTO: 92.8 FL (ref 79–97)
MONOCYTES # BLD AUTO: 0.57 10*3/MM3 (ref 0.1–0.9)
MONOCYTES NFR BLD AUTO: 8.9 % (ref 5–12)
NEUTROPHILS NFR BLD AUTO: 4.04 10*3/MM3 (ref 1.7–7)
NEUTROPHILS NFR BLD AUTO: 63 % (ref 42.7–76)
NRBC BLD AUTO-RTO: 0 /100 WBC (ref 0–0.2)
PLATELET # BLD AUTO: 335 10*3/MM3 (ref 140–450)
PMV BLD AUTO: 9.5 FL (ref 6–12)
POTASSIUM SERPL-SCNC: 3.3 MMOL/L (ref 3.5–5.2)
PROT SERPL-MCNC: 6.5 G/DL (ref 6–8.5)
RBC # BLD AUTO: 4.03 10*6/MM3 (ref 4.14–5.8)
SODIUM SERPL-SCNC: 140 MMOL/L (ref 136–145)
WBC NRBC COR # BLD: 6.41 10*3/MM3 (ref 3.4–10.8)

## 2023-11-16 PROCEDURE — 97530 THERAPEUTIC ACTIVITIES: CPT

## 2023-11-16 PROCEDURE — 80053 COMPREHEN METABOLIC PANEL: CPT | Performed by: INTERNAL MEDICINE

## 2023-11-16 PROCEDURE — 85025 COMPLETE CBC W/AUTO DIFF WBC: CPT | Performed by: INTERNAL MEDICINE

## 2023-11-16 PROCEDURE — 97535 SELF CARE MNGMENT TRAINING: CPT

## 2023-11-16 PROCEDURE — 25010000002 ENOXAPARIN PER 10 MG: Performed by: INTERNAL MEDICINE

## 2023-11-16 RX ADMIN — Medication 3 MG: at 20:49

## 2023-11-16 RX ADMIN — PANTOPRAZOLE SODIUM 40 MG: 40 TABLET, DELAYED RELEASE ORAL at 05:47

## 2023-11-16 RX ADMIN — DONEPEZIL HYDROCHLORIDE 10 MG: 10 TABLET, FILM COATED ORAL at 17:58

## 2023-11-16 RX ADMIN — ENOXAPARIN SODIUM 40 MG: 100 INJECTION SUBCUTANEOUS at 16:24

## 2023-11-16 RX ADMIN — Medication 10 ML: at 09:43

## 2023-11-16 RX ADMIN — ZINC OXIDE 1 APPLICATION: 200 OINTMENT TOPICAL at 20:49

## 2023-11-16 RX ADMIN — ZINC OXIDE 1 APPLICATION: 200 OINTMENT TOPICAL at 09:43

## 2023-11-16 RX ADMIN — AMLODIPINE BESYLATE 5 MG: 5 TABLET ORAL at 09:42

## 2023-11-16 RX ADMIN — SENNOSIDES AND DOCUSATE SODIUM 2 TABLET: 50; 8.6 TABLET ORAL at 20:49

## 2023-11-16 NOTE — PLAN OF CARE
Goal Outcome Evaluation:  Plan of Care Reviewed With: patient           Outcome Evaluation: Pt seen this am for OT tx session, oriented to person and easily agreeable to OOB act w/ some encouragement. Pt MIN A for supine to sit EOB, req increased time d/t decreased processing, and Vcs for initiation and tech. Pt MIN A w/ RWX for fxnl mob from bed to chair, ambulating for short distance around bed to chair. Pt req Vcs for proper foot and hand placement, proper foot spacing and directional cues to increase (I) and safety w/ fxnl mob during ADLs. Pt tolerated 2x STS from EOB and from chair for repositioning w/ MIN A and Rwx. While sitting in chair pt SBA for oral care and SV for washing face, req increased time and Vcs for initiation of tasks. Pt left sitting [facility] w/ Nsg providing meds. DC rec to SNF. OT will continue to monitor.      Anticipated Discharge Disposition (OT): skilled nursing facility

## 2023-11-16 NOTE — THERAPY TREATMENT NOTE
Patient Name: Jake Kan  : 1945    MRN: [id number]                              Today's Date: 2023       Admit Date: 2023    Visit Dx:     ICD-10-CM ICD-9-CM   1. Altered mental status, unspecified altered mental status type  R41.82 780.97   2. Non-traumatic rhabdomyolysis  M62.82 728.88   3. Troponin level elevated  R79.89 790.6   4. COVID-19 virus infection  U07.1 079.89     Patient Active Problem List   Diagnosis    Sciatica of right side    Gastroesophageal reflux disease without esophagitis    Abnormal EKG    Hyperlipidemia    Rosacea    Seborrheic dermatitis    Pneumonia due to COVID-19 virus    Metabolic encephalopathy    Stage 3a chronic kidney disease    Cytokine release syndrome, grade 2    Age-related cognitive decline    Acute encephalopathy    Non-traumatic rhabdomyolysis    Traumatic rhabdomyolysis    Dementia without behavioral disturbance     Past Medical History:   Diagnosis Date    Pneumonia due to COVID-19 virus 2021     Past Surgical History:   Procedure Laterality Date    HERNIA REPAIR      UMBILICAL HERNIA REPAIR N/A 2019    Procedure: excision of umbilical hernia mesh and umbilical hernia repair;  Surgeon: Luan Dowell MD;  Location: [facility] MAIN OR;  Service: General    WRIST FRACTURE SURGERY Left       General Information       Row Name 23 1130          OT Time and Intention    Document Type therapy note (daily note)  -PP     Mode of Treatment occupational therapy;individual therapy  -PP       Row Name 23 1130          General Information    Patient Profile Reviewed yes  -PP     Existing Precautions/Restrictions fall  -PP     Barriers to Rehab cognitive status  -PP       Row Name 23 1130          Cognition    Orientation Status (Cognition) oriented to;person  -PP       Row Name 23 1130          Safety Issues, Functional Mobility    Impairments Affecting Function (Mobility) balance;cognition;endurance/activity  tolerance;strength  -PP     Cognitive Impairments, Mobility Safety/Performance insight into deficits/self-awareness;safety precaution follow-through;attention;problem-solving/reasoning;judgment;sequencing abilities  -PP     Comment, Safety Issues/Impairments (Mobility) Gait belt and non skid socks worn to increase safety  -PP               User Key  (r) = Recorded By, (t) = Taken By, (c) = Cosigned By      Initials Name Provider Type    PP Nora Kumari OT Occupational Therapist                     Mobility/ADL's       Row Name 11/16/23 1131          Bed Mobility    Supine-Sit [place] (Bed Mobility) minimum assist (75% patient effort);verbal cues;nonverbal cues (demo/gesture)  -PP     Bed Mobility, Safety Issues cognitive deficits limit understanding  -PP     Assistive Device (Bed Mobility) bed rails;head of bed elevated  -PP     Comment, (Bed Mobility) Pt req increased time to complete sit to supine d/t decreased processing skills, VCs for initiation and tech. Pt left sitting [facility]IC at session end  -PP       Row Name 11/16/23 [facility]          Bed-Chair Transfer    Bed-Chair Barnard (Transfers) minimum assist (75% patient effort);verbal cues;nonverbal cues (demo/gesture)  -PP     Assistive Device (Bed-Chair Transfers) walker, front-wheeled  -PP     Comment, (Bed-Chair Transfer) Pt able to perform ambulating xfer from bed to chair w/ MIN A and Rwx  -PP       Row Name 11/16/23 1131          Sit-Stand Transfer    Sit-Stand Barnard (Transfers) minimum assist (75% patient effort);verbal cues;nonverbal cues (demo/gesture)  -PP     Assistive Device (Sit-Stand Transfers) walker, front-wheeled  -PP     Comment, (Sit-Stand Transfer) 2X STS from EOB and chair  -PP       Row Name 11/16/23 1131          Functional Mobility    Functional Mobility- Ind. Level minimum assist (75% patient effort)  -PP     Functional Mobility- Device walker, front-wheeled  -PP     Functional Mobility- Comment Pt MIN A w/ RWX for fxnl  mob from bed to chair, ambulating for short distance around bed to chair. Pt req Vcs for proper foot and hand placement, proper foot spacing and directional cues.  -PP       Row Name 11/16/23 1131          Lower Body Dressing Assessment/Training    Comment, (Lower Body Dressing) socks already donned  -PP       Row Name 11/16/23 1131          Grooming Assessment/Training    [place] Level (Grooming) grooming skills;wash face, hands;verbal cues;oral care regimen;minimum assist (75% patient effort);supervision  -PP     Position (Grooming) supported sitting  -PP     Comment, (Grooming) Sup for washing face and SBA for oral care, VCs for initiation of task  -PP               User Key  (r) = Recorded By, (t) = Taken By, (c) = Cosigned By      Initials Name Provider Type    PP Nora Kumari OT Occupational Therapist                   Obj/Interventions       Row Name 11/16/23 1151          Balance    Static Sitting Balance standby assist  -PP     Position, Sitting Balance unsupported;sitting edge of bed;sitting in chair  -PP     Static Standing Balance minimal assist  -PP     Dynamic Standing Balance minimal assist;verbal cues  -PP     Position/Device Used, Standing Balance walker, front-wheeled  -PP     Balance Interventions occupation based/functional task;sitting;standing  -PP               User Key  (r) = Recorded By, (t) = Taken By, (c) = Cosigned By      Initials Name Provider Type    PP Nora Kumari, LETICIA Occupational Therapist                   Goals/Plan    No documentation.                  Clinical Impression       Row Name 11/16/23 1152          Pain Assessment    Pretreatment Pain Rating 0/10 - no pain  -PP     Posttreatment Pain Rating 0/10 - no pain  -PP     Pre/Posttreatment Pain Comment pt had no c/o pain  -PP       Row Name 11/16/23 1152          Plan of Care Review    Plan of Care Reviewed With patient  -PP     Outcome Evaluation Pt seen this am for OT tx session, oriented to person and  easily agreeable to OOB act w/ some encouragement. Pt MIN A for supine to sit EOB, req increased time d/t decreased processing, and Vcs for initiation and tech. Pt MIN A w/ RWX for fxnl mob from bed to chair, ambulating for short distance around bed to chair. Pt req Vcs for proper foot and hand placement, proper foot spacing and directional cues to increase (I) and safety w/ fxnl mob during ADLs. Pt tolerated 2x STS from EOB and from chair for repositioning w/ MIN A and Rwx. While sitting in chair pt SBA for oral care and SV for washing face, req increased time and Vcs for initiation of tasks. Pt left sitting [facility] w/ Nsg providing meds. DC rec to SNF. OT will continue to monitor.  -PP       Row Name 11/16/23 1151          Therapy Plan Review/Discharge Plan (OT)    Anticipated Discharge Disposition (OT) skilled nursing facility  -PP       Row Name 11/16/23 1152          Vital Signs    O2 Delivery Pre Treatment room air  -PP     O2 Delivery Intra Treatment room air  -PP     O2 Delivery Post Treatment room air  -PP     Pre Patient Position Supine  -PP     Intra Patient Position Standing  -PP     Post Patient Position Sitting  -PP       Row Name 11/16/23 1152          Positioning and Restraints    Pre-Treatment Position in bed  -PP     Post Treatment Position chair  -PP     In Chair sitting;call light within reach;encouraged to call for assist;exit alarm on;with nsg  -PP               User Key  (r) = Recorded By, (t) = Taken By, (c) = Cosigned By      Initials Name Provider Type    PP Nora Kumari OT Occupational Therapist                   Outcome Measures       Row Name 11/16/23 3335          How much help from another is currently needed...    Putting on and taking off regular lower body clothing? 1  -PP     Bathing (including washing, rinsing, and drying) 2  -PP     Toileting (which includes using toilet bed pan or urinal) 1  -PP     Putting on and taking off regular upper body clothing 2  -PP     Taking care  of personal grooming (such as brushing teeth) 3  -PP     Eating meals 3  -PP     AM-PAC 6 Clicks Score (OT) 12  -PP       Row Name 11/16/23 0300          How much help from another person do you currently need...    Turning from your back to your side while in flat bed without using bedrails? 3  -JL     Moving from lying on back to sitting on the side of a flat bed without bedrails? 3  -JL     Moving to and from a bed to a chair (including a wheelchair)? 3  -JL     Standing up from a chair using your arms (e.g., wheelchair, bedside chair)? 3  -JL     Climbing 3-5 steps with a railing? 2  -JL     To walk in hospital room? 3  -JL     AM-PAC 6 Clicks Score (PT) 17  -JL     Highest Level of Mobility Goal 5 --> Static standing  -JL       Row Name 11/16/23 1154          Functional Assessment    Outcome Measure Options AM-PAC 6 Clicks Daily Activity (OT)  -PP               User Key  (r) = Recorded By, (t) = Taken By, (c) = Cosigned By      Initials Name Provider Type    Sunny Knox, RN Registered Nurse    Nora Gutierrez OT Occupational Therapist                    Occupational Therapy Education       Title: PT OT SLP Therapies (In Progress)       Topic: Occupational Therapy (Done)       Point: ADL training (Done)       Description:   Instruct learner(s) on proper safety adaptation and remediation techniques during self care or transfers.   Instruct in proper use of assistive devices.                  Learning Progress Summary             Patient Acceptance, E,TB, VU by RW at 11/15/2023 1413    Acceptance, E,TB, VU by SS at 11/12/2023 0333    Acceptance, E,TB, VU by SS at 11/11/2023 0315    Acceptance, E, VU,DU by LAMRA at 11/9/2023 1042   Other Acceptance, E,TB, VU by RW at 11/15/2023 1413                         Point: Home exercise program (Done)       Description:   Instruct learner(s) on appropriate technique for monitoring, assisting and/or progressing therapeutic exercises/activities.                  Learning  Progress Summary             Patient Acceptance, E,TB, VU by RW at 11/15/2023 1413    Acceptance, E,TB, VU by  at 11/12/2023 0333    Acceptance, E,TB, VU by  at 11/11/2023 0315    Acceptance, E, VU,DU by  at 11/9/2023 1042   Other Acceptance, E,TB, VU by RW at 11/15/2023 1413                         Point: Precautions (Done)       Description:   Instruct learner(s) on prescribed precautions during self-care and functional transfers.                  Learning Progress Summary             Patient Acceptance, E,TB, VU by RW at 11/15/2023 1413    Acceptance, E,TB, VU by  at 11/12/2023 0333    Acceptance, E,TB, VU by  at 11/11/2023 0315    Acceptance, E, VU,DU by  at 11/9/2023 1042   Other Acceptance, E,TB, VU by  at 11/15/2023 1413                                         User Key       Initials Effective Dates Name Provider Type Discipline     06/16/21 -  Du Moss, RN Registered Nurse Nurse     02/03/22 -  Brady Arenas RN Registered Nurse Nurse     09/22/22 -  Michelle Chairez OT Occupational Therapist OT                  OT Recommendation and Plan     Plan of Care Review  Plan of Care Reviewed With: patient  Outcome Evaluation: Pt seen this am for OT tx session, oriented to person and easily agreeable to OOB act w/ some encouragement. Pt MIN A for supine to sit EOB, req increased time d/t decreased processing, and Vcs for initiation and tech. Pt MIN A w/ RWX for fxnl mob from bed to chair, ambulating for short distance around bed to chair. Pt req Vcs for proper foot and hand placement, proper foot spacing and directional cues to increase (I) and safety w/ fxnl mob during ADLs. Pt tolerated 2x STS from EOB and from chair for repositioning w/ MIN A and Rwx. While sitting in chair pt SBA for oral care and SV for washing face, req increased time and Vcs for initiation of tasks. Pt left sitting [facility] w/ Nsg providing meds. DC rec to SNF. OT will continue to monitor.     Time Calculation:         Time  Calculation- OT       Row Name 11/16/23 1154             Time Calculation- OT    OT Start Time 0917  -PP      OT Stop Time 0948  -PP      OT Time Calculation (min) 31 min  -PP      Total Timed Code Minutes- OT 31 minute(s)  -PP      OT Received On 11/16/23  -PP      OT - Next Appointment 11/17/23  -PP         Timed Charges    34620 - OT Therapeutic Activity Minutes 16  -PP      24566 - OT Self Care/Mgmt Minutes 15  -PP         Total Minutes    Timed Charges Total Minutes 31  -PP       Total Minutes 31  -PP                User Key  (r) = Recorded By, (t) = Taken By, (c) = Cosigned By      Initials Name Provider Type    PP [place], [place], OT Occupational Therapist                  Therapy Charges for Today       Code Description Service Date Service Provider Modifiers Qty    94969468979 HC OT THERAPEUTIC ACT EA 15 MIN 11/16/2023 Nora Kumari, OT GO 1    32638977663 HC OT SELF CARE/MGMT/TRAIN EA 15 MIN 11/16/2023 KenyettaKinjal gonzalesia, OT GO 1                 Nora Liuon, OT  11/16/2023

## 2023-11-16 NOTE — PROGRESS NOTES
Name: Jake Kan ADMIT: 2023   : 1945  PCP: Sree Thomas MD    MRN: [id number] LOS: 8 days   AGE/SEX: 78 y.o. male  ROOM: [facility]     Subjective   Subjective   Patient resting in bed, no complaints.  He is watching TV.  He is alert and oriented to person/place but cannot tell me situation.     Objective   Objective   Vital Signs  Temp:  [97.5 °F (36.4 °C)-98.1 °F (36.7 °C)] 98.1 °F (36.7 °C)  Heart Rate:  [55-62] 55  Resp:  [18] 18  BP: (101-176)/(71-89) 101/75  SpO2:  [95 %-98 %] 95 %  on   ;   Device (Oxygen Therapy): room air  Body mass index is 22.81 kg/m².  Physical Exam  Vitals and nursing note reviewed.   Constitutional:       General: He is awake. He is not in acute distress.     Comments: Elderly, chronically ill-appearing   Cardiovascular:      Rate and Rhythm: Normal rate and regular rhythm.      Pulses: Normal pulses.      Heart sounds: Normal heart sounds.   Pulmonary:      Effort: Pulmonary effort is normal. No respiratory distress.      Breath sounds: Decreased breath sounds present.      Comments: On room air  Abdominal:      General: Bowel sounds are normal.      Palpations: Abdomen is soft.      Tenderness: There is no abdominal tenderness.   Skin:     General: Skin is warm and dry.   Neurological:      Mental Status: He is alert. Mental status is at baseline. He is confused.       Results Review     I reviewed the patient's new clinical results.  Results from last 7 days   Lab Units 23  0603 11/15/23  0541 11/14/23  0652 11/13/23  0621   WBC 10*3/mm3 6.41 6.78 5.61 5.26   HEMOGLOBIN g/dL 12.8* 13.2 12.1* 12.2*   PLATELETS 10*3/mm3 335 309 268 230     Results from last 7 days   Lab Units 23  0603 11/15/23  0541 11/14/23  0652 11/13/23  0621   SODIUM mmol/L 140 143 142 140   POTASSIUM mmol/L 3.3* 3.5 3.4* 3.7   CHLORIDE mmol/L 106 107 107 104   CO2 mmol/L 26.3 26.7 26.3 27.9   BUN mg/dL 23 16 17 19   CREATININE mg/dL 0.98 0.94 1.07 1.03   GLUCOSE mg/dL 107* 103*  "112* 103*   EGFR mL/min/1.73 78.0 81.9 70.2 73.4     Results from last 7 days   Lab Units 11/16/23  0603 11/15/23  0541 11/14/23  0652 11/13/23  0621   ALBUMIN g/dL 3.3* 3.5 3.2* 3.2*   BILIRUBIN mg/dL 0.4 0.4 0.4 0.7   ALK PHOS U/L 63 70 62 60   AST (SGOT) U/L 16 19 16 21   ALT (SGPT) U/L 16 16 12 14     Results from last 7 days   Lab Units 11/16/23  0603 11/15/23  0541 11/14/23  0652 11/13/23  0621   CALCIUM mg/dL 9.1 9.2 8.6 8.8   ALBUMIN g/dL 3.3* 3.5 3.2* 3.2*     Results from last 7 days   Lab Units 11/14/23  0652 11/12/23  0557 11/10/23  0610   PROCALCITONIN ng/mL 0.05 0.07 0.12     No results found for: \"HGBA1C\", \"POCGLU\"    No radiology results for the last day    I have personally reviewed all medications:  Scheduled Medications  amLODIPine, 5 mg, Oral, Q24H  donepezil, 10 mg, Oral, Daily With Dinner  enoxaparin, 40 mg, Subcutaneous, Q24H  hydrocortisone-bacitracin-zinc oxide-nystatin, 1 application , Topical, BID  pantoprazole, 40 mg, Oral, Q AM  senna-docusate sodium, 2 tablet, Oral, BID  sodium chloride, 10 mL, Intravenous, Q12H    Infusions     Diet  Diet: Regular/House Diet; Texture: Regular Texture (IDDSI 7); Fluid Consistency: Thin (IDDSI 0)    I have personally reviewed:  [x]  Laboratory   []  Microbiology   []  Radiology   [x]  EKG/Telemetry  []  Cardiology/Vascular   []  Pathology    []  Records       Assessment/Plan     Active Hospital Problems    Diagnosis  POA    **Pneumonia due to COVID-19 virus [U07.1, J12.82]  Yes    Traumatic rhabdomyolysis [T79.6XXA]  Unknown    Dementia without behavioral disturbance [F03.90]  Unknown    Acute encephalopathy [G93.40]  Yes    Age-related cognitive decline [R41.81]  Yes    Stage 3a chronic kidney disease [N18.31]  Yes    Gastroesophageal reflux disease without esophagitis [K21.9]  Yes    Hyperlipidemia [E78.5]  Yes      Resolved Hospital Problems   No resolved problems to display.       78 y.o. male admitted with Pneumonia due to COVID-19 " virus.    Dementia without behavioral disturbance, advanced  Metabolic Encephalopathy-related to COVID, resolved  -Patient is A&O x1-2 at baseline,  - Continue to monitor. Delirium/aspiration/fall precautions.    Traumatic Rhabdomyolysis  - Etiology secondary to prolonged time on his floor, no evidence of compartment syndrome or pigment nephropathy  -CK trended down with IVF down to 206, will DC fluids     COVID-19 Pneumonia  - Diagnosis noted on arrival. No evidence of bacterial pneumonia, patient not hypoxic so treatment with steroids not indicated. Currently on Paxlovid, tolerating well.   -Completed Paxlovid    High blood pressure/hypertension  -No previous diagnosis per patient, initiate amlodipine 5 mg    Chronic kidney disease stage 3A  -Creatinine has been stable, continue to trend on daily BMP    Anemia  - Hemoglobin low on most recent labs, however, stable from prior. No evidence of overt blood loss. No indications for acute intervention at this time.    - Order repeat CBC in AM for reassessment. Continue to monitor, transfuse for hemoglobin <7.    Debility/impairment in physical status  - PT/OT recommending SNF, CCP to see    Lovenox 40 mg SC daily for DVT prophylaxis.  Full code.  Discussed with patient and nursing staff.  Anticipate discharge to SNU facility once arrangements have been made.    Initially plan was to discharge to SNF for additional strengthening/care however patient's friend unwilling to sign paperwork for him to go to facility.  Given that the patient has no known family and friends is not willing to sign paperwork, have reached out to CCP to discuss possibility of needing guardianship.   Disposition remains to be determined given lack of safe discharge.  We will follow-up with CCP today.      Merline Ortiz MD  [facility]  11/16/23

## 2023-11-16 NOTE — PLAN OF CARE
Goal Outcome Evaluation:  Plan of Care Reviewed With: patient           Outcome Evaluation: Covid isolation. Denies pain or SOA. Room air. Oriented to self only. Worked with PT. Sat in chair. Telemetry d/c'd. Waiting for a M/S bed.

## 2023-11-16 NOTE — PLAN OF CARE
Goal Outcome Evaluation:      Pt. Alert, oriented x2, confused, noted getting out of bed with saying to go out, redirected Pt. To get in bed, Pt. Not combative, calm, compliance to get in bed, no voiced c/o pain, 02 sats 96% on room air, no short of air noted, v/s stable, no s/s acute distress noted

## 2023-11-17 PROCEDURE — 97110 THERAPEUTIC EXERCISES: CPT

## 2023-11-17 PROCEDURE — 97530 THERAPEUTIC ACTIVITIES: CPT

## 2023-11-17 PROCEDURE — 25010000002 ENOXAPARIN PER 10 MG: Performed by: INTERNAL MEDICINE

## 2023-11-17 RX ADMIN — DONEPEZIL HYDROCHLORIDE 10 MG: 10 TABLET, FILM COATED ORAL at 17:27

## 2023-11-17 RX ADMIN — AMLODIPINE BESYLATE 5 MG: 5 TABLET ORAL at 09:23

## 2023-11-17 RX ADMIN — ENOXAPARIN SODIUM 40 MG: 100 INJECTION SUBCUTANEOUS at 14:59

## 2023-11-17 RX ADMIN — ZINC OXIDE 1 APPLICATION: 200 OINTMENT TOPICAL at 09:23

## 2023-11-17 RX ADMIN — Medication 3 MG: at 21:17

## 2023-11-17 RX ADMIN — SENNOSIDES AND DOCUSATE SODIUM 2 TABLET: 50; 8.6 TABLET ORAL at 09:23

## 2023-11-17 RX ADMIN — ZINC OXIDE 1 APPLICATION: 200 OINTMENT TOPICAL at 21:17

## 2023-11-17 RX ADMIN — PANTOPRAZOLE SODIUM 40 MG: 40 TABLET, DELAYED RELEASE ORAL at 05:28

## 2023-11-17 NOTE — PLAN OF CARE
Goal Outcome Evaluation:            Pt. Alert, oriented x2, confused at times, no voiced c/o pain, Pt. Not trying to get out of bed much at this shift, slept in bed quietly until this time,

## 2023-11-17 NOTE — PROGRESS NOTES
Name: Jake Kan ADMIT: 2023   : 1945  PCP: Sree Thomas MD    MRN: [id number] LOS: 9 days   AGE/SEX: 78 y.o. male  ROOM: [facility]     Subjective   Subjective   Patient resting in recliner, no complaints.  He is watching TV. Wants to get back in bed.      Objective   Objective   Vital Signs  Temp:  [97.3 °F (36.3 °C)-98.4 °F (36.9 °C)] 98.2 °F (36.8 °C)  Heart Rate:  [63-72] 72  Resp:  [16-18] 16  BP: (110-130)/(57-88) 129/80  SpO2:  [95 %-98 %] 95 %  on   ;   Device (Oxygen Therapy): room air  Body mass index is 22.81 kg/m².  Physical Exam  Vitals and nursing note reviewed.   Constitutional:       General: He is awake. He is not in acute distress.     Comments: Elderly, chronically ill-appearing   Cardiovascular:      Rate and Rhythm: Normal rate and regular rhythm.      Pulses: Normal pulses.      Heart sounds: Normal heart sounds.   Pulmonary:      Effort: Pulmonary effort is normal. No respiratory distress.      Breath sounds: Decreased breath sounds present.      Comments: On room air  Abdominal:      General: Bowel sounds are normal.      Palpations: Abdomen is soft.      Tenderness: There is no abdominal tenderness.   Skin:     General: Skin is warm and dry.   Neurological:      Mental Status: He is alert. Mental status is at baseline. He is confused.       Results Review     I reviewed the patient's new clinical results.  Results from last 7 days   Lab Units 23  0603 11/15/23  0523   WBC 10*3/mm3 6.41 6.78 5.61 5.26   HEMOGLOBIN g/dL 12.8* 13.2 12.1* 12.2*   PLATELETS 10*3/mm3 335 309 268 230     Results from last 7 days   Lab Units 23  0603 11/15/23  0541 2321   SODIUM mmol/L 140 143 142 140   POTASSIUM mmol/L 3.3* 3.5 3.4* 3.7   CHLORIDE mmol/L 106 107 107 104   CO2 mmol/L 26.3 26.7 26.3 27.9   BUN mg/dL 23 16 17 19   CREATININE mg/dL 0.98 0.94 1.07 1.03   GLUCOSE mg/dL 107* 103* 112* 103*   EGFR mL/min/1.73 78.0  "81.9 70.2 73.4     Results from last 7 days   Lab Units 11/16/23  0603 11/15/23  0541 11/14/23  0652 11/13/23  0621   ALBUMIN g/dL 3.3* 3.5 3.2* 3.2*   BILIRUBIN mg/dL 0.4 0.4 0.4 0.7   ALK PHOS U/L 63 70 62 60   AST (SGOT) U/L 16 19 16 21   ALT (SGPT) U/L 16 16 12 14     Results from last 7 days   Lab Units 11/16/23  0603 11/15/23  0541 11/14/23  0652 11/13/23  0621   CALCIUM mg/dL 9.1 9.2 8.6 8.8   ALBUMIN g/dL 3.3* 3.5 3.2* 3.2*     Results from last 7 days   Lab Units 11/14/23  0652 11/12/23  0557   PROCALCITONIN ng/mL 0.05 0.07     No results found for: \"HGBA1C\", \"POCGLU\"    No radiology results for the last day    I have personally reviewed all medications:  Scheduled Medications  amLODIPine, 5 mg, Oral, Q24H  donepezil, 10 mg, Oral, Daily With Dinner  enoxaparin, 40 mg, Subcutaneous, Q24H  hydrocortisone-bacitracin-zinc oxide-nystatin, 1 application , Topical, BID  pantoprazole, 40 mg, Oral, Q AM  senna-docusate sodium, 2 tablet, Oral, BID    Infusions     Diet  Diet: Regular/House Diet; Texture: Regular Texture (IDDSI 7); Fluid Consistency: Thin (IDDSI 0)    I have personally reviewed:  [x]  Laboratory   []  Microbiology   []  Radiology   [x]  EKG/Telemetry  []  Cardiology/Vascular   []  Pathology    []  Records       Assessment/Plan     Active Hospital Problems    Diagnosis  POA    **Pneumonia due to COVID-19 virus [U07.1, J12.82]  Yes    Traumatic rhabdomyolysis [T79.6XXA]  Unknown    Dementia without behavioral disturbance [F03.90]  Unknown    Acute encephalopathy [G93.40]  Yes    Age-related cognitive decline [R41.81]  Yes    Stage 3a chronic kidney disease [N18.31]  Yes    Gastroesophageal reflux disease without esophagitis [K21.9]  Yes    Hyperlipidemia [E78.5]  Yes      Resolved Hospital Problems   No resolved problems to display.       78 y.o. male admitted with Pneumonia due to COVID-19 virus.    Dementia without behavioral disturbance, advanced  Metabolic Encephalopathy-related to COVID, " resolved  -Patient is A&O x1-2 at baseline,  - Continue to monitor. Delirium/aspiration/fall precautions.    Traumatic Rhabdomyolysis- resolved with IVF  - Etiology secondary to prolonged time on his floor, no evidence of compartment syndrome or pigment nephropathy     COVID-19 Pneumonia  - Diagnosis noted on arrival. No evidence of bacterial pneumonia, patient not hypoxic so treatment with steroids not indicated. Currently on Paxlovid, tolerating well.   -Completed Paxlovid    High blood pressure/hypertension  -No previous diagnosis per patient, initiate amlodipine 5 mg    Chronic kidney disease stage 3A  -Creatinine has been stable, continue to trend on daily BMP    Anemia  - Hemoglobin low on most recent labs, however, stable from prior. No evidence of overt blood loss. No indications for acute intervention at this time.    - Order repeat CBC in AM for reassessment. Continue to monitor, transfuse for hemoglobin <7.    Debility/impairment in physical status  - PT/OT recommending SNF, CCP to see    Lovenox 40 mg SC daily for DVT prophylaxis.  Full code.  Discussed with patient and nursing staff.  Anticipate discharge to SNU facility once arrangements have been made.    Initially plan was to discharge to SNF for additional strengthening/care however patient's friend unwilling to sign paperwork for him to go to facility.  Given that the patient has no known family and friends is not willing to sign paperwork, have reached out to CCP to discuss possibility of needing guardianship.   Disposition remains to be determined given lack of safe discharge.  CCP consult placed.      Merline Ortiz MD  [facility]  11/17/23

## 2023-11-17 NOTE — PLAN OF CARE
Goal Outcome Evaluation:  Plan of Care Reviewed With: patient        Progress: improving  Outcome Evaluation: Pt seen by PT this date for tx Pt continues to show improvements w/ gait of 40' today req cga and use of fww Pt still slow and shuffling w/ b feet touching at times and req cues for correction. Pt was in chair at end of session PT will prog as pt marilee.      Anticipated Discharge Disposition (PT): skilled nursing facility

## 2023-11-17 NOTE — THERAPY TREATMENT NOTE
Patient Name: Jake Kan  : 1945    MRN: [id number]                              Today's Date: 2023       Admit Date: 2023    Visit Dx:     ICD-10-CM ICD-9-CM   1. Altered mental status, unspecified altered mental status type  R41.82 780.97   2. Non-traumatic rhabdomyolysis  M62.82 728.88   3. Troponin level elevated  R79.89 790.6   4. COVID-19 virus infection  U07.1 079.89     Patient Active Problem List   Diagnosis    Sciatica of right side    Gastroesophageal reflux disease without esophagitis    Abnormal EKG    Hyperlipidemia    Rosacea    Seborrheic dermatitis    Pneumonia due to COVID-19 virus    Metabolic encephalopathy    Stage 3a chronic kidney disease    Cytokine release syndrome, grade 2    Age-related cognitive decline    Acute encephalopathy    Non-traumatic rhabdomyolysis    Traumatic rhabdomyolysis    Dementia without behavioral disturbance     Past Medical History:   Diagnosis Date    Pneumonia due to COVID-19 virus 2021     Past Surgical History:   Procedure Laterality Date    HERNIA REPAIR      UMBILICAL HERNIA REPAIR N/A 2019    Procedure: excision of umbilical hernia mesh and umbilical hernia repair;  Surgeon: Luan Dowell MD;  Location: [facility] MAIN OR;  Service: General    WRIST FRACTURE SURGERY Left       General Information       Row Name 2330          Physical Therapy Time and Intention    Document Type therapy note (daily note)  -PH     Mode of Treatment physical therapy  -PH       Row Name 2330          General Information    Existing Precautions/Restrictions fall  -PH     Barriers to Rehab cognitive status;previous functional deficit  -PH       Row Name 23          Cognition    Orientation Status (Cognition) oriented to;person  -PH       Row Name 2330          Safety Issues, Functional Mobility    Impairments Affecting Function (Mobility) balance;endurance/activity tolerance;strength;cognition  -PH      Cognitive Impairments, Mobility Safety/Performance sequencing abilities;problem-solving/reasoning;safety precaution follow-through;insight into deficits/self-awareness  -PH     Comment, Safety Issues/Impairments (Mobility) gt belt and non skid socks donned;  -PH               User Key  (r) = Recorded By, (t) = Taken By, (c) = Cosigned By      Initials Name Provider Type     Ivy Horowitz PTA Physical Therapist Assistant                   Mobility       Row Name 11/17/23 08          Bed Mobility    Bed Mobility supine-sit;sit-supine  -PH     Supine-Sit Amador (Bed Mobility) supervision  -PH     Assistive Device (Bed Mobility) bed rails;head of bed elevated  -PH     Comment, (Bed Mobility) improvement w/ level of assist to EOb  -PH       Row Name 11/17/23 [facility]          Sit-Stand Transfer    Sit-Stand Amador (Transfers) verbal cues;contact guard;1 person assist;2 person assist  -PH     Assistive Device (Sit-Stand Transfers) walker, front-wheeled  -PH     Comment, (Sit-Stand Transfer) 1x from Eob; unsteadiness when standing w/ cues for anterior lean and placing b toes on floor  -PH       Row Name 11/17/23 [facility]          Gait/Stairs (Locomotion)    Amador Level (Gait) contact guard;verbal cues;nonverbal cues (demo/gesture)  -PH     Assistive Device (Gait) walker, front-wheeled  -PH     Distance in Feet (Gait) 40' in room  -PH     Deviations/Abnormal Patterns (Gait) elijah decreased;festinating/shuffling;gait speed decreased;stride length decreased  -PH     Bilateral Gait Deviations forward flexed posture;heel strike decreased  -PH     Amador Level (Stairs) unable to assess  -PH     Comment, (Gait/Stairs) slow w/ decr step length; 2-3 cues to widen salvador w/ B feet brushing at times; no overt LOB  -PH               User Key  (r) = Recorded By, (t) = Taken By, (c) = Cosigned By      Initials Name Provider Type     Ivy Horowitz PTA Physical Therapist Assistant                    Obj/Interventions       Row Name 11/17/23 0834          Motor Skills    Therapeutic Exercise other (see comments)  BAP, LAQ, seated march, shldr flexion, punches x 10 reps all  -PH       Row Name 11/17/23 0834          Balance    Balance Assessment sitting static balance;standing static balance  -PH     Static Sitting Balance standby assist  -PH     Static Standing Balance non-verbal cues (demo/gesture);verbal cues;minimal assist;contact guard  -PH     Position/Device Used, Standing Balance walker, front-wheeled  -PH               User Key  (r) = Recorded By, (t) = Taken By, (c) = Cosigned By      Initials Name Provider Type    PH Ivy Horowitz, PTA Physical Therapist Assistant                   Goals/Plan    No documentation.                  Clinical Impression       Row Name 11/17/23 0835          Pain    Pretreatment Pain Rating 0/10 - no pain  -PH     Posttreatment Pain Rating 0/10 - no pain  -PH     Additional Documentation Pain Scale: Numbers Pre/Post-Treatment (Group)  -PH       Row Name 11/17/23 0835          Plan of Care Review    Plan of Care Reviewed With patient  -PH     Progress improving  -PH     Outcome Evaluation Pt seen by PT this date for tx Pt continues to show improvements w/ gait of 40' today req cga and use of fww Pt still slow and shuffling w/ b feet touching at times and req cues for correction. Pt was in chair at end of session PT will prog as pt marilee.  -PH       Row Name 11/17/23 0835          Vital Signs    O2 Delivery Pre Treatment room air  -PH     O2 Delivery Intra Treatment room air  -PH     O2 Delivery Post Treatment room air  -PH       Row Name 11/17/23 0835          Positioning and Restraints    Pre-Treatment Position in bed  -PH     Post Treatment Position chair  -PH     In Chair reclined;call light within reach;encouraged to call for assist;exit alarm on;notified nsg  -PH               User Key  (r) = Recorded By, (t) = Taken By, (c) = Cosigned By      Initials Name  Provider Type     Ivy Horowitz PTA Physical Therapist Assistant                   Outcome Measures       Row Name 11/17/23 0838 11/17/23 0400       How much help from another person do you currently need...    Turning from your back to your side while in flat bed without using bedrails? 4  -PH 3  -JL    Moving from lying on back to sitting on the side of a flat bed without bedrails? 3  -PH 3  -JL    Moving to and from a bed to a chair (including a wheelchair)? 3  -PH 3  -JL    Standing up from a chair using your arms (e.g., wheelchair, bedside chair)? 3  -PH 3  -JL    Climbing 3-5 steps with a railing? 2  -PH 2  -JL    To walk in hospital room? 3  -PH 3  -JL    AM-PAC 6 Clicks Score (PT) 18  -PH 17  -JL    Highest Level of Mobility Goal 6 --> Walk 10 steps or more  -PH 5 --> Static standing  -JL      Row Name 11/17/23 0838          Functional Assessment    Outcome Measure Options AM-PAC 6 Clicks Basic Mobility (PT)  -PH               User Key  (r) = Recorded By, (t) = Taken By, (c) = Cosigned By      Initials Name Provider Type     Ivy Horowitz PTA Physical Therapist Assistant    Sunny Knox, RN Registered Nurse                                 Physical Therapy Education       Title: PT OT SLP Therapies (Done)       Topic: Physical Therapy (Done)       Point: Mobility training (Done)       Learning Progress Summary             Patient Acceptance, E,TB,D, VU,NR by  at 11/17/2023 0838    Acceptance, E,TB,D, NR by  at 11/15/2023 1529    Acceptance, E,TB, VU by  at 11/15/2023 1413    Acceptance, E,TB,D, VU,NR by  at 11/13/2023 1208    Acceptance, E,TB, VU by SS at 11/12/2023 0333    Acceptance, E,TB, VU by  at 11/11/2023 0315    Acceptance, E, NR by  at 11/9/2023 0939   Other Acceptance, E,TB, VU by RW at 11/15/2023 1413                         Point: Home exercise program (Done)       Learning Progress Summary             Patient Acceptance, E,TB,D, VU,NR by  at 11/17/2023 0838     Acceptance, E,TB,D, NR by  at 11/15/2023 1529    Acceptance, E,TB, VU by RW at 11/15/2023 1413    Acceptance, E,TB,D, VU,NR by  at 11/13/2023 1208    Acceptance, E,TB, VU by SS at 11/12/2023 0333    Acceptance, E,TB, VU by  at 11/11/2023 0315    Acceptance, E, NR by  at 11/9/2023 0939   Other Acceptance, E,TB, VU by RW at 11/15/2023 1413                         Point: Body mechanics (Done)       Learning Progress Summary             Patient Acceptance, E,TB,D, VU,NR by  at 11/17/2023 0838    Acceptance, E,TB,D, NR by  at 11/15/2023 1529    Acceptance, E,TB, VU by RW at 11/15/2023 1413    Acceptance, E,TB,D, VU,NR by  at 11/13/2023 1208    Acceptance, E,TB, VU by SS at 11/12/2023 0333    Acceptance, E,TB, VU by  at 11/11/2023 0315    Acceptance, E, NR by  at 11/9/2023 0939   Other Acceptance, E,TB, VU by  at 11/15/2023 1413                         Point: Precautions (Done)       Learning Progress Summary             Patient Acceptance, E,TB,D, VU,NR by  at 11/17/2023 0838    Acceptance, E,TB,D, NR by  at 11/15/2023 1529    Acceptance, E,TB, VU by RW at 11/15/2023 1413    Acceptance, E,TB,D, VU,NR by  at 11/13/2023 1208    Acceptance, E,TB, VU by  at 11/12/2023 0333    Acceptance, E,TB, VU by  at 11/11/2023 0315    Acceptance, E, NR by  at 11/9/2023 0939   Other Acceptance, E,TB, VU by  at 11/15/2023 1413                                         User Key       Initials Effective Dates Name Provider Type Discipline     06/16/21 -  Susan Kan PT Physical Therapist PT     06/16/21 -  Du Moss RN Registered Nurse Nurse     06/16/21 -  Ivy Horowitz PTA Physical Therapist Assistant PT    SS 02/03/22 -  Brady Arenas, RN Registered Nurse Nurse                  PT Recommendation and Plan     Plan of Care Reviewed With: patient  Progress: improving  Outcome Evaluation: Pt seen by PT this date for tx Pt continues to show improvements w/ gait of 40' today req cga and use  of fww Pt still slow and shuffling w/ b feet touching at times and req cues for correction. Pt was in chair at end of session PT will prog as pt marilee.     Time Calculation:         PT Charges       Row Name 11/17/23 0839             Time Calculation    Start Time 0815  -PH      Stop Time 0840  -PH      Time Calculation (min) 25 min  -PH      PT Received On 11/17/23  -PH      PT - Next Appointment 11/20/23  -PH         Timed Charges    50728 - PT Therapeutic Exercise Minutes 7  -PH      92069 - PT Therapeutic Activity Minutes 18  -PH         Total Minutes    Timed Charges Total Minutes 25  -PH       Total Minutes 25  -PH                User Key  (r) = Recorded By, (t) = Taken By, (c) = Cosigned By      Initials Name Provider Type    PH Ivy Horowitz PTA Physical Therapist Assistant                  Therapy Charges for Today       Code Description Service Date Service Provider Modifiers Qty    17150529420 HC PT THER PROC EA 15 MIN 11/17/2023 Ivy Horowitz PTA GP 1    99610393579 HC PT THERAPEUTIC ACT EA 15 MIN 11/17/2023 Ivy Horowitz PTA GP 1            PT G-Codes  Outcome Measure Options: AM-PAC 6 Clicks Basic Mobility (PT)  AM-PAC 6 Clicks Score (PT): 18  AM-PAC 6 Clicks Score (OT): 12  PT Discharge Summary  Anticipated Discharge Disposition (PT): skilled nursing facility    Ivy Horowitz PTA  11/17/2023

## 2023-11-17 NOTE — PLAN OF CARE
Goal Outcome Evaluation:  Plan of Care Reviewed With: patient, friend           Outcome Evaluation: Covid isolation. Room air. Oriented charli self only. Cooperative. Worked with PT. Sat up in chair. Waiting on M/S bed.

## 2023-11-18 LAB
ALBUMIN SERPL-MCNC: 3.3 G/DL (ref 3.5–5.2)
ALBUMIN/GLOB SERPL: 1.1 G/DL
ALP SERPL-CCNC: 62 U/L (ref 39–117)
ALT SERPL W P-5'-P-CCNC: 24 U/L (ref 1–41)
ANION GAP SERPL CALCULATED.3IONS-SCNC: 8.6 MMOL/L (ref 5–15)
AST SERPL-CCNC: 27 U/L (ref 1–40)
BILIRUB SERPL-MCNC: 0.3 MG/DL (ref 0–1.2)
BUN SERPL-MCNC: 25 MG/DL (ref 8–23)
BUN/CREAT SERPL: 26.3 (ref 7–25)
CALCIUM SPEC-SCNC: 9.1 MG/DL (ref 8.6–10.5)
CHLORIDE SERPL-SCNC: 109 MMOL/L (ref 98–107)
CO2 SERPL-SCNC: 25.4 MMOL/L (ref 22–29)
CREAT SERPL-MCNC: 0.95 MG/DL (ref 0.76–1.27)
DEPRECATED RDW RBC AUTO: 43.4 FL (ref 37–54)
EGFRCR SERPLBLD CKD-EPI 2021: 80.9 ML/MIN/1.73
ERYTHROCYTE [DISTWIDTH] IN BLOOD BY AUTOMATED COUNT: 12.8 % (ref 12.3–15.4)
GLOBULIN UR ELPH-MCNC: 3.1 GM/DL
GLUCOSE SERPL-MCNC: 95 MG/DL (ref 65–99)
HCT VFR BLD AUTO: 37.2 % (ref 37.5–51)
HGB BLD-MCNC: 12.7 G/DL (ref 13–17.7)
MCH RBC QN AUTO: 31.8 PG (ref 26.6–33)
MCHC RBC AUTO-ENTMCNC: 34.1 G/DL (ref 31.5–35.7)
MCV RBC AUTO: 93 FL (ref 79–97)
PLATELET # BLD AUTO: 339 10*3/MM3 (ref 140–450)
PMV BLD AUTO: 9.6 FL (ref 6–12)
POTASSIUM SERPL-SCNC: 3.4 MMOL/L (ref 3.5–5.2)
PROT SERPL-MCNC: 6.4 G/DL (ref 6–8.5)
RBC # BLD AUTO: 4 10*6/MM3 (ref 4.14–5.8)
SODIUM SERPL-SCNC: 143 MMOL/L (ref 136–145)
WBC NRBC COR # BLD AUTO: 5.29 10*3/MM3 (ref 3.4–10.8)

## 2023-11-18 PROCEDURE — 80053 COMPREHEN METABOLIC PANEL: CPT | Performed by: STUDENT IN AN ORGANIZED HEALTH CARE EDUCATION/TRAINING PROGRAM

## 2023-11-18 PROCEDURE — 85027 COMPLETE CBC AUTOMATED: CPT | Performed by: STUDENT IN AN ORGANIZED HEALTH CARE EDUCATION/TRAINING PROGRAM

## 2023-11-18 PROCEDURE — 25010000002 ENOXAPARIN PER 10 MG: Performed by: INTERNAL MEDICINE

## 2023-11-18 RX ORDER — POTASSIUM CHLORIDE 1.5 G/1.58G
40 POWDER, FOR SOLUTION ORAL ONCE
Status: COMPLETED | OUTPATIENT
Start: 2023-11-19 | End: 2023-11-19

## 2023-11-18 RX ADMIN — ZINC OXIDE 1 APPLICATION: 200 OINTMENT TOPICAL at 09:54

## 2023-11-18 RX ADMIN — PANTOPRAZOLE SODIUM 40 MG: 40 TABLET, DELAYED RELEASE ORAL at 06:07

## 2023-11-18 RX ADMIN — ZINC OXIDE 1 APPLICATION: 200 OINTMENT TOPICAL at 21:38

## 2023-11-18 RX ADMIN — AMLODIPINE BESYLATE 5 MG: 5 TABLET ORAL at 09:54

## 2023-11-18 RX ADMIN — DONEPEZIL HYDROCHLORIDE 10 MG: 10 TABLET, FILM COATED ORAL at 22:37

## 2023-11-18 RX ADMIN — ENOXAPARIN SODIUM 40 MG: 100 INJECTION SUBCUTANEOUS at 15:15

## 2023-11-18 RX ADMIN — SENNOSIDES AND DOCUSATE SODIUM 2 TABLET: 50; 8.6 TABLET ORAL at 09:54

## 2023-11-18 NOTE — PLAN OF CARE
Pt oriented x2, NAD, vs stable isol, infreq cough, up to bsc BM x1      Problem: Adult Inpatient Plan of Care  Goal: Plan of Care Review  Outcome: Ongoing, Progressing  Goal: Patient-Specific Goal (Individualized)  Outcome: Ongoing, Progressing  Goal: Absence of Hospital-Acquired Illness or Injury  Outcome: Ongoing, Progressing  Intervention: Identify and Manage Fall Risk  Recent Flowsheet Documentation  Taken 11/18/2023 1400 by Rosana Mcgill RN  Safety Promotion/Fall Prevention:   activity supervised   assistive device/personal items within reach   clutter free environment maintained   fall prevention program maintained   room organization consistent   safety round/check completed   nonskid shoes/slippers when out of bed  Taken 11/18/2023 0950 by Rosana Mcgill RN  Safety Promotion/Fall Prevention:   activity supervised   assistive device/personal items within reach   clutter free environment maintained   fall prevention program maintained   nonskid shoes/slippers when out of bed   room organization consistent   safety round/check completed  Intervention: Prevent Skin Injury  Recent Flowsheet Documentation  Taken 11/18/2023 1600 by Rosana Mcgill RN  Body Position: position changed independently  Taken 11/18/2023 1400 by Rosana Mcgill RN  Body Position: position changed independently  Skin Protection: adhesive use limited  Taken 11/18/2023 1200 by Rosana Mcgill RN  Body Position: position changed independently  Taken 11/18/2023 1000 by Rosana Mcgill RN  Body Position: position changed independently  Taken 11/18/2023 0950 by Rosana Mcgill RN  Body Position: position changed independently  Skin Protection: adhesive use limited  Intervention: Prevent and Manage VTE (Venous Thromboembolism) Risk  Recent Flowsheet Documentation  Taken 11/18/2023 1600 by Rosana Mcgill RN  Activity Management: up to bedside commode  Taken 11/18/2023 1400 by Rosana Mcgill RN  Activity Management: bedrest  Taken  11/18/2023 0950 by Rosana Mcgill RN  Activity Management: bedrest  VTE Prevention/Management: patient refused intervention  Range of Motion: active ROM (range of motion) encouraged  Intervention: Prevent Infection  Recent Flowsheet Documentation  Taken 11/18/2023 1400 by Rosana Mcgill RN  Infection Prevention:   rest/sleep promoted   single patient room provided  Taken 11/18/2023 0950 by Rosana Mcgill RN  Infection Prevention:   rest/sleep promoted   single patient room provided  Goal: Optimal Comfort and Wellbeing  Outcome: Ongoing, Progressing  Intervention: Provide Person-Centered Care  Recent Flowsheet Documentation  Taken 11/18/2023 1400 by Rosana Mcgill RN  Trust Relationship/Rapport: care explained  Taken 11/18/2023 0950 by Rosana Mcgill RN  Trust Relationship/Rapport:   care explained   thoughts/feelings acknowledged   reassurance provided   questions encouraged   questions answered   emotional support provided  Goal: Readiness for Transition of Care  Outcome: Ongoing, Progressing     Problem: Thought Process Alteration  Goal: Optimal Thought Clarity  Outcome: Ongoing, Progressing  Intervention: Minimize Safety Risk and Altered Thought  Recent Flowsheet Documentation  Taken 11/18/2023 1400 by Rosana Mcgill RN  Enhanced Safety Measures: bed alarm set  Taken 11/18/2023 0950 by Rosana Mcgill RN  Enhanced Safety Measures: bed alarm set     Problem: Skin Injury Risk Increased  Goal: Skin Health and Integrity  Outcome: Ongoing, Progressing  Intervention: Optimize Skin Protection  Recent Flowsheet Documentation  Taken 11/18/2023 1600 by Rosana Mcgill RN  Head of Bed (HOB) Positioning: HOB elevated  Taken 11/18/2023 1400 by Rosana Mcgill RN  Pressure Reduction Techniques: frequent weight shift encouraged  Head of Bed (HOB) Positioning: HOB elevated  Pressure Reduction Devices: pressure-redistributing mattress utilized  Skin Protection: adhesive use limited  Taken 11/18/2023 1200 by  Rosana Mcgill RN  Head of Bed (HOB) Positioning: HOB elevated  Taken 11/18/2023 1000 by Rosana Mcgill RN  Head of Bed (HOB) Positioning: HOB elevated  Taken 11/18/2023 0950 by Rosana Mcgill RN  Pressure Reduction Techniques: weight shift assistance provided  Head of Bed (HOB) Positioning: HOB elevated  Pressure Reduction Devices: pressure-redistributing mattress utilized  Skin Protection: adhesive use limited     Problem: Fall Injury Risk  Goal: Absence of Fall and Fall-Related Injury  Outcome: Ongoing, Progressing  Intervention: Identify and Manage Contributors  Recent Flowsheet Documentation  Taken 11/18/2023 1400 by Rosana Mcgill RN  Medication Review/Management: medications reviewed  Self-Care Promotion:   independence encouraged   BADL personal objects within reach   BADL personal routines maintained  Taken 11/18/2023 0950 by Rosana Mcgill RN  Medication Review/Management: medications reviewed  Self-Care Promotion:   independence encouraged   BADL personal objects within reach   BADL personal routines maintained  Intervention: Promote Injury-Free Environment  Recent Flowsheet Documentation  Taken 11/18/2023 1400 by Rosana Mcgill RN  Safety Promotion/Fall Prevention:   activity supervised   assistive device/personal items within reach   clutter free environment maintained   fall prevention program maintained   room organization consistent   safety round/check completed   nonskid shoes/slippers when out of bed  Taken 11/18/2023 0950 by Rosana Mcgill RN  Safety Promotion/Fall Prevention:   activity supervised   assistive device/personal items within reach   clutter free environment maintained   fall prevention program maintained   nonskid shoes/slippers when out of bed   room organization consistent   safety round/check completed     Problem: Fluid Imbalance (Pneumonia)  Goal: Fluid Balance  Outcome: Ongoing, Progressing     Problem: Infection (Pneumonia)  Goal: Resolution of Infection Signs  and Symptoms  Outcome: Ongoing, Progressing  Intervention: Prevent Infection Progression  Recent Flowsheet Documentation  Taken 11/18/2023 1400 by Rosana Mcgill RN  Isolation Precautions:   precautions maintained   enhanced contact  Taken 11/18/2023 0950 by Rosana Mcgill RN  Isolation Precautions:   precautions maintained   enhanced contact     Problem: Respiratory Compromise (Pneumonia)  Goal: Effective Oxygenation and Ventilation  Outcome: Ongoing, Progressing  Intervention: Promote Airway Secretion Clearance  Recent Flowsheet Documentation  Taken 11/18/2023 1400 by Rosana Mcgill RN  Cough And Deep Breathing: done independently per patient  Taken 11/18/2023 0950 by Rosana Mcgill RN  Cough And Deep Breathing: done independently per patient  Intervention: Optimize Oxygenation and Ventilation  Recent Flowsheet Documentation  Taken 11/18/2023 1600 by Rosana Mcgill RN  Head of Bed (HOB) Positioning: HOB elevated  Taken 11/18/2023 1400 by Rosana Mcgill RN  Head of Bed (HOB) Positioning: HOB elevated  Taken 11/18/2023 1200 by Rosana Mcgill RN  Head of Bed (HOB) Positioning: HOB elevated  Taken 11/18/2023 1000 by Rosana Mcgill RN  Head of Bed (HOB) Positioning: HOB elevated  Taken 11/18/2023 0950 by Rosana Mcgill RN  Head of Bed (HOB) Positioning: HOB elevated   Goal Outcome Evaluation:

## 2023-11-18 NOTE — PROGRESS NOTES
[facility]     LOS: 10 days     Subjective:    CC:Weakness - Generalized    DIET:  Diet Order   Procedures    Diet: Regular/House Diet; Texture: Regular Texture (IDDSI 7); Fluid Consistency: Thin (IDDSI 0)     D/w his nurse  He is tearful at times during our conversaton, he says he has been in the hospital for 6 days     Objective:    Vital Signs:  Temp:  [97 °F (36.1 °C)-98.2 °F (36.8 °C)] 97.3 °F (36.3 °C)  Heart Rate:  [51-72] 59  Resp:  [16] 16  BP: (115-143)/(65-80) 143/79    SpO2:  [95 %-99 %] 98 %  on   ;   Device (Oxygen Therapy): room air  Body mass index is 22.81 kg/m².    Physical Exam  Constitutional:       Appearance: Normal appearance.   HENT:      Head: Normocephalic and atraumatic.   Cardiovascular:      Rate and Rhythm: Normal rate and regular rhythm.      Heart sounds: No murmur heard.     No friction rub.   Pulmonary:      Effort: Pulmonary effort is normal.      Breath sounds: Normal breath sounds.   Abdominal:      General: Bowel sounds are normal. There is no distension.      Palpations: Abdomen is soft.      Tenderness: There is no abdominal tenderness.   Skin:     General: Skin is warm and dry.   Neurological:      General: No focal deficit present.      Mental Status: He is alert.         Results Review:    Glucose   Date Value Ref Range Status   11/18/2023 95 65 - 99 mg/dL Final   11/16/2023 107 (H) 65 - 99 mg/dL Final     Results from last 7 days   Lab Units 11/18/23  0552   WBC 10*3/mm3 5.29   HEMOGLOBIN g/dL 12.7*   HEMATOCRIT % 37.2*   PLATELETS 10*3/mm3 339     Results from last 7 days   Lab Units 11/18/23  0552   SODIUM mmol/L 143   POTASSIUM mmol/L 3.4*   CHLORIDE mmol/L 109*   CO2 mmol/L 25.4   BUN mg/dL 25*   CREATININE mg/dL 0.95   CALCIUM mg/dL 9.1   BILIRUBIN mg/dL 0.3   ALK PHOS U/L 62   ALT (SGPT) U/L 24   AST (SGOT) U/L 27   GLUCOSE mg/dL 95             Results from last 7 days   Lab Units 11/13/23  0621 11/12/23  0557   CK TOTAL U/L 206* 390*  "    Cultures:  No results found for: \"BLOODCX\", \"URINECX\", \"WOUNDCX\", \"MRSACX\", \"RESPCX\", \"STOOLCX\"    I have reviewed daily medications and changes in CPOE    Scheduled meds  amLODIPine, 5 mg, Oral, Q24H  donepezil, 10 mg, Oral, Daily With Dinner  enoxaparin, 40 mg, Subcutaneous, Q24H  hydrocortisone-bacitracin-zinc oxide-nystatin, 1 application , Topical, BID  pantoprazole, 40 mg, Oral, Q AM  senna-docusate sodium, 2 tablet, Oral, BID           PRN meds    senna-docusate sodium **AND** polyethylene glycol **AND** bisacodyl **AND** bisacodyl    melatonin    nitroglycerin    [COMPLETED] Insert Peripheral IV **AND** sodium chloride    sodium chloride    sodium chloride        Pneumonia due to COVID-19 virus    Gastroesophageal reflux disease without esophagitis    Hyperlipidemia    Stage 3a chronic kidney disease    Age-related cognitive decline    Acute encephalopathy    Traumatic rhabdomyolysis    Dementia without behavioral disturbance        Assessment/Plan:      78 y.o. male admitted with Pneumonia due to COVID-19 virus.     Dementia without behavioral disturbance, advanced  Metabolic Encephalopathy-related to COVID, resolved  -Patient is A&O x1-2 at baseline,  - Continue to monitor. Delirium/aspiration/fall precautions.     Traumatic Rhabdomyolysis- resolved with IVF  - Etiology secondary to prolonged time on his floor, no evidence of compartment syndrome or pigment nephropathy     COVID-19 Pneumonia  - Diagnosis noted on arrival. No evidence of bacterial pneumonia, patient not hypoxic so treatment with steroids not indicated. Currently on Paxlovid, tolerating well.   -Completed Paxlovid     High blood pressure/hypertension  -No previous diagnosis per patient, initiate amlodipine 5 mg     Chronic kidney disease stage 3A  -Creatinine has been stable, continue to trend on daily BMP     Anemia  - Hemoglobin low on most recent labs, however, stable from prior. No evidence of overt blood loss. No indications for " acute intervention at this time.    - Order repeat CBC in AM for reassessment. Continue to monitor, transfuse for hemoglobin <7.     Debility/impairment in physical status  - PT/OT recommending SNF, CCP to see     Lovenox 40 mg SC daily for DVT prophylaxis.  Full code.  Discussed with patient and nursing staff.  Anticipate discharge to SNU facility once arrangements have been made.     D/w nurse  Copied text reviewed      Benjamin Kiran MD  11/18/23  11:57 EST

## 2023-11-18 NOTE — PLAN OF CARE
Goal Outcome Evaluation:      Pt resting in bed , Ao to self , turn q 2 , VSS, No sign of acute distress noted. Plan of care is ongoing.

## 2023-11-19 LAB
ANION GAP SERPL CALCULATED.3IONS-SCNC: 8.1 MMOL/L (ref 5–15)
BUN SERPL-MCNC: 23 MG/DL (ref 8–23)
BUN/CREAT SERPL: 22.3 (ref 7–25)
CALCIUM SPEC-SCNC: 9 MG/DL (ref 8.6–10.5)
CHLORIDE SERPL-SCNC: 108 MMOL/L (ref 98–107)
CO2 SERPL-SCNC: 26.9 MMOL/L (ref 22–29)
CREAT SERPL-MCNC: 1.03 MG/DL (ref 0.76–1.27)
EGFRCR SERPLBLD CKD-EPI 2021: 73.4 ML/MIN/1.73
GLUCOSE SERPL-MCNC: 109 MG/DL (ref 65–99)
POTASSIUM SERPL-SCNC: 3.3 MMOL/L (ref 3.5–5.2)
POTASSIUM SERPL-SCNC: 3.9 MMOL/L (ref 3.5–5.2)
SODIUM SERPL-SCNC: 143 MMOL/L (ref 136–145)

## 2023-11-19 PROCEDURE — 25010000002 ENOXAPARIN PER 10 MG: Performed by: INTERNAL MEDICINE

## 2023-11-19 PROCEDURE — 84132 ASSAY OF SERUM POTASSIUM: CPT | Performed by: HOSPITALIST

## 2023-11-19 PROCEDURE — 80048 BASIC METABOLIC PNL TOTAL CA: CPT | Performed by: HOSPITALIST

## 2023-11-19 RX ORDER — POTASSIUM CHLORIDE 1.5 G/1.58G
20 POWDER, FOR SOLUTION ORAL ONCE
Status: COMPLETED | OUTPATIENT
Start: 2023-11-19 | End: 2023-11-19

## 2023-11-19 RX ADMIN — ZINC OXIDE 1 APPLICATION: 200 OINTMENT TOPICAL at 21:41

## 2023-11-19 RX ADMIN — ENOXAPARIN SODIUM 40 MG: 100 INJECTION SUBCUTANEOUS at 13:13

## 2023-11-19 RX ADMIN — POTASSIUM CHLORIDE 40 MEQ: 1.5 FOR SOLUTION ORAL at 08:58

## 2023-11-19 RX ADMIN — DONEPEZIL HYDROCHLORIDE 10 MG: 10 TABLET, FILM COATED ORAL at 17:06

## 2023-11-19 RX ADMIN — POTASSIUM CHLORIDE 20 MEQ: 1.5 FOR SOLUTION ORAL at 14:20

## 2023-11-19 RX ADMIN — PANTOPRAZOLE SODIUM 40 MG: 40 TABLET, DELAYED RELEASE ORAL at 08:58

## 2023-11-19 RX ADMIN — AMLODIPINE BESYLATE 5 MG: 5 TABLET ORAL at 08:58

## 2023-11-19 NOTE — PLAN OF CARE
Problem: Adult Inpatient Plan of Care  Goal: Plan of Care Review  Outcome: Ongoing, Progressing   Goal Outcome Evaluation:  Pt transferred from telemetry unit. Pt here with PNA, positive for COVID. VSS. RA. Alert to self and place at this time. Pt rested the rest of the night with no issues or complaints.

## 2023-11-19 NOTE — PROGRESS NOTES
[facility]     LOS: 11 days     Subjective:    CC:Weakness - Generalized    DIET:  Diet Order   Procedures    Diet: Regular/House Diet; Texture: Regular Texture (IDDSI 7); Fluid Consistency: Thin (IDDSI 0)     D/w his nurse  He is calm today, seems confused concerning the specifics of what has him in the hosptial    Objective:    Vital Signs:  Temp:  [97.2 °F (36.2 °C)-98.1 °F (36.7 °C)] 97.5 °F (36.4 °C)  Heart Rate:  [63-71] 70  Resp:  [16-18] 16  BP: (112-150)/(65-83) 112/70    SpO2:  [96 %-98 %] 97 %  on   ;   Device (Oxygen Therapy): room air  Body mass index is 22.81 kg/m².    Physical Exam  Constitutional:       Appearance: Normal appearance.   HENT:      Head: Normocephalic and atraumatic.   Cardiovascular:      Rate and Rhythm: Normal rate and regular rhythm.      Heart sounds: No murmur heard.     No friction rub.   Pulmonary:      Effort: Pulmonary effort is normal.      Breath sounds: Normal breath sounds.   Abdominal:      General: Bowel sounds are normal. There is no distension.      Palpations: Abdomen is soft.      Tenderness: There is no abdominal tenderness.   Skin:     General: Skin is warm and dry.   Neurological:      General: No focal deficit present.      Mental Status: He is alert.         Results Review:    Glucose   Date Value Ref Range Status   11/19/2023 109 (H) 65 - 99 mg/dL Final   11/18/2023 95 65 - 99 mg/dL Final     Results from last 7 days   Lab Units 11/18/23  0552   WBC 10*3/mm3 5.29   HEMOGLOBIN g/dL 12.7*   HEMATOCRIT % 37.2*   PLATELETS 10*3/mm3 339     Results from last 7 days   Lab Units 11/19/23  1438 11/19/23  0544 11/18/23  0552   SODIUM mmol/L  --  143 143   POTASSIUM mmol/L 3.9 3.3* 3.4*   CHLORIDE mmol/L  --  108* 109*   CO2 mmol/L  --  26.9 25.4   BUN mg/dL  --  23 25*   CREATININE mg/dL  --  1.03 0.95   CALCIUM mg/dL  --  9.0 9.1   BILIRUBIN mg/dL  --   --  0.3   ALK PHOS U/L  --   --  62   ALT (SGPT) U/L  --   --  24   AST (SGOT) U/L  --  "  --  27   GLUCOSE mg/dL  --  109* 95             Results from last 7 days   Lab Units 11/13/23  0621   CK TOTAL U/L 206*     Cultures:  No results found for: \"BLOODCX\", \"URINECX\", \"WOUNDCX\", \"MRSACX\", \"RESPCX\", \"STOOLCX\"    I have reviewed daily medications and changes in CPOE    Scheduled meds  amLODIPine, 5 mg, Oral, Q24H  donepezil, 10 mg, Oral, Daily With Dinner  enoxaparin, 40 mg, Subcutaneous, Q24H  hydrocortisone-bacitracin-zinc oxide-nystatin, 1 application , Topical, BID  pantoprazole, 40 mg, Oral, Q AM  senna-docusate sodium, 2 tablet, Oral, BID           PRN meds    senna-docusate sodium **AND** polyethylene glycol **AND** bisacodyl **AND** bisacodyl    melatonin    nitroglycerin    [COMPLETED] Insert Peripheral IV **AND** sodium chloride    sodium chloride    sodium chloride        Pneumonia due to COVID-19 virus    Gastroesophageal reflux disease without esophagitis    Hyperlipidemia    Stage 3a chronic kidney disease    Age-related cognitive decline    Acute encephalopathy    Traumatic rhabdomyolysis    Dementia without behavioral disturbance        Assessment/Plan:      78 y.o. male admitted with Pneumonia due to COVID-19 virus.     Dementia without behavioral disturbance, advanced  Metabolic Encephalopathy-related to COVID, resolved  -Patient is A&O x1-2 at baseline,  - Continue to monitor. Delirium/aspiration/fall precautions.     Traumatic Rhabdomyolysis- resolved with IVF  - Etiology secondary to prolonged time on his floor, no evidence of compartment syndrome or pigment nephropathy     COVID-19 Pneumonia  - Diagnosis noted on arrival. No evidence of bacterial pneumonia, patient not hypoxic so treatment with steroids not indicated. Currently on Paxlovid, tolerating well.   -Completed Paxlovid     High blood pressure/hypertension  -No previous diagnosis per patient, initiate amlodipine 5 mg     Chronic kidney disease stage 3A  -Creatinine has been stable, continue to trend on daily BMP   "   Anemia  - Hemoglobin low on most recent labs, however, stable from prior. No evidence of overt blood loss. No indications for acute intervention at this time.    - Order repeat CBC in AM for reassessment. Continue to monitor, transfuse for hemoglobin <7.     Debility/impairment in physical status  - PT/OT recommending SNF, CCP to see     Lovenox 40 mg SC daily for DVT prophylaxis.  Full code.  Anticipate discharge to SNU facility once arrangements have been made.     D/w nurse      Benjamin Kiran MD  11/19/23  17:25 EST

## 2023-11-19 NOTE — PLAN OF CARE
Goal Outcome Evaluation:  Plan of Care Reviewed With: patient        Progress: improving  Outcome Evaluation: VSS. NVI. covid isolation maintianed. AOx3 - disoriented to time, some illogical speaking at times. x2 assist BRP, urinal. no complaints of pain this shift. regular diet. RA. incontient of urine and bowel at times. pills whole with water. no IV - physician okay with this. plan to D/c when medically stable.

## 2023-11-20 ENCOUNTER — APPOINTMENT (OUTPATIENT)
Dept: MRI IMAGING | Facility: HOSPITAL | Age: 78
End: 2023-11-20
Payer: MEDICARE

## 2023-11-20 LAB
ALBUMIN SERPL-MCNC: 3.3 G/DL (ref 3.5–5.2)
ALBUMIN/GLOB SERPL: 1.1 G/DL
ALP SERPL-CCNC: 67 U/L (ref 39–117)
ALT SERPL W P-5'-P-CCNC: 24 U/L (ref 1–41)
ANION GAP SERPL CALCULATED.3IONS-SCNC: 6.6 MMOL/L (ref 5–15)
AST SERPL-CCNC: 23 U/L (ref 1–40)
BILIRUB SERPL-MCNC: 0.4 MG/DL (ref 0–1.2)
BUN SERPL-MCNC: 21 MG/DL (ref 8–23)
BUN/CREAT SERPL: 19.1 (ref 7–25)
CALCIUM SPEC-SCNC: 9.2 MG/DL (ref 8.6–10.5)
CHLORIDE SERPL-SCNC: 107 MMOL/L (ref 98–107)
CO2 SERPL-SCNC: 26.4 MMOL/L (ref 22–29)
CREAT SERPL-MCNC: 1.1 MG/DL (ref 0.76–1.27)
DEPRECATED RDW RBC AUTO: 43.6 FL (ref 37–54)
EGFRCR SERPLBLD CKD-EPI 2021: 67.9 ML/MIN/1.73
ERYTHROCYTE [DISTWIDTH] IN BLOOD BY AUTOMATED COUNT: 12.5 % (ref 12.3–15.4)
GLOBULIN UR ELPH-MCNC: 3 GM/DL
GLUCOSE SERPL-MCNC: 105 MG/DL (ref 65–99)
HCT VFR BLD AUTO: 40 % (ref 37.5–51)
HGB BLD-MCNC: 13.6 G/DL (ref 13–17.7)
MCH RBC QN AUTO: 32 PG (ref 26.6–33)
MCHC RBC AUTO-ENTMCNC: 34 G/DL (ref 31.5–35.7)
MCV RBC AUTO: 94.1 FL (ref 79–97)
PLATELET # BLD AUTO: 345 10*3/MM3 (ref 140–450)
PMV BLD AUTO: 9.9 FL (ref 6–12)
POTASSIUM SERPL-SCNC: 3.7 MMOL/L (ref 3.5–5.2)
PROT SERPL-MCNC: 6.3 G/DL (ref 6–8.5)
RBC # BLD AUTO: 4.25 10*6/MM3 (ref 4.14–5.8)
SODIUM SERPL-SCNC: 140 MMOL/L (ref 136–145)
WBC NRBC COR # BLD AUTO: 7.12 10*3/MM3 (ref 3.4–10.8)

## 2023-11-20 PROCEDURE — 25010000002 ENOXAPARIN PER 10 MG: Performed by: INTERNAL MEDICINE

## 2023-11-20 PROCEDURE — 97110 THERAPEUTIC EXERCISES: CPT

## 2023-11-20 PROCEDURE — 80053 COMPREHEN METABOLIC PANEL: CPT | Performed by: STUDENT IN AN ORGANIZED HEALTH CARE EDUCATION/TRAINING PROGRAM

## 2023-11-20 PROCEDURE — 85027 COMPLETE CBC AUTOMATED: CPT | Performed by: STUDENT IN AN ORGANIZED HEALTH CARE EDUCATION/TRAINING PROGRAM

## 2023-11-20 PROCEDURE — 97530 THERAPEUTIC ACTIVITIES: CPT

## 2023-11-20 RX ADMIN — ZINC OXIDE 1 APPLICATION: 200 OINTMENT TOPICAL at 08:29

## 2023-11-20 RX ADMIN — ENOXAPARIN SODIUM 40 MG: 100 INJECTION SUBCUTANEOUS at 14:42

## 2023-11-20 RX ADMIN — SENNOSIDES AND DOCUSATE SODIUM 2 TABLET: 50; 8.6 TABLET ORAL at 08:29

## 2023-11-20 RX ADMIN — PANTOPRAZOLE SODIUM 40 MG: 40 TABLET, DELAYED RELEASE ORAL at 08:28

## 2023-11-20 RX ADMIN — AMLODIPINE BESYLATE 5 MG: 5 TABLET ORAL at 08:28

## 2023-11-20 RX ADMIN — DONEPEZIL HYDROCHLORIDE 10 MG: 10 TABLET, FILM COATED ORAL at 17:09

## 2023-11-20 NOTE — PLAN OF CARE
Goal Outcome Evaluation:  Plan of Care Reviewed With: patient        Progress: improving  Outcome Evaluation: Pt seen for PT this am. He is doing well w no complaints. Pt is progressing w activity. He was able to ambulate approx 80 ft in room w CGA/min A. He did not use AD this session. Pt does exhibit small steps and slight anterior lean w ambulation, but no overt LOB. Pt up in chair at end of session. Will continue to progress activity as tolerated.

## 2023-11-20 NOTE — CASE MANAGEMENT/SOCIAL WORK
Continued Stay Note  [facility]     Patient Name: Jake Kan  MRN: [id number]  Today's Date: 11/20/2023    Admit Date: 11/8/2023    Plan: home with friend and HH ( referrals pending)   Discharge Plan       Row Name 11/20/23 1504       Plan    Plan home with friend and HH ( referrals pending)    Patient/Family in Agreement with Plan yes    Plan Comments Spoke with friendKary, and reviewed PT notes. Explained that he will not be able to go to SNF is she isn't willing to assist with paperwork. She would like for patient to return home with her. She understands that he needs 24/7 care. She would like HH to follow. Rerferral placed in EPIC. Will follow up on acceptance tomorrow.                   Discharge Codes    No documentation.                 Expected Discharge Date and Time       Expected Discharge Date Expected Discharge Time    Nov 21, 2023               Ольга Gonzales RN

## 2023-11-20 NOTE — THERAPY TREATMENT NOTE
Patient Name: Jake Kan  : 1945    MRN: [id number]                              Today's Date: 2023       Admit Date: 2023    Visit Dx:     ICD-10-CM ICD-9-CM   1. Altered mental status, unspecified altered mental status type  R41.82 780.97   2. Non-traumatic rhabdomyolysis  M62.82 728.88   3. Troponin level elevated  R79.89 790.6   4. COVID-19 virus infection  U07.1 079.89     Patient Active Problem List   Diagnosis    Sciatica of right side    Gastroesophageal reflux disease without esophagitis    Abnormal EKG    Hyperlipidemia    Rosacea    Seborrheic dermatitis    Pneumonia due to COVID-19 virus    Metabolic encephalopathy    Stage 3a chronic kidney disease    Cytokine release syndrome, grade 2    Age-related cognitive decline    Acute encephalopathy    Non-traumatic rhabdomyolysis    Traumatic rhabdomyolysis    Dementia without behavioral disturbance     Past Medical History:   Diagnosis Date    Pneumonia due to COVID-19 virus 2021     Past Surgical History:   Procedure Laterality Date    HERNIA REPAIR      UMBILICAL HERNIA REPAIR N/A 2019    Procedure: excision of umbilical hernia mesh and umbilical hernia repair;  Surgeon: Luan Dowell MD;  Location: [facility] MAIN OR;  Service: General    WRIST FRACTURE SURGERY Left       General Information       Row Name 23 1026          Physical Therapy Time and Intention    Document Type therapy note (daily note)  -     Mode of Treatment physical therapy  -       Row Name 23 1026          General Information    Existing Precautions/Restrictions fall  -               User Key  (r) = Recorded By, (t) = Taken By, (c) = Cosigned By      Initials Name Provider Type    EJ Alesha Lentz, PT Physical Therapist                   Mobility       Row Name 23 1026          Bed Mobility    Supine-Sit [place] (Bed Mobility) standby assist;contact guard  -EJ     Sit-Supine [place] (Bed Mobility) not tested   -EJ     Assistive Device (Bed Mobility) bed rails;head of bed elevated  -       Row Name 11/20/23 1026          Sit-Stand Transfer    Sit-Stand [place] (Transfers) verbal cues;contact guard;minimum assist (75% patient effort)  -[facility] Name 11/20/23 1026          Gait/Stairs (Locomotion)    [place] Level (Gait) verbal cues;contact guard;minimum assist (75% patient effort)  -EJ     Distance in Feet (Gait) 80  -EJ     Deviations/Abnormal Patterns (Gait) elijah decreased;stride length decreased  -EJ     Bilateral Gait Deviations forward flexed posture;heel strike decreased;decreased arm swing  -EJ     Comment, (Gait/Stairs) small steps, slight anterior lean, did not use AD  -EJ               User Key  (r) = Recorded By, (t) = Taken By, (c) = Cosigned By      Initials Name Provider Type    Alesha Farrell, PT Physical Therapist                   Obj/Interventions    No documentation.                  Goals/Plan    No documentation.                  Clinical Impression       [facility] Name 11/20/23 1027          Pain    Pretreatment Pain Rating 0/10 - no pain  -[facility] Name 11/20/23 1027          Plan of Care Review    Plan of Care Reviewed With patient  -EJ     Progress improving  -     Outcome Evaluation Pt seen for PT this am. He is doing well w no complaints. Pt is progressing w activity. He was able to ambulate approx 80 ft in room w CGA/min A. He did not use AD this session. Pt does exhibit small steps and slight anterior lean w ambulation, but no overt LOB. Pt up in chair at end of session. Will continue to progress activity as tolerated.  -       Row Name 11/20/23 1027          Therapy Assessment/Plan (PT)    Therapy Frequency (PT) 3 times/wk  -[facility] Name 11/20/23 1027          Positioning and Restraints    Pre-Treatment Position in bed  -EJ     Post Treatment Position chair  -EJ     In Chair notified nsg;reclined;call light within reach;encouraged to call for assist;exit alarm  on  -EJ               User Key  (r) = Recorded By, (t) = Taken By, (c) = Cosigned By      Initials Name Provider Type    Alesha Farrell, PT Physical Therapist                   Outcome Measures       Row Name 11/20/23 1029 11/20/23 0400       How much help from another person do you currently need...    Turning from your back to your side while in flat bed without using bedrails? 4  -EJ 3  -CR    Moving from lying on back to sitting on the side of a flat bed without bedrails? 3  -EJ 3  -CR    Moving to and from a bed to a chair (including a wheelchair)? 3  -EJ 3  -CR    Standing up from a chair using your arms (e.g., wheelchair, bedside chair)? 3  -EJ 2  -CR    Climbing 3-5 steps with a railing? 3  -EJ 2  -CR    To walk in hospital room? 3  -EJ 2  -CR    AM-PAC 6 Clicks Score (PT) 19  -EJ 15  -CR    Highest Level of Mobility Goal 6 --> Walk 10 steps or more  -EJ 4 --> Transfer to chair/commode  -CR              User Key  (r) = Recorded By, (t) = Taken By, (c) = Cosigned By      Initials Name Provider Type    Alesha Farrell, PT Physical Therapist    Lisa Henderson RN Registered Nurse                                 Physical Therapy Education       Title: PT OT SLP Therapies (Done)       Topic: Physical Therapy (Done)       Point: Mobility training (Done)       Learning Progress Summary             Patient Acceptance, E,TB,D, VU,NR by  at 11/17/2023 0838    Acceptance, E,TB,D, NR by  at 11/15/2023 1529    Acceptance, E,TB, VU by  at 11/15/2023 1413    Acceptance, E,TB,D, VU,NR by  at 11/13/2023 1208    Acceptance, E,TB, VU by  at 11/12/2023 0333    Acceptance, E,TB, VU by  at 11/11/2023 0315    Acceptance, E, NR by  at 11/9/2023 0939   Other Acceptance, E,TB, VU by RW at 11/15/2023 1413                         Point: Home exercise program (Done)       Learning Progress Summary             Patient Acceptance, E,TB,D, VU,NR by  at 11/17/2023 0838    Acceptance, E,TB,D, NR by PH at  11/15/2023 1529    Acceptance, E,TB, VU by RW at 11/15/2023 1413    Acceptance, E,TB,D, VU,NR by  at 11/13/2023 1208    Acceptance, E,TB, VU by SS at 11/12/2023 0333    Acceptance, E,TB, VU by SS at 11/11/2023 0315    Acceptance, E, NR by  at 11/9/2023 0939   Other Acceptance, E,TB, VU by RW at 11/15/2023 1413                         Point: Body mechanics (Done)       Learning Progress Summary             Patient Acceptance, E,TB,D, VU,NR by  at 11/17/2023 0838    Acceptance, E,TB,D, NR by  at 11/15/2023 1529    Acceptance, E,TB, VU by RW at 11/15/2023 1413    Acceptance, E,TB,D, VU,NR by  at 11/13/2023 1208    Acceptance, E,TB, VU by SS at 11/12/2023 0333    Acceptance, E,TB, VU by SS at 11/11/2023 0315    Acceptance, E, NR by  at 11/9/2023 0939   Other Acceptance, E,TB, VU by  at 11/15/2023 1413                         Point: Precautions (Done)       Learning Progress Summary             Patient Acceptance, E,TB,D, VU,NR by  at 11/17/2023 0838    Acceptance, E,TB,D, NR by  at 11/15/2023 1529    Acceptance, E,TB, VU by  at 11/15/2023 1413    Acceptance, E,TB,D, VU,NR by  at 11/13/2023 1208    Acceptance, E,TB, VU by  at 11/12/2023 0333    Acceptance, E,TB, VU by  at 11/11/2023 0315    Acceptance, E, NR by  at 11/9/2023 0939   Other Acceptance, E,TB, VU by  at 11/15/2023 1413                                         User Key       Initials Effective Dates Name Provider Type Discipline     06/16/21 -  Susan Kan PT Physical Therapist PT     06/16/21 -  Du Moss RN Registered Nurse Nurse     06/16/21 -  Ivy Horowitz PTA Physical Therapist Assistant PT     02/03/22 -  Brady Arenas, RN Registered Nurse Nurse                  PT Recommendation and Plan     Plan of Care Reviewed With: patient  Progress: improving  Outcome Evaluation: Pt seen for PT this am. He is doing well w no complaints. Pt is progressing w activity. He was able to ambulate approx 80 ft in room w  CGA/min A. He did not use AD this session. Pt does exhibit small steps and slight anterior lean w ambulation, but no overt LOB. Pt up in chair at end of session. Will continue to progress activity as tolerated.     Time Calculation:         PT Charges       Row Name 11/20/23 1032             Time Calculation    Start Time 1005  -EJ      Stop Time 1022  -EJ      Time Calculation (min) 17 min  -EJ      PT Received On 11/20/23  -EJ      PT - Next Appointment 11/22/23  -EJ                User Key  (r) = Recorded By, (t) = Taken By, (c) = Cosigned By      Initials Name Provider Type     Alesha Lentz, PT Physical Therapist                  Therapy Charges for Today       Code Description Service Date Service Provider Modifiers Qty    85200572028 HC PT THER PROC EA 15 MIN 11/20/2023 Alesha Lentz, PT GP 1            PT G-Codes  Outcome Measure Options: AM-PAC 6 Clicks Basic Mobility (PT)  AM-PAC 6 Clicks Score (PT): 19  AM-PAC 6 Clicks Score (OT): 12       Alesha Lentz PT  11/20/2023

## 2023-11-20 NOTE — PLAN OF CARE
Goal Outcome Evaluation:  Plan of Care Reviewed With: patient        Progress: improving  Outcome Evaluation: Pt seen for OT in AM, pt agreeable and eager to get out of bed, A&Ox1. CGA/min A for household distances, mod A for LBD, and declined sinkside this date, however demo'd approp distance to BR/sinkside. Pt engaging in UE ther ex for continued strengthening, anticipate SNF at d/c, continue to follow.      Anticipated Discharge Disposition (OT): skilled nursing facility

## 2023-11-20 NOTE — PLAN OF CARE
Goal Outcome Evaluation:  Plan of Care Reviewed With: patient        Progress: improving  Outcome Evaluation: Patient COVID pos with pneumonia. Alert and oriented to self, illogical speech at times. RA, dry cough occassionally. Patient to discharge to SNF when medically ready.

## 2023-11-20 NOTE — THERAPY TREATMENT NOTE
Patient Name: Jake Kan  : 1945    MRN: [id number]                              Today's Date: 2023       Admit Date: 2023    Visit Dx:     ICD-10-CM ICD-9-CM   1. Altered mental status, unspecified altered mental status type  R41.82 780.97   2. Non-traumatic rhabdomyolysis  M62.82 728.88   3. Troponin level elevated  R79.89 790.6   4. COVID-19 virus infection  U07.1 079.89     Patient Active Problem List   Diagnosis    Sciatica of right side    Gastroesophageal reflux disease without esophagitis    Abnormal EKG    Hyperlipidemia    Rosacea    Seborrheic dermatitis    Pneumonia due to COVID-19 virus    Metabolic encephalopathy    Stage 3a chronic kidney disease    Cytokine release syndrome, grade 2    Age-related cognitive decline    Acute encephalopathy    Non-traumatic rhabdomyolysis    Traumatic rhabdomyolysis    Dementia without behavioral disturbance     Past Medical History:   Diagnosis Date    Pneumonia due to COVID-19 virus 2021     Past Surgical History:   Procedure Laterality Date    HERNIA REPAIR      UMBILICAL HERNIA REPAIR N/A 2019    Procedure: excision of umbilical hernia mesh and umbilical hernia repair;  Surgeon: Luan Dowell MD;  Location: [facility] MAIN OR;  Service: General    WRIST FRACTURE SURGERY Left       General Information       Row Name 23 1236          OT Time and Intention    Document Type therapy note (daily note)  -MW     Mode of Treatment occupational therapy;co-treatment  decreased act tolerance, COVID (+)  -MW       Row Name 23 1236          General Information    Patient Profile Reviewed yes  -MW     Existing Precautions/Restrictions fall  -MW       Row Name 23 1236          Cognition    Orientation Status (Cognition) oriented to;person  -MW       Row Name 23 1236          Safety Issues, Functional Mobility    Impairments Affecting Function (Mobility) balance;endurance/activity tolerance;strength;cognition  -MW      Cognitive Impairments, Mobility Safety/Performance safety precaution awareness;safety precaution follow-through  -               User Key  (r) = Recorded By, (t) = Taken By, (c) = Cosigned By      Initials Name Provider Type    MW Lidia Barba OT Occupational Therapist                     Mobility/ADL's       Row Name 11/20/23 1237          Bed Mobility    Bed Mobility supine-sit;sit-supine  -     Supine-Sit [place] (Bed Mobility) standby assist;contact guard  -     Sit-Supine [place] (Bed Mobility) not tested  -     Assistive Device (Bed Mobility) bed rails;head of bed elevated  -     Comment, (Bed Mobility) [facility] end of session  -       Row Name 11/20/23 1237          Transfers    Transfers toilet transfer;sit-stand transfer  -       Row Name 11/20/23 [facility]          Sit-Stand Transfer    Sit-Stand [place] (Transfers) verbal cues;contact guard;minimum assist (75% patient effort)  -     Comment, (Sit-Stand Transfer) no AD  -       Row Name 11/20/23 [facility]          Stand-Sit Transfer    Stand-Sit [place] (Transfers) contact guard  -       Row Name 11/20/23 [facility]          Toilet Transfer    Comment, (Toilet Transfer) pt declined however demo'd approp distance to BR commode  -       Row Name 11/20/23 [facility]          Functional Mobility    Functional Mobility- Ind. Level minimum assist (75% patient effort);contact guard assist  -     Functional Mobility- Comment hosuehold distances this date with no AD, mostly CGA/min A  -       Row Name 11/20/23 1237          Activities of Daily Living    BADL Assessment/Intervention lower body dressing  -       Row Name 11/20/23 [facility]          Lower Body Dressing Assessment/Training    [place] Level (Lower Body Dressing) don;socks;moderate assist (50% patient effort)  -       Row Name 11/20/23 1237          Grooming Assessment/Training    Comment, (Grooming) declined sinkside g/h routine/tasks  -               User Key  (r) =  Recorded By, (t) = Taken By, (c) = Cosigned By      Initials Name Provider Type    Lidia Bowers OT Occupational Therapist                   Obj/Interventions       Row Name 11/20/23 1238          Shoulder (Therapeutic Exercise)    Shoulder (Therapeutic Exercise) AROM (active range of motion)  -     Shoulder AROM (Therapeutic Exercise) bilateral;aBduction;aDduction;scapular protraction;scapular retraction;20 repititions;sitting  -       Row Name 11/20/23 1238          Elbow/Forearm (Therapeutic Exercise)    Elbow/Forearm (Therapeutic Exercise) AROM (active range of motion)  -     Elbow/Forearm AROM (Therapeutic Exercise) bilateral;flexion;extension;10 repetitions;sitting  -       Row Name 11/20/23 1238          Motor Skills    Therapeutic Exercise shoulder;elbow/forearm  -       Row Name 11/20/23 1238          Balance    Balance Assessment sitting static balance;sitting dynamic balance;sit to stand dynamic balance;standing static balance;standing dynamic balance  -     Static Sitting Balance standby assist  -     Dynamic Sitting Balance standby assist  -     Position, Sitting Balance sitting edge of bed  -     Sit to Stand Dynamic Balance contact guard;minimal assist  -     Static Standing Balance contact guard  -     Dynamic Standing Balance contact guard  -     Position/Device Used, Standing Balance unsupported  -               User Key  (r) = Recorded By, (t) = Taken By, (c) = Cosigned By      Initials Name Provider Type    Lidia Bowers OT Occupational Therapist                   Goals/Plan    No documentation.                  Clinical Impression       Row Name 11/20/23 1239          Pain Assessment    Pretreatment Pain Rating 0/10 - no pain  -     Posttreatment Pain Rating 0/10 - no pain  -       Row Name 11/20/23 1239          Plan of Care Review    Plan of Care Reviewed With patient  -     Progress improving  -     Outcome Evaluation Pt seen for OT in AM, pt  agreeable and eager to get out of bed, A&Ox1. CGA/min A for household distances, mod A for LBD, and declined sinkside this date, however demo'd approp distance to BR/sinkside. Pt engaging in UE ther ex for continued strengthening, anticipate SNF at d/c, continue to follow.  -       Row Name 11/20/23 1239          Therapy Assessment/Plan (OT)    Therapy Frequency (OT) 3 times/wk  -       Row Name 11/20/23 1239          Therapy Plan Review/Discharge Plan (OT)    Anticipated Discharge Disposition (OT) skilled nursing facility  -       Row Name 11/20/23 1239          Vital Signs    O2 Delivery Pre Treatment room air  -       Row Name 11/20/23 1239          Positioning and Restraints    Pre-Treatment Position in bed  -     Post Treatment Position chair  -MW     In Chair notified nsg;reclined;encouraged to call for assist;exit alarm on;call light within reach  -               User Key  (r) = Recorded By, (t) = Taken By, (c) = Cosigned By      Initials Name Provider Type    Lidia Bowers, OT Occupational Therapist                   Outcome Measures       Row Name 11/20/23 1240          How much help from another is currently needed...    Putting on and taking off regular lower body clothing? 2  -MW     Bathing (including washing, rinsing, and drying) 2  -MW     Toileting (which includes using toilet bed pan or urinal) 2  -MW     Putting on and taking off regular upper body clothing 3  -MW     Taking care of personal grooming (such as brushing teeth) 3  -MW     Eating meals 3  -MW     AM-PAC 6 Clicks Score (OT) 15  -MW       Row Name 11/20/23 1029 11/20/23 0828       How much help from another person do you currently need...    Turning from your back to your side while in flat bed without using bedrails? 4  -EJ 4  -ANTHONY    Moving from lying on back to sitting on the side of a flat bed without bedrails? 3  -EJ 3  -ANTHONY    Moving to and from a bed to a chair (including a wheelchair)? 3  -EJ 3  -ANTHONY    Standing  up from a chair using your arms (e.g., wheelchair, bedside chair)? 3  -EJ 3  -ANTHONY    Climbing 3-5 steps with a railing? 3  -EJ 3  -ANTHONY    To walk in hospital room? 3  -EJ 3  -ANTHONY    AM-PAC 6 Clicks Score (PT) 19  -EJ 19  -ANTHONY    Highest Level of Mobility Goal 6 --> Walk 10 steps or more  -EJ 6 --> Walk 10 steps or more  -ANTHONY      Row Name 11/20/23 0400          How much help from another person do you currently need...    Turning from your back to your side while in flat bed without using bedrails? 3  -CR     Moving from lying on back to sitting on the side of a flat bed without bedrails? 3  -CR     Moving to and from a bed to a chair (including a wheelchair)? 3  -CR     Standing up from a chair using your arms (e.g., wheelchair, bedside chair)? 2  -CR     Climbing 3-5 steps with a railing? 2  -CR     To walk in hospital room? 2  -CR     AM-PAC 6 Clicks Score (PT) 15  -CR     Highest Level of Mobility Goal 4 --> Transfer to chair/commode  -CR       Row Name 11/20/23 1240          Functional Assessment    Outcome Measure Options AM-PAC 6 Clicks Daily Activity (OT)  -               User Key  (r) = Recorded By, (t) = Taken By, (c) = Cosigned By      Initials Name Provider Type    Alesha Farrell, PT Physical Therapist    Lisa Henderson RN Registered Nurse     Lidia Barba, OT Occupational Therapist    Steven Traylor, RN Registered Nurse                    Occupational Therapy Education       Title: PT OT SLP Therapies (Done)       Topic: Occupational Therapy (Done)       Point: ADL training (Done)       Description:   Instruct learner(s) on proper safety adaptation and remediation techniques during self care or transfers.   Instruct in proper use of assistive devices.                  Learning Progress Summary             Patient Acceptance, E,TB, VU by RW at 11/15/2023 1413    Acceptance, E,TB, VU by SS at 11/12/2023 0333    Acceptance, E,TB, VU by SS at 11/11/2023 0315    Acceptance, E, VU,DU by   at 11/9/2023 1042   Other Acceptance, E,TB, VU by  at 11/15/2023 1413                         Point: Home exercise program (Done)       Description:   Instruct learner(s) on appropriate technique for monitoring, assisting and/or progressing therapeutic exercises/activities.                  Learning Progress Summary             Patient Acceptance, E,TB, VU by RW at 11/15/2023 1413    Acceptance, E,TB, VU by  at 11/12/2023 0333    Acceptance, E,TB, VU by  at 11/11/2023 0315    Acceptance, E, VU,DU by  at 11/9/2023 1042   Other Acceptance, E,TB, VU by  at 11/15/2023 1413                         Point: Precautions (Done)       Description:   Instruct learner(s) on prescribed precautions during self-care and functional transfers.                  Learning Progress Summary             Patient Acceptance, E,TB, VU by  at 11/15/2023 1413    Acceptance, E,TB, VU by  at 11/12/2023 0333    Acceptance, E,TB, VU by  at 11/11/2023 0315    Acceptance, E, VU,DU by  at 11/9/2023 1042   Other Acceptance, E,TB, VU by  at 11/15/2023 1413                                         User Key       Initials Effective Dates Name Provider Type Discipline     06/16/21 -  Du Moss, RN Registered Nurse Nurse     02/03/22 -  Brady Arenas, RN Registered Nurse Nurse     09/22/22 -  Michelle Chairez OT Occupational Therapist OT                  OT Recommendation and Plan  Therapy Frequency (OT): 3 times/wk  Plan of Care Review  Plan of Care Reviewed With: patient  Progress: improving  Outcome Evaluation: Pt seen for OT in AM, pt agreeable and eager to get out of bed, A&Ox1. CGA/min A for household distances, mod A for LBD, and declined sinkside this date, however demo'd approp distance to BR/sinkside. Pt engaging in UE ther ex for continued strengthening, anticipate SNF at d/c, continue to follow.     Time Calculation:         Time Calculation- OT       Row Name 11/20/23 1240             Time Calculation- OT    OT Start  Time 1007  -MW      OT Stop Time 1022  -MW      OT Time Calculation (min) 15 min  -MW      Total Timed Code Minutes- OT 15 minute(s)  -MW      OT Received On 11/20/23  -MW      OT - Next Appointment 11/22/23  -MW         Timed Charges    45833 - OT Therapeutic Activity Minutes 15  -MW         Total Minutes    Timed Charges Total Minutes 15  -MW       Total Minutes 15  -MW                User Key  (r) = Recorded By, (t) = Taken By, (c) = Cosigned By      Initials Name Provider Type     Lidia Barba OT Occupational Therapist                  Therapy Charges for Today       Code Description Service Date Service Provider Modifiers Qty    [id number]  OT THERAPEUTIC ACT EA 15 MIN 11/20/2023 Lidia Barba OT GO 1                 Lidia Barba OT  11/20/2023

## 2023-11-20 NOTE — PROGRESS NOTES
Name: Jake Kan ADMIT: 2023   : 1945  PCP: Sree Thomas MD    MRN: [id number] LOS: 12 days   AGE/SEX: 78 y.o. male  ROOM: [facility]     Subjective   Subjective   Up in chair, after working with therapy. No specific complaints except wanting to go home. Asking why he can't go home at discharge and receive home health. Says his GF can help him at home but doesn't sound like she is available        Objective   Objective   Vital Signs  Temp:  [96.3 °F (35.7 °C)-97.5 °F (36.4 °C)] 97 °F (36.1 °C)  Heart Rate:  [57-70] 67  Resp:  [16-17] 17  BP: (112-158)/(60-72) 120/66  SpO2:  [95 %-98 %] 95 %  on   ;   Device (Oxygen Therapy): room air  Body mass index is 22.81 kg/m².  Physical Exam  Vitals and nursing note reviewed.   Constitutional:       General: He is not in acute distress.     Appearance: He is ill-appearing. He is not toxic-appearing.      Comments: Chronically ill appearance   HENT:      Head: Normocephalic.      Mouth/Throat:      Mouth: Mucous membranes are moist.   Eyes:      Conjunctiva/sclera: Conjunctivae normal.   Cardiovascular:      Rate and Rhythm: Normal rate and regular rhythm.   Pulmonary:      Effort: Pulmonary effort is normal. No respiratory distress.      Breath sounds: No wheezing or rales.   Abdominal:      General: Bowel sounds are normal.      Palpations: Abdomen is soft.   Musculoskeletal:      Cervical back: Neck supple.      Right lower leg: No edema.      Left lower leg: No edema.   Skin:     General: Skin is warm and dry.   Neurological:      Mental Status: He is alert.      Comments: Oriented to self, place   Psychiatric:         Mood and Affect: Mood normal.         Behavior: Behavior normal.       Results Review     I reviewed the patient's new clinical results.  Results from last 7 days   Lab Units 23  0443 23  0552 23  0603 11/15/23  0541   WBC 10*3/mm3 7.12 5.29 6.41 6.78   HEMOGLOBIN g/dL 13.6 12.7* 12.8* 13.2   PLATELETS 10*3/mm3 345  "339 335 309     Results from last 7 days   Lab Units 11/20/23  0959 11/19/23  1438 11/19/23  0544 11/18/23  0552 11/16/23  0603   SODIUM mmol/L 140  --  143 143 140   POTASSIUM mmol/L 3.7 3.9 3.3* 3.4* 3.3*   CHLORIDE mmol/L 107  --  108* 109* 106   CO2 mmol/L 26.4  --  26.9 25.4 26.3   BUN mg/dL 21  --  23 25* 23   CREATININE mg/dL 1.10  --  1.03 0.95 0.98   GLUCOSE mg/dL 105*  --  109* 95 107*   EGFR mL/min/1.73 67.9  --  73.4 80.9 78.0     Results from last 7 days   Lab Units 11/20/23  0959 11/18/23  0552 11/16/23  0603 11/15/23  0541   ALBUMIN g/dL 3.3* 3.3* 3.3* 3.5   BILIRUBIN mg/dL 0.4 0.3 0.4 0.4   ALK PHOS U/L 67 62 63 70   AST (SGOT) U/L 23 27 16 19   ALT (SGPT) U/L 24 24 16 16     Results from last 7 days   Lab Units 11/20/23  0959 11/19/23  0544 11/18/23  0552 11/16/23  0603 11/15/23  0541   CALCIUM mg/dL 9.2 9.0 9.1 9.1 9.2   ALBUMIN g/dL 3.3*  --  3.3* 3.3* 3.5     Results from last 7 days   Lab Units 11/14/23  0652   PROCALCITONIN ng/mL 0.05     No results found for: \"HGBA1C\", \"POCGLU\"    No radiology results for the last day    I have personally reviewed all medications:  Scheduled Medications  amLODIPine, 5 mg, Oral, Q24H  donepezil, 10 mg, Oral, Daily With Dinner  enoxaparin, 40 mg, Subcutaneous, Q24H  hydrocortisone-bacitracin-zinc oxide-nystatin, 1 application , Topical, BID  pantoprazole, 40 mg, Oral, Q AM  senna-docusate sodium, 2 tablet, Oral, BID    Infusions   Diet  Diet: Regular/House Diet; Texture: Regular Texture (IDDSI 7); Fluid Consistency: Thin (IDDSI 0)    I have personally reviewed:  [x]  Laboratory   [x]  Microbiology   [x]  Radiology   [x]  EKG/Telemetry  [x]  Cardiology/Vascular   []  Pathology    []  Records       Assessment/Plan     Active Hospital Problems    Diagnosis  POA    **Pneumonia due to COVID-19 virus [U07.1, J12.82]  Yes    Traumatic rhabdomyolysis [T79.6XXA]  Unknown    Dementia without behavioral disturbance [F03.90]  Unknown    Acute encephalopathy [G93.40]  Yes    " Age-related cognitive decline [R41.81]  Yes    Stage 3a chronic kidney disease [N18.31]  Yes    Gastroesophageal reflux disease without esophagitis [K21.9]  Yes    Hyperlipidemia [E78.5]  Yes      Resolved Hospital Problems   No resolved problems to display.       78 y.o. male admitted with Pneumonia due to COVID-19 virus.    Dementia without behavioral disturbance, advanced  Metabolic Encephalopathy-related to COVID, resolved  -Patient is A&O x1-2 at baseline,  - Continue to monitor. Delirium/aspiration/fall precautions.     Traumatic Rhabdomyolysis- resolved with IVF  - Etiology secondary to prolonged time on his floor, no evidence of compartment syndrome or pigment nephropathy     COVID-19 Pneumonia  - Diagnosis noted on arrival. No evidence of bacterial pneumonia, patient not hypoxic so treatment with steroids not indicated. Completed Paxlovid     High blood pressure/hypertension  -No previous diagnosis per patient, initiate amlodipine 5 mg     Chronic kidney disease stage 3A  -Creatinine has been stable, continue to trend on daily BMP     Anemia  - Hemoglobin stable on most recent labs. No evidence of overt blood loss. No indications for acute intervention at this time.    - Continue to monitor, transfuse for hemoglobin <7.     Debility/impairment in physical status  - PT/OT recommending SNF, CCP to see    MRI brain pending; further evaluation of scalp hematoma & scalp nodule noted on CTH on 11/8/23         Lovenox 40 mg SC daily for DVT prophylaxis.  Full code.  Discussed with patient.  Anticipate discharge to SNU facility once arrangements have been made.      SAM Redd  [facility]  11/20/23  13:26 EST

## 2023-11-20 NOTE — PLAN OF CARE
Goal Outcome Evaluation:   Patient is COVID positive, PPE utilized and precautions maintained. Alert only to self. Patient has been able to ambulate to bathroom assist x1. Dry, non-productive cough present. MRI scheduled for today. VSS on RA. Will continue to monitor.

## 2023-11-21 ENCOUNTER — READMISSION MANAGEMENT (OUTPATIENT)
Dept: CALL CENTER | Facility: HOSPITAL | Age: 78
End: 2023-11-21
Payer: MEDICARE

## 2023-11-21 ENCOUNTER — TELEPHONE (OUTPATIENT)
Dept: FAMILY MEDICINE CLINIC | Facility: CLINIC | Age: 78
End: 2023-11-21

## 2023-11-21 VITALS
BODY MASS INDEX: 22.9 KG/M2 | WEIGHT: 163.58 LBS | OXYGEN SATURATION: 97 % | TEMPERATURE: 97.8 F | DIASTOLIC BLOOD PRESSURE: 65 MMHG | HEART RATE: 67 BPM | HEIGHT: 71 IN | SYSTOLIC BLOOD PRESSURE: 110 MMHG | RESPIRATION RATE: 17 BRPM

## 2023-11-21 RX ORDER — LANOLIN ALCOHOL/MO/W.PET/CERES
3 CREAM (GRAM) TOPICAL NIGHTLY
Qty: 30 TABLET | Refills: 0 | Status: SHIPPED | OUTPATIENT
Start: 2023-11-21

## 2023-11-21 RX ORDER — DONEPEZIL HYDROCHLORIDE 10 MG/1
10 TABLET, FILM COATED ORAL
Qty: 90 TABLET | Refills: 0 | Status: SHIPPED | OUTPATIENT
Start: 2023-11-21

## 2023-11-21 RX ORDER — AMLODIPINE BESYLATE 5 MG/1
5 TABLET ORAL
Qty: 30 TABLET | Refills: 0 | Status: SHIPPED | OUTPATIENT
Start: 2023-11-21 | End: 2023-11-21 | Stop reason: SDUPTHER

## 2023-11-21 RX ORDER — MECOBALAMIN 5000 MCG
15 TABLET,DISINTEGRATING ORAL DAILY
Qty: 90 CAPSULE | Refills: 0 | Status: SHIPPED | OUTPATIENT
Start: 2023-11-21

## 2023-11-21 RX ORDER — AMLODIPINE BESYLATE 5 MG/1
5 TABLET ORAL
Qty: 90 TABLET | Refills: 0 | Status: SHIPPED | OUTPATIENT
Start: 2023-11-21

## 2023-11-21 RX ORDER — LANOLIN ALCOHOL/MO/W.PET/CERES
3 CREAM (GRAM) TOPICAL NIGHTLY
Qty: 30 TABLET | Refills: 0
Start: 2023-11-21 | End: 2023-11-21 | Stop reason: SDUPTHER

## 2023-11-21 RX ADMIN — AMLODIPINE BESYLATE 5 MG: 5 TABLET ORAL at 08:05

## 2023-11-21 RX ADMIN — PANTOPRAZOLE SODIUM 40 MG: 40 TABLET, DELAYED RELEASE ORAL at 08:05

## 2023-11-21 NOTE — TELEPHONE ENCOUNTER
Caller: REMA    Relationship to patient: Other    Patient is needing: YESENIA WOULD LIKE A CALLBACK WITH A VERBAL ORDER FOR SKILLED NURSING, PT AND OT FOR THE PATIENT.  SHE STATES PATIENT HAS BEEN IN THE HOSPITAL FOR MECHANICAL FALLS AND PNEUMONIA RELATED TO COVID.    PATIENT IS DISCHARGING TODAY.  PLEASE CALL YESENIA  [phone]

## 2023-11-21 NOTE — DISCHARGE SUMMARY
Patient Name: Jake Kan  : 1945  MRN: [id number]    Date of Admission: 2023  Date of Discharge:  2023  Primary Care Physician: Sree Thomas MD      Chief Complaint:   Weakness - Generalized      Discharge Diagnoses     Active Hospital Problems    Diagnosis  POA    **Pneumonia due to COVID-19 virus [U07.1, J12.82]  Yes    Traumatic rhabdomyolysis [T79.6XXA]  Yes    Dementia without behavioral disturbance [F03.90]  Yes    Acute encephalopathy [G93.40]  Yes    Age-related cognitive decline [R41.81]  Yes    Stage 3a chronic kidney disease [N18.31]  Yes    Gastroesophageal reflux disease without esophagitis [K21.9]  Yes    Hyperlipidemia [E78.5]  Yes      Resolved Hospital Problems   No resolved problems to display.        Hospital Course     Mr. Kan is a 78 y.o. male with a history of HLD, GERD, CKD 3a, dementia who presented to [facility] initially complaining of weakness, confusion.  Please see the admitting history and physical for further details.  He was found to have metabolic encephalopathy due to non traumatic rhabdomyolysis and covid 19 pneumonia and was admitted to the hospital for further evaluation and treatment.  No evidence of compartment syndrome. Rhabdo was treated w/IVF's. Renal function remained stable. There was no evidence of bacterial pneumonia, antibiotics were not indicated. No hypoxia therefore steroids were avoided. He completed treatment with paxlovid. He has returned to baseline cognitively. OT/PT have followed. On admission, CTH was completed that showed scalp hematoma and scalp nodule, recommending MRI. This was unable to be completed due to pt refusal to have IV placed. He will need follow up CT outpatient to re-evaluate. Stable from medical standpoint for discharge home w/significant other and home health services.  Day of Discharge     Subjective:  Up in chair when seen. Denies pain, soa, cough. Eating well. On room air. Afebrile.  Problem: Falls - Risk of  Goal: *Absence of Falls  Description: Document Chelly Ruiz Fall Risk and appropriate interventions in the flowsheet. Outcome: Progressing Towards Goal  Note: Fall Risk Interventions:  Mobility Interventions: Bed/chair exit alarm    Mentation Interventions: Bed/chair exit alarm    Medication Interventions: Bed/chair exit alarm    Elimination Interventions: Bed/chair exit alarm    History of Falls Interventions: Bed/chair exit alarm         Problem: Pressure Injury - Risk of  Goal: *Prevention of pressure injury  Description: Document Dejuan Scale and appropriate interventions in the flowsheet.   Outcome: Progressing Towards Goal  Note: Pressure Injury Interventions:  Sensory Interventions: Minimize linen layers    Moisture Interventions: Minimize layers    Activity Interventions: Assess need for specialty bed    Mobility Interventions: HOB 30 degrees or less    Nutrition Interventions: Document food/fluid/supplement intake    Friction and Shear Interventions: Minimize layers, Apply protective barrier, creams and emollients "    Physical Exam:  Temp:  [97.1 °F (36.2 °C)-97.8 °F (36.6 °C)] 97.8 °F (36.6 °C)  Heart Rate:  [64-67] 67  Resp:  [16-18] 17  BP: (110-125)/(65-73) 110/65  Body mass index is 22.81 kg/m².  Physical Exam  Vitals and nursing note reviewed.   Constitutional:       General: He is not in acute distress.     Appearance: He is ill-appearing. He is not toxic-appearing.      Comments: Frail, elderly   HENT:      Head: Normocephalic.      Mouth/Throat:      Mouth: Mucous membranes are moist.   Eyes:      Conjunctiva/sclera: Conjunctivae normal.   Cardiovascular:      Rate and Rhythm: Normal rate and regular rhythm.   Pulmonary:      Effort: Pulmonary effort is normal. No respiratory distress.      Breath sounds: No wheezing or rales.   Abdominal:      General: Bowel sounds are normal.      Palpations: Abdomen is soft.   Musculoskeletal:      Cervical back: Neck supple.      Right lower leg: No edema.      Left lower leg: No edema.   Skin:     General: Skin is warm and dry.   Neurological:      Mental Status: He is alert. Mental status is at baseline.      Comments: Confused to month; oriented to self, \"hospital\"   Psychiatric:         Mood and Affect: Mood normal.         Behavior: Behavior normal.         Consultants     Consult Orders (all) (From admission, onward)       Start     Ordered    11/17/23 0903  Inpatient Case Management  Consult  Once        Provider:  (Not yet assigned)    11/17/23 0902    11/08/23 1037  [facility] (on-call MD unless specified) Details  Once        Specialty:  Hospitalist  Provider:  (Not yet assigned)    11/08/23 1036                  Procedures     * Surgery not found *    Imaging Results (All)       Procedure Component Value Units Date/Time    CT Head Without Contrast [[id number]] Collected: 11/08/23 0922     Updated: 11/08/23 1658    Narrative:      CT HEAD WITHOUT CONTRAST     HISTORY: Dizziness, found on floor.     COMPARISON: CT head 08/24/2022.     FINDINGS: There is no " evidence of fracture or of intracranial  hemorrhage. A scalp hematoma is noted in the frontal region anteriorly.  The brain and ventricles are symmetrical. There is no evidence of acute  infarction, hydrocephalus or midline shift. A more nodular area of soft  tissue is appreciated overlying the frontal bone to the left of midline  superiorly measuring 6 mm in size. This is new versus the CT examination  of 08/24/2022.       Impression:      A scalp hematoma is noted in the frontal region. There is no  evidence of fracture or of intracranial hemorrhage. Further evaluation  could be performed with a MRI examination of the brain as indicated.  There is a 6 mm nodule involving the scalp overlying the frontal bone to  the left superiorly which is new versus the prior examination. This may  be sequela from trauma or potentially a new nodule/sebaceous cyst. A  follow-up CT examination of the head is recommended. If this nodule  persists, it can be further characterized with a MRI examination of the  brain with and without contrast.     Radiation dose reduction techniques were utilized, including automated  exposure control and exposure modulation based on body size.        This report was finalized on 11/8/2023 4:55 PM by Dr. Benjamin Jacome M.D  on Workstation: [id number]       XR Chest 1 View [[id number]] Collected: 11/08/23 0849     Updated: 11/08/23 0855    Narrative:      XR CHEST 1 VW-11/8/2023     HISTORY: Weakness. Patient down with decreased responsiveness.     Heart size is within normal limits. The lungs appear free of acute  infiltrates. There is deformity of the left mid clavicle from an old  left mid clavicle fracture. Bones and soft tissues are otherwise  unremarkable.     There is slight blunting of the left costophrenic sulcus.       Impression:      1. Slight blunting of the left costophrenic sulcus which may be related  to a small left pleural effusion.  2. No other significant findings are noted.       "  This report was finalized on 11/8/2023 8:52 AM by Dr. Justin Hirsch M.D on Workstation: [id number]             Results for orders placed during the hospital encounter of 09/02/21    Duplex Venous Lower Extremity - Bilateral CAR    Interpretation Summary  · Normal bilateral lower extremity venous duplex scan.      Pertinent Labs     Results from last 7 days   Lab Units 11/20/23  0443 11/18/23  0552 11/16/23  0603 11/15/23  0541   WBC 10*3/mm3 7.12 5.29 6.41 6.78   HEMOGLOBIN g/dL 13.6 12.7* 12.8* 13.2   PLATELETS 10*3/mm3 345 339 335 309     Results from last 7 days   Lab Units 11/20/23  0959 11/19/23  1438 11/19/23  0544 11/18/23  0552 11/16/23  0603   SODIUM mmol/L 140  --  143 143 140   POTASSIUM mmol/L 3.7 3.9 3.3* 3.4* 3.3*   CHLORIDE mmol/L 107  --  108* 109* 106   CO2 mmol/L 26.4  --  26.9 25.4 26.3   BUN mg/dL 21  --  23 25* 23   CREATININE mg/dL 1.10  --  1.03 0.95 0.98   GLUCOSE mg/dL 105*  --  109* 95 107*   EGFR mL/min/1.73 67.9  --  73.4 80.9 78.0     Results from last 7 days   Lab Units 11/20/23  0959 11/18/23  0552 11/16/23  0603 11/15/23  0541   ALBUMIN g/dL 3.3* 3.3* 3.3* 3.5   BILIRUBIN mg/dL 0.4 0.3 0.4 0.4   ALK PHOS U/L 67 62 63 70   AST (SGOT) U/L 23 27 16 19   ALT (SGPT) U/L 24 24 16 16     Results from last 7 days   Lab Units 11/20/23  0959 11/19/23  0544 11/18/23  0552 11/16/23  0603 11/15/23  0541   CALCIUM mg/dL 9.2 9.0 9.1 9.1 9.2   ALBUMIN g/dL 3.3*  --  3.3* 3.3* 3.5               Invalid input(s): \"LDLCALC\"          Test Results Pending at Discharge       Discharge Details        Discharge Medications        New Medications        Instructions Start Date   amLODIPine 5 MG tablet  Commonly known as: NORVASC   5 mg, Oral, Every 24 Hours Scheduled      melatonin 3 MG tablet   3 mg, Oral, Nightly             Continue These Medications        Instructions Start Date   donepezil 10 MG tablet  Commonly known as: Aricept   10 mg, Oral, Daily With Dinner      lansoprazole 15 MG " capsule  Commonly known as: PREVACID   15 mg, Oral, Daily               No Known Allergies    Discharge Disposition:  Home-Health Care [facility] [place] [facility]      Discharge Diet:  Diet Order   Procedures    Diet: Regular/House Diet; Texture: Regular Texture (IDDSI 7); Fluid Consistency: Thin (IDDSI 0)       Discharge Activity:   Activity Instructions       Activity as Tolerated      Activity as Tolerated      Up WIth Assist              CODE STATUS:    Code Status and Medical Interventions:   Ordered at: 11/08/23 1332     Code Status (Patient has no pulse and is not breathing):    CPR (Attempt to Resuscitate)     Medical Interventions (Patient has pulse or is breathing):    Full Support       No future appointments.  Additional Instructions for the Follow-ups that You Need to Schedule       Ambulatory Referral to Home Health (Hospital)   As directed      Face to Face Visit Date: 11/21/2023   Follow-up provider for Plan of Care?: I treated the patient in an acute care facility and will not continue treatment after discharge.   Follow-up provider: JC JENSEN [[id number]]   Reason/Clinical Findings: Covid pneumonia   Describe mobility limitations that make leaving home difficult: Decreased strength, endurance mobiltiy   Nursing/Therapeutic Services Requested: Skilled Nursing Physical Therapy   Skilled nursing orders: Cardiopulmonary assessments (BP monitoring) Medication education   PT orders: Therapeutic exercise Strengthening Home safety assessment   Frequency: 1 Week 1        Discharge Follow-up with PCP   As directed       Currently Documented PCP:    Jc Jensen MD    PCP Phone Number:    [phone]     Follow Up Details: post-hospital follow up               Contact information for follow-up providers       Jc Jensen MD. Schedule an appointment as soon as possible for a visit in 1 week(s).    Specialty: Family Medicine  Why: post-hospital follow up  Contact information:  [address]  [address]  [phone]                       Contact information for after-discharge care       Home Medical Care       [facility] [place] .    Service: Home Health Services  Contact information:  [id number]  [facility] [address] [phone]                                   Additional Instructions for the Follow-ups that You Need to Schedule       Ambulatory Referral to Home Health (Hospital)   As directed      Face to Face Visit Date: 11/21/2023   Follow-up provider for Plan of Care?: I treated the patient in an acute care facility and will not continue treatment after discharge.   Follow-up provider: SREE JENSEN [[id number]]   Reason/Clinical Findings: Covid pneumonia   Describe mobility limitations that make leaving home difficult: Decreased strength, endurance mobiltiy   Nursing/Therapeutic Services Requested: Skilled Nursing Physical Therapy   Skilled nursing orders: Cardiopulmonary assessments (BP monitoring) Medication education   PT orders: Therapeutic exercise Strengthening Home safety assessment   Frequency: 1 Week 1        Discharge Follow-up with PCP   As directed       Currently Documented PCP:    Sree Jensen MD    PCP Phone Number:    [phone]     Follow Up Details: post-hospital follow up            Time Spent on Discharge:  Greater than 30 minutes      SAM Redd  [facility]  11/21/23  15:03 EST               Detail Level: Detailed Size Of Lesion: 0.7 x 0.4 cm Morphology Per Location (Optional): Light brown papule with inferior linear hyperpigmention Morphology Per Location (Optional): Speckled slightly asymmetrical patch with light brown, brown variation Size Of Lesion: 1.2 x 0.5 cm

## 2023-11-21 NOTE — PLAN OF CARE
Goal Outcome Evaluation:              Outcome Evaluation: Patient's only complaint this morning is a dry cough, doing well on room air. He is sitting up in bed eating breakfast. Discharge today.

## 2023-11-21 NOTE — CASE MANAGEMENT/SOCIAL WORK
Case Management Discharge Note      Final Note: dc home with [facility]         Selected Continued Care - Discharged on 11/21/2023 Admission date: 11/8/2023 - Discharge disposition: Home-Health Care Svc      Destination    No services have been selected for the patient.                Durable Medical Equipment    No services have been selected for the patient.                Dialysis/Infusion    No services have been selected for the patient.                Home Medical Care Coordination complete.      Service Provider Selected Services Address Phone Fax Patient Preferred    [facility] [id number]  [place] [address] [phone] --              Therapy    No services have been selected for the patient.                Community Resources    No services have been selected for the patient.                Community & DME    No services have been selected for the patient.                         Final Discharge Disposition Code: 06 - home with home health care

## 2023-11-21 NOTE — PLAN OF CARE
Goal Outcome Evaluation:  Plan of Care Reviewed With: patient        Progress: no change  Outcome Evaluation: Patient with COVID pneumonia. Alert to self only. MRI was ordered and IV needed. MRI unsuccessful to place IV after several attempts. Upon arrival to floor, IV therapy attempted IV placement and patient refused. Discharge plans pending PT eval.

## 2023-11-21 NOTE — CASE MANAGEMENT/SOCIAL WORK
Continued Stay Note  [facility]     Patient Name: Jake Kan  MRN: [id number]  Today's Date: 11/21/2023    Admit Date: 11/8/2023    Plan: home with friend and [facility]   Discharge Plan       Row Name 11/21/23 6750       Plan    [facility]    Patient/Family in Agreement with Plan yes    Plan Comments Spoke with friend, Kary, and she will  patient later today[facility] [place]                   Discharge Codes    No documentation.                 Expected Discharge Date and Time       Expected Discharge Date Expected Discharge Time    Nov 21, 2023               Ольга Gonzales RN

## 2023-11-22 ENCOUNTER — TRANSITIONAL CARE MANAGEMENT TELEPHONE ENCOUNTER (OUTPATIENT)
Dept: CALL CENTER | Facility: HOSPITAL | Age: 78
End: 2023-11-22
Payer: MEDICARE

## 2023-11-22 NOTE — OUTREACH NOTE
Call Center TCM Note      Flowsheet Row Responses   [facility] patient discharged from? [place]   Does the patient have one of the following disease processes/diagnoses(primary or secondary)? Pneumonia   TCM attempt successful? No   Unsuccessful attempts Attempt 1  [No current PCP [facility] on file]            Giulia Barboza RN    11/22/2023, 13:18 EST

## 2023-11-22 NOTE — OUTREACH NOTE
Call Center TCM Note      Flowsheet Row Responses   [facility] patient discharged from? [place]   Does the patient have one of the following disease processes/diagnoses(primary or secondary)? Pneumonia   TCM attempt successful? No   Unsuccessful attempts Attempt 2            Giulia Barboza RN    11/22/2023, 15:56 EST

## 2023-11-22 NOTE — OUTREACH NOTE
Prep Survey      Flowsheet Row Responses   [facility] patient discharged from? [place]   Is LACE score < 7 ? No   Eligibility [facility]   Date of Admission 11/08/23   Date of Discharge 11/21/23   Discharge Disposition Home-Health Care Sv   Discharge diagnosis Pneumonia due to COVID-19 virus (   Does the patient have one of the following disease processes/diagnoses(primary or secondary)? Pneumonia   Does the patient have Home health ordered? Yes   What is the Home health agency?  [facility]    Is there a DME ordered? No   Prep survey completed? Yes            BANDAR MROEL - Registered Nurse

## 2023-11-24 ENCOUNTER — TRANSITIONAL CARE MANAGEMENT TELEPHONE ENCOUNTER (OUTPATIENT)
Dept: CALL CENTER | Facility: HOSPITAL | Age: 78
End: 2023-11-24
Payer: MEDICARE

## 2023-11-24 NOTE — OUTREACH NOTE
Call Center TCM Note      Flowsheet Row Responses   [facility] patient discharged from? [place]   Does the patient have one of the following disease processes/diagnoses(primary or secondary)? Pneumonia   TCM attempt successful? No   Unsuccessful attempts Attempt 3            Tami Rice RN    11/24/2023, 14:30 EST

## 2024-02-21 ENCOUNTER — OFFICE VISIT (OUTPATIENT)
Dept: FAMILY MEDICINE CLINIC | Facility: CLINIC | Age: 79
End: 2024-02-21
Payer: MEDICARE

## 2024-02-21 VITALS
HEIGHT: 71 IN | TEMPERATURE: 97.5 F | OXYGEN SATURATION: 100 % | WEIGHT: 175 LBS | SYSTOLIC BLOOD PRESSURE: 124 MMHG | BODY MASS INDEX: 24.5 KG/M2 | HEART RATE: 67 BPM | DIASTOLIC BLOOD PRESSURE: 80 MMHG

## 2024-02-21 DIAGNOSIS — R41.89 COGNITIVE DECLINE: ICD-10-CM

## 2024-02-21 DIAGNOSIS — G30.0 MODERATE EARLY ONSET ALZHEIMER'S DEMENTIA WITH MOOD DISTURBANCE: ICD-10-CM

## 2024-02-21 DIAGNOSIS — F02.B3 MODERATE EARLY ONSET ALZHEIMER'S DEMENTIA WITH MOOD DISTURBANCE: ICD-10-CM

## 2024-02-21 DIAGNOSIS — R26.81 GAIT INSTABILITY: Primary | ICD-10-CM

## 2024-02-21 PROBLEM — T79.6XXA TRAUMATIC RHABDOMYOLYSIS: Status: RESOLVED | Noted: 2023-11-14 | Resolved: 2024-02-21

## 2024-02-21 PROCEDURE — 1160F RVW MEDS BY RX/DR IN RCRD: CPT | Performed by: FAMILY MEDICINE

## 2024-02-21 PROCEDURE — 99214 OFFICE O/P EST MOD 30 MIN: CPT | Performed by: FAMILY MEDICINE

## 2024-02-21 PROCEDURE — 1159F MED LIST DOCD IN RCRD: CPT | Performed by: FAMILY MEDICINE

## 2024-02-21 NOTE — PROGRESS NOTES
"  Subjective   Jake Kan is a 78 y.o. male who is here for   Chief Complaint   Patient presents with    Fall     More recently while out . Does have back pain at times. Shuffling feet while walking, no injuries after falls.    .     History of Present Illness     Mr. Kan brought in by his longtime girlfriend.  He was in the hospital back in November with COVID-19 infection with pneumonia also delirium.  This is on top of baseline dementia.  Girlfriend reports his dementia is getting worse.  Memory is worse.  He sleeps most of the time.  He falls frequently.  His gait is unsteady.  Asking for a motorized scooter so he can get around outside the home    We will fill out his handicap parking tag today    He refuses to take any kind of medications    She is willing to take him to a neurologist for further guidance    Looks like he is in slow decline, I will consult palliative care        Went cruise a few months ago, when while in [place] he fell down the ramp up to the boat.  Received medical care had to cut their trip short and flew home early.  I will dictate a letter so she may activate her trip cancellation insurance    The following portions of the patient's history were reviewed and updated as appropriate: allergies, current medications, past family history, past medical history, past social history, past surgical history, and problem list.    Review of Systems    Objective   Vitals:    02/21/24 1007   BP: 124/80   BP Location: Left arm   Patient Position: Sitting   Cuff Size: Adult   Pulse: 67   Temp: 97.5 °F (36.4 °C)   SpO2: 100%   Weight: 79.4 kg (175 lb)   Height: 180.3 cm (71\")      Physical Exam  Vitals reviewed.   Neurological:      Mental Status: He is alert.      Motor: Weakness and atrophy present.      Coordination: Coordination abnormal.      Gait: Gait abnormal.   Psychiatric:         Mood and Affect: Affect is flat.         Speech: Speech is delayed.         Behavior: Behavior is slowed.    "      Cognition and Memory: Cognition is impaired. Memory is impaired. He exhibits impaired recent memory and impaired remote memory.     His speech is slow.  Gait is very unsteady  Healing scratches and bruises on his face  Girlfriend does all the talking.  Jake is not able to contribute much history.  ?  Parkinson's    Assessment & Plan   Diagnoses and all orders for this visit:    1. Gait instability (Primary)  -     Ambulatory Referral to Neurology  -     Ambulatory Referral to Palliative Care  -     Motorized Scooter    2. 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 Cognitive decline  -     Ambulatory Referral to Neurology  -     Ambulatory Referral to Palliative Care  -     Motorized Scooter      There are no Patient Instructions on file for this visit.    There are no discontinued medications.     Return in about 3 months (around 5/21/2024).    Dr. Sree Thomas  [place], [place]

## 2024-02-27 ENCOUNTER — TELEPHONE (OUTPATIENT)
Dept: FAMILY MEDICINE CLINIC | Facility: CLINIC | Age: 79
End: 2024-02-27
Payer: MEDICARE

## 2024-02-27 NOTE — TELEPHONE ENCOUNTER
Patient's caregiver was in w/Noni Kan.  She stated that [facility] is waiting on a prescription for a scooter.

## 2024-02-27 NOTE — TELEPHONE ENCOUNTER
Called caregiver and informed her that I spoke with a representative from [facility] and they informed me that they did have the order but after speaking with them they did not have the correct address or phone number on file. I faxed a demographic page as requested and gave them the caregivers number to call.

## 2024-03-05 ENCOUNTER — APPOINTMENT (OUTPATIENT)
Dept: CT IMAGING | Facility: HOSPITAL | Age: 79
End: 2024-03-05
Payer: MEDICARE

## 2024-03-05 ENCOUNTER — HOSPITAL ENCOUNTER (EMERGENCY)
Facility: HOSPITAL | Age: 79
Discharge: HOME | End: 2024-03-05
Attending: EMERGENCY MEDICINE | Admitting: EMERGENCY MEDICINE
Payer: MEDICARE

## 2024-03-05 VITALS
HEART RATE: 57 BPM | OXYGEN SATURATION: 97 % | SYSTOLIC BLOOD PRESSURE: 162 MMHG | RESPIRATION RATE: 18 BRPM | TEMPERATURE: 96 F | DIASTOLIC BLOOD PRESSURE: 84 MMHG

## 2024-03-05 DIAGNOSIS — S09.90XA INJURY OF HEAD, INITIAL ENCOUNTER: Primary | ICD-10-CM

## 2024-03-05 PROCEDURE — 72125 CT NECK SPINE W/O DYE: CPT

## 2024-03-05 PROCEDURE — 70450 CT HEAD/BRAIN W/O DYE: CPT

## 2024-03-05 PROCEDURE — 99284 EMERGENCY DEPT VISIT MOD MDM: CPT

## 2024-03-05 NOTE — ED PROVIDER NOTES
MD ATTESTATION NOTE    The EDVIN and I have discussed this patient's history, physical exam, and treatment plan.  I have reviewed the documentation and personally had a face to face interaction with the patient. I affirm the documentation and agree with the treatment and plan.  The attached note describes my personal findings.      I provided a substantive portion of the care of the patient.  I personally performed the physical exam in its entirety, and below are my findings.  For this patient encounter, the patient wore surgical mask, I wore full protective PPE including N95 and eye protection.      Brief HPI: Patient presents for fall.  Patient does have dementia.  Patient had ground-level fall.  Patient states he does not hurt anywhere.  No blood thinners.  Patient does have some confusion.  States he did not have syncopal episode.    PHYSICAL EXAM  ED Triage Vitals   Temp Heart Rate Resp BP SpO2   03/05/24 1833 03/05/24 1833 03/05/24 1833 03/05/24 1844 03/05/24 1833   96 °F (35.6 °C) 69 18 166/90 99 %      Temp src Heart Rate Source Patient Position BP Location FiO2 (%)   -- -- -- -- --                GENERAL: no acute distress  HENT: nares patent  EYES: no scleral icterus  CV: regular rhythm, normal rate  RESPIRATORY: normal effort  ABDOMEN: soft  MUSCULOSKELETAL: no deformity  NEURO: alert, moves all extremities, follows commands  PSYCH:  calm, cooperative  SKIN: warm, dry    Vital signs and nursing notes reviewed.    CT head independently interpreted by me and shows no evidence of bleeding  ED Course as of 03/05/24 2019   Tue Mar 05, 2024   2005 CT head and C-spine unremarkable for any acute findings.  Counseled patient and friend/caregiver at the bedside about these results.  He is stable for discharge.    She is considering in-home caregivers for him when she is not there.  I have discussed with FREDY Lance in the ER and she will provide some information for organizations that provide in-home care, return  precautions discussed [KA]      ED Course User Index  [KA] Ольга Wasserman PA-C           Plan: discharge    SHARED VISIT: This visit was performed by BOTH a physician and an APC. The substantive portion of the medical decision making was performed by this attesting physician who made or approved the management plan and takes responsibility for patient management. All studies in the APC note (if performed) were independently interpreted by me.         Ryland Bruce MD  03/05/24 2020

## 2024-03-06 NOTE — ED PROVIDER NOTES
EMERGENCY DEPARTMENT ENCOUNTER  Room Number:  01/01  PCP: Sree Thomas MD  Independent Historians: Patient and Family      HPI:  Chief Complaint: had concerns including Fall.       A complete HPI/ROS/PMH/PSH/SH/FH are unobtainable due to: None    Chronic or social conditions impacting patient care (Social Determinants of Health): None      Context: Jake Kan is a 78 y.o. male with a medical history of dementia who presents to the ED c/o acute fall.  He lives with his friend and caregiver who is at the bedside.  She states she had gone inside the house for less than 2 minutes and when she came back out he had fallen.  He reported to her that he had struck his head.  She and another neighbor helped him off the ground and he was able to walk she brought him to the emergency department for further evaluation.  He is not anticoagulated.  He denies any headache nausea or vomiting neck pain back pain or any other pain or complaints at this time.      Review of prior external notes (non-ED) -and- Review of prior external test results outside of this encounter:  Patient admitted 11/9/2023 to 11/21/2023 for generalized weakness, found to have pneumonia due to COVID-19 and traumatic rhabdomyolysis and acute encephalopathy.  He completed Paxlovid, return to baseline was discharged.        PAST MEDICAL HISTORY  Active Ambulatory Problems     Diagnosis Date Noted    Sciatica of right side 11/29/2017    Gastroesophageal reflux disease without esophagitis 11/29/2017    Abnormal EKG 08/29/2012    Hyperlipidemia 03/16/2011    Rosacea 07/11/2012    Seborrheic dermatitis 03/19/2012    Pneumonia due to COVID-19 virus 09/02/2021    Metabolic encephalopathy 09/03/2021    Stage 3a chronic kidney disease 09/03/2021    Cytokine release syndrome, grade 2 09/15/2021    Age-related cognitive decline 05/24/2023    Acute encephalopathy 11/08/2023    Non-traumatic rhabdomyolysis 11/08/2023    Moderate early onset Alzheimer's dementia  with mood disturbance 11/14/2023    Gait instability 02/21/2024     Resolved Ambulatory Problems     Diagnosis Date Noted    Phlegm in throat 11/29/2017    Debility 08/29/2012    Other symptoms involving digestive system 03/29/2011    Paresthesias 03/19/2012    Pruritus 01/04/2012    Abscess, umbilical 08/07/2019    Dehydration 09/03/2021    Hypernatremia 09/03/2021    ANA (acute kidney injury) 09/03/2021    Positive D-dimer 09/03/2021    Acute cystitis 09/03/2021    Hypoxia 09/03/2021    Traumatic rhabdomyolysis 11/14/2023     No Additional Past Medical History         PAST SURGICAL HISTORY  Past Surgical History:   Procedure Laterality Date    HERNIA REPAIR  1988    UMBILICAL HERNIA REPAIR N/A 9/23/2019    Procedure: excision of umbilical hernia mesh and umbilical hernia repair;  Surgeon: Luan Dowell MD;  Location: [facility] MAIN OR;  Service: General    WRIST FRACTURE SURGERY Left 1998         FAMILY HISTORY  Family History   Problem Relation Age of Onset    COPD Sister     No Known Problems Brother     No Known Problems Daughter          SOCIAL HISTORY  Social History     Socioeconomic History    Marital status: Single    Number of children: 1   Tobacco Use    Smoking status: Never    Smokeless tobacco: Never   Vaping Use    Vaping status: Never Used   Substance and Sexual Activity    Alcohol use: No    Drug use: No    Sexual activity: Not Currently     Partners: Female         ALLERGIES  Patient has no known allergies.      REVIEW OF SYSTEMS  Review of Systems  Included in HPI  All systems reviewed and negative except for those discussed in HPI.      PHYSICAL EXAM    I have reviewed the triage vital signs and nursing notes.    ED Triage Vitals   Temp Heart Rate Resp BP SpO2   03/05/24 1833 03/05/24 1833 03/05/24 1833 03/05/24 1842 03/05/24 1833   96 °F (35.6 °C) 69 18 166/90 99 %      Temp src Heart Rate Source Patient Position BP Location FiO2 (%)   -- -- -- -- --              Physical Exam  GENERAL: alert,  no acute distress  SKIN: Warm, dry  HENT: Normocephalic, atraumatic  EYES: no scleral icterus  CV: regular rhythm, regular rate  RESPIRATORY: normal effort, lungs clear  ABDOMEN: nondistended  MUSCULOSKELETAL: no deformity, no C, T, L-spine tenderness.  Full range of motion of the neck without pain.  Extremities are atraumatic with full range of motion and are nontender  NEURO: alert, moves all extremities, follows commands            LAB RESULTS  No results found for this or any previous visit (from the past 24 hour(s)).      RADIOLOGY  CT Head Without Contrast, CT Cervical Spine Without Contrast    Result Date: 3/5/2024  CT HEAD WO CONTRAST-, CT CERVICAL SPINE WO CONTRAST-  INDICATIONS: Trauma  TECHNIQUE: Radiation dose reduction techniques were utilized, including automated exposure control and exposure modulation based on body size. Noncontrast head CT, cervical spine CT  COMPARISON: 11/8/2023 head CT  FINDINGS:  Head CT:  No acute intracranial hemorrhage, midline shift or mass effect. No acute territorial infarct is identified. Basal ganglia mineralization is present.  Mild periventricular hypodensities suggest chronic small vessel ischemic change in a patient this age.  Arterial calcifications are seen at the base of the brain.  Ventricles, cisterns, cerebral sulci are unremarkable for patient age.  The visualized paranasal sinuses, orbits, mastoid air cells are unremarkable.   Cervical spine CT:  No acute fracture is identified. The left C1 foramen transversarium is developmentally incomplete. Fusion changes are evident at and C3/4. The disc spaces otherwise appear unremarkable.  Facet hypertrophy contributes to neuroforaminal narrowing on the left at C4/5.  Thyroid nodularity is apparent, but suboptimally evaluated with this technique, thyroid ultrasound correlation advised as indicated. Bilateral carotid arterial calcifications are present. Minimal biapical fibronodular changes of the lungs are seen.          No acute intracranial hemorrhage or hydrocephalus; chronic changes of the brain. No acute cervical fracture identified; degenerative changes of the cervical spine. If there is further clinical concern, MRI could be considered for further evaluation.  This report was finalized on 3/5/2024 7:47 PM by Dr. Christo Marley M.D on Workstation: [id number]         MEDICATIONS GIVEN IN ER  Medications - No data to display      ORDERS PLACED DURING THIS VISIT:  Orders Placed This Encounter   Procedures    CT Head Without Contrast    CT Cervical Spine Without Contrast         OUTPATIENT MEDICATION MANAGEMENT:  No current Epic-ordered facility-administered medications on file.     Current Outpatient Medications Ordered in Epic   Medication Sig Dispense Refill    amLODIPine (NORVASC) 5 MG tablet Take 1 tablet by mouth Daily. (Patient not taking: Reported on 2/21/2024) 90 tablet 0    donepezil (Aricept) 10 MG tablet Take 1 tablet by mouth Daily With Dinner. (Patient not taking: Reported on 2/21/2024) 90 tablet 0    lansoprazole (PREVACID) 15 MG capsule Take 1 capsule by mouth Daily. 90 capsule 0    melatonin 3 MG tablet Take 1 tablet by mouth Every Night. (Patient not taking: Reported on 2/21/2024) 30 tablet 0         PROCEDURES  Procedures            PROGRESS, DATA ANALYSIS, CONSULTS, AND MEDICAL DECISION MAKING  All labs have been independently interpreted by me.  All radiology studies have been reviewed by me. All EKG's have been independently viewed and interpreted by me.  Discussion below represents my analysis of pertinent findings related to patient's condition, differential diagnosis, treatment plan and final disposition.    DIFFERENTIAL    Clinical Scores:                  ED Course as of 03/05/24 2010   Tue Mar 05, 2024   2005 CT head and C-spine unremarkable for any acute findings.  Counseled patient and friend/caregiver at the bedside about these results.  He is stable for discharge.    She is considering in-home  caregivers for him when she is not there.  I have discussed with FREDY Lance in the ER and she will provide some information for organizations that provide in-home care, return precautions discussed [KA]      ED Course User Index  [KA] Ольга Wasserman PA-C             AS OF 20:10 EST VITALS:    BP - 162/84  HR - 57  TEMP - 96 °F (35.6 °C)  O2 SATS - 97%    COMPLEXITY OF CARE  Admission was considered but after careful review of the patient's presentation, physical examination, diagnostic results, and response to treatment the patient may be safely discharged with outpatient follow-up.      DIAGNOSIS  Final diagnoses:   Injury of head, initial encounter         DISPOSITION  ED Disposition       ED Disposition   Discharge    Condition   Good    Comment   --                  FOLLOW UP  Sree Thomas MD  [address] [id number] [phone]      As needed    [facility] EMERGENCY DEPARTMENT  [address] [place] [address] [phone]    If symptoms worsen or any concerns              Please note that portions of this document were completed with a voice recognition program.    Note Disclaimer: At [facility], we believe that sharing information builds trust and better relationships. You are receiving this note because you recently visited [facility]. It is possible you will see health information before a provider has talked with you about it. This kind of information can be easy to misunderstand. To help you fully understand what it means for your health, we urge you to discuss this note with your provider.         Ольга Wasserman PA-C  03/05/24 2010

## 2024-03-06 NOTE — CASE MANAGEMENT/SOCIAL WORK
Discharge Planning Assessment  [facility]     Patient Name: Jake Kan  MRN: [id number]  Today's Date: 3/5/2024    Admit Date: 3/5/2024        Discharge Needs Assessment    No documentation.                  Discharge Plan       Row Name 03/05/24 2036       Plan    Plan Comments Spoke with patient and wife at bedside to provide community resouces (in home personal caregivers), per their request. EDD Rose RN                  Continued Care and Services - Discharged on 3/5/2024 Admission date: 3/5/2024 - Discharge disposition: Home or Self Care   No active coordination exists for this encounter.          Demographic Summary    No documentation.                  Functional Status    No documentation.                  Psychosocial    No documentation.                  Abuse/Neglect    No documentation.                  Legal    No documentation.                  Substance Abuse    No documentation.                  Patient Forms    No documentation.                     Sree Estrella, RN

## 2024-03-25 ENCOUNTER — TELEPHONE (OUTPATIENT)
Dept: FAMILY MEDICINE CLINIC | Facility: CLINIC | Age: 79
End: 2024-03-25

## 2024-03-25 NOTE — TELEPHONE ENCOUNTER
"Caller: LORETTA    Relationship:     Best call back number: [phone]    What orders are you requesting (i.e. lab or imaging): POWER SCOOTER     Additional notes: ORDERS/PRESCRIPTION NEEDS TO SAY \"POWER SCOOTER EVALUATION\", PLEASE.   THIS WAY LORETTA CAN SET EVERYTHING UP FOR THE PATIENT WITHOUT HAVING TO CONTACT PROVIDER       FAX: [phone]  "

## 2024-07-02 ENCOUNTER — OFFICE VISIT (OUTPATIENT)
Dept: FAMILY MEDICINE CLINIC | Facility: CLINIC | Age: 79
End: 2024-07-02
Payer: MEDICARE

## 2024-07-02 VITALS
HEART RATE: 60 BPM | BODY MASS INDEX: 23.24 KG/M2 | OXYGEN SATURATION: 99 % | SYSTOLIC BLOOD PRESSURE: 120 MMHG | WEIGHT: 166 LBS | TEMPERATURE: 97.3 F | HEIGHT: 71 IN | DIASTOLIC BLOOD PRESSURE: 76 MMHG

## 2024-07-02 DIAGNOSIS — F02.B3 MODERATE EARLY ONSET ALZHEIMER'S DEMENTIA WITH MOOD DISTURBANCE: Primary | ICD-10-CM

## 2024-07-02 DIAGNOSIS — G30.0 MODERATE EARLY ONSET ALZHEIMER'S DEMENTIA WITH MOOD DISTURBANCE: Primary | ICD-10-CM

## 2024-07-02 PROCEDURE — 1160F RVW MEDS BY RX/DR IN RCRD: CPT | Performed by: FAMILY MEDICINE

## 2024-07-02 PROCEDURE — 99213 OFFICE O/P EST LOW 20 MIN: CPT | Performed by: FAMILY MEDICINE

## 2024-07-02 PROCEDURE — 1159F MED LIST DOCD IN RCRD: CPT | Performed by: FAMILY MEDICINE

## 2024-07-02 PROCEDURE — 1126F AMNT PAIN NOTED NONE PRSNT: CPT | Performed by: FAMILY MEDICINE

## 2024-07-02 NOTE — PROGRESS NOTES
"  Subjective   Jake Kan is a 79 y.o. male who is here for   Chief Complaint   Patient presents with    Follow-up     Cognitive decline   .     History of Present Illness     Mr. Kan brought in again by his longtime girlfriend.  She reports that his dementia is getting worse.  He now lives with her most of the time  He is not able to drive a car  Cannot manage his finances well.  She maintains the household.  He stopped his medications sometime ago.  Does not want to take them    The following portions of the patient's history were reviewed and updated as appropriate: allergies, current medications, past medical history, past social history, past surgical history, and problem list.    Review of Systems    Objective   Vitals:    07/02/24 1328   BP: 120/76   Pulse: 60   Temp: 97.3 °F (36.3 °C)   SpO2: 99%   Weight: 75.3 kg (166 lb)   Height: 180.3 cm (71\")      Physical Exam  Vitals reviewed.   Neurological:      General: No focal deficit present.      Mental Status: He is alert.   Psychiatric:         Cognition and Memory: Cognition is impaired. Memory is impaired. He exhibits impaired recent memory and impaired remote memory.         Assessment & Plan   Diagnoses and all orders for this visit:    1. 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  Will not be able to force him to take his medications  We discussed advancing dementia  Return in 6 months    There are no Patient Instructions on file for this visit.    There are no discontinued medications.     Return in about 6 months (around 1/2/2025).    Dr. Sree Thomas  [facility] [place], [place]    "

## 2024-07-17 ENCOUNTER — TELEPHONE (OUTPATIENT)
Dept: FAMILY MEDICINE CLINIC | Facility: CLINIC | Age: 79
End: 2024-07-17

## 2024-07-17 DIAGNOSIS — R41.81 AGE-RELATED COGNITIVE DECLINE: ICD-10-CM

## 2024-07-17 RX ORDER — DONEPEZIL HYDROCHLORIDE 10 MG/1
10 TABLET, FILM COATED ORAL
Qty: 90 TABLET | Refills: 0 | Status: SHIPPED | OUTPATIENT
Start: 2024-07-17

## 2024-07-17 NOTE — TELEPHONE ENCOUNTER
Mr. Kan's friend and caretaker Kary called this morning she cannot get him into the office.    His dementia is getting worse.  She will do her best to encourage him to take his medications, I will send in a refill of his Aricept.    Will also have senior transitions go out to the home and talk with her about accessing community resources for his care and dementia    Please give Kary a call this afternoon    Also please reach out to [facility] Cynthia Francois, so she can do a in-home discussion with Kary regarding Jake's care

## 2024-07-17 NOTE — TELEPHONE ENCOUNTER
Patients caregiver notified and given information to reach out to Cynthia Francois for assistance.

## 2024-08-16 ENCOUNTER — TELEPHONE (OUTPATIENT)
Dept: FAMILY MEDICINE CLINIC | Facility: CLINIC | Age: 79
End: 2024-08-16

## 2024-08-16 NOTE — TELEPHONE ENCOUNTER
HUB TO RELAY:   Tried to call patient to reschedule his appointment with Dr. Thomas on 04/01/2025.   The provider will be out of the office & the visit needs to be changed to his Medicare Wellness, not an office visit.   Patient did not answer, LVM.

## 2025-06-18 PROBLEM — T79.6XXA TRAUMATIC RHABDOMYOLYSIS: Status: ACTIVE | Noted: 2023-11-08

## 2025-08-14 ENCOUNTER — TELEPHONE (OUTPATIENT)
Dept: FAMILY MEDICINE CLINIC | Facility: CLINIC | Age: 80
End: 2025-08-14
Payer: MEDICARE

## (undated) DEVICE — PK PROC TURNOVER

## (undated) DEVICE — GLV SURG BIOGEL LTX PF 7

## (undated) DEVICE — GOWN,REINFORCE,POLY,SIRUS,BREATH SLV,XLG: Brand: MEDLINE

## (undated) DEVICE — DRSNG WND BORDR/ADHS NONADHR/GZ LF 4X4IN STRL

## (undated) DEVICE — WET SKIN PREP TRAY: Brand: MEDLINE INDUSTRIES, INC.

## (undated) DEVICE — UNDERGLV SURG BIOGEL INDICATOR LTX PF 7

## (undated) DEVICE — ADHS LIQ MASTISOL 2/3ML

## (undated) DEVICE — SPNG GZ 2S 2X2 8PLY STRL PK/2

## (undated) DEVICE — SOL IRRIG H2O 1000ML STRL

## (undated) DEVICE — SKIN AFFIX SURG ADHESIVE 72/CS 0.55ML: Brand: MEDLINE

## (undated) DEVICE — PK MAJ LAPAROTOMY 50

## (undated) DEVICE — DRSNG SURESITE WNDW 4X4.5

## (undated) DEVICE — 3M™ STERI-STRIP™ REINFORCED ADHESIVE SKIN CLOSURES, R1547, 1/2 IN X 4 IN (12 MM X 100 MM), 6 STRIPS/ENVELOPE: Brand: 3M™ STERI-STRIP™

## (undated) DEVICE — SUT VIC 3/0 SH 27IN J416H

## (undated) DEVICE — 3M™ PATIENT PLATE, CORDED, SPLIT, LARGE, 40 PER CASE, 1179: Brand: 3M™

## (undated) DEVICE — 3M™ IOBAN™ 2 ANTIMICROBIAL INCISE DRAPE 6650EZ: Brand: IOBAN™ 2

## (undated) DEVICE — UNDYED BRAIDED (POLYGLACTIN 910), SYNTHETIC ABSORBABLE SUTURE: Brand: COATED VICRYL

## (undated) DEVICE — SUT ETHIB 0 CT1 CR8 18IN CX21D